# Patient Record
Sex: FEMALE
[De-identification: names, ages, dates, MRNs, and addresses within clinical notes are randomized per-mention and may not be internally consistent; named-entity substitution may affect disease eponyms.]

---

## 2017-03-02 RX ORDER — CHLORHEXIDINE GLUCONATE 213 G/1000ML
1 SOLUTION TOPICAL ONCE
Qty: 0 | Refills: 0 | Status: DISCONTINUED | OUTPATIENT
Start: 2017-03-06 | End: 2017-03-21

## 2017-03-03 ENCOUNTER — APPOINTMENT (OUTPATIENT)
Dept: HEART AND VASCULAR | Facility: CLINIC | Age: 32
End: 2017-03-03

## 2017-03-03 VITALS — SYSTOLIC BLOOD PRESSURE: 141 MMHG | OXYGEN SATURATION: 99 % | DIASTOLIC BLOOD PRESSURE: 90 MMHG | HEART RATE: 93 BPM

## 2017-03-06 ENCOUNTER — OUTPATIENT (OUTPATIENT)
Dept: OUTPATIENT SERVICES | Facility: HOSPITAL | Age: 32
LOS: 1 days | End: 2017-03-06
Payer: MEDICAID

## 2017-03-06 DIAGNOSIS — T82.897A OTHER SPECIFIED COMPLICATION OF CARDIAC PROSTHETIC DEVICES, IMPLANTS AND GRAFTS, INITIAL ENCOUNTER: Chronic | ICD-10-CM

## 2017-03-06 LAB — HCG SERPL-ACNC: <1 MIU/ML — SIGNIFICANT CHANGE UP

## 2017-03-06 PROCEDURE — C1769: CPT

## 2017-03-06 PROCEDURE — 36224 PLACE CATH CAROTD ART: CPT | Mod: 50

## 2017-03-06 PROCEDURE — 36223 PLACE CATH CAROTID/INOM ART: CPT | Mod: 59,LT

## 2017-03-06 PROCEDURE — C1894: CPT

## 2017-03-06 PROCEDURE — C1887: CPT

## 2017-03-06 PROCEDURE — 36226 PLACE CATH VERTEBRAL ART: CPT | Mod: LT

## 2017-03-06 PROCEDURE — 36225 PLACE CATH SUBCLAVIAN ART: CPT

## 2017-03-06 PROCEDURE — 84702 CHORIONIC GONADOTROPIN TEST: CPT

## 2017-03-06 PROCEDURE — C1760: CPT

## 2017-03-06 PROCEDURE — 36228 PLACE CATH INTRACRANIAL ART: CPT

## 2017-03-06 PROCEDURE — 36227 PLACE CATH XTRNL CAROTID: CPT | Mod: RT

## 2017-03-06 PROCEDURE — 36224 PLACE CATH CAROTD ART: CPT | Mod: RT

## 2017-03-06 RX ORDER — ACETAMINOPHEN 500 MG
650 TABLET ORAL ONCE
Qty: 0 | Refills: 0 | Status: DISCONTINUED | OUTPATIENT
Start: 2017-03-06 | End: 2017-03-21

## 2017-03-06 RX ORDER — SODIUM CHLORIDE 9 MG/ML
1000 INJECTION INTRAMUSCULAR; INTRAVENOUS; SUBCUTANEOUS
Qty: 0 | Refills: 0 | Status: DISCONTINUED | OUTPATIENT
Start: 2017-03-06 | End: 2017-03-21

## 2017-03-06 NOTE — BRIEF OPERATIVE NOTE - OPERATION/FINDINGS
Cerebral angiogram was performed under MAC sedation, using a 5 fr sheath right CFA.  Findings c/w Moyamoya disease. Collateral flow from B/L PCA to MCA and DAVID . Left ICA occluded at origin with some distal reconstitution via anterior ethmoidal, artery of foramen rotundum and MMA.  Dural to Pial collateral via left occipital (trans- bony) and left MMA.  Severe stenosis of Right ICA at supraclinoid level enlarged artery of [place] supplying DAVID and PCA territory.  Full report to gayathri  Patient tolerated procedure well, no new neurological deficit, VSS  Right groin/vasc access site: Perclose.  Hemostasis obtained, no hematoma.  Patient was transferred to recovery room and will go home later today.  Above d/w Dr. Morris

## 2017-03-08 ENCOUNTER — OUTPATIENT (OUTPATIENT)
Dept: OUTPATIENT SERVICES | Facility: HOSPITAL | Age: 32
LOS: 1 days | End: 2017-03-08
Payer: MEDICAID

## 2017-03-08 DIAGNOSIS — T82.897A OTHER SPECIFIED COMPLICATION OF CARDIAC PROSTHETIC DEVICES, IMPLANTS AND GRAFTS, INITIAL ENCOUNTER: Chronic | ICD-10-CM

## 2017-03-08 PROCEDURE — 78607: CPT | Mod: 26

## 2017-03-08 RX ORDER — ACETAZOLAMIDE 250 MG/1
1000 TABLET ORAL ONCE
Qty: 0 | Refills: 0 | Status: DISCONTINUED | OUTPATIENT
Start: 2017-03-08 | End: 2017-03-23

## 2017-03-09 PROCEDURE — 78803 RP LOCLZJ TUM SPECT 1 AREA: CPT

## 2017-03-09 PROCEDURE — A9557: CPT

## 2017-03-09 PROCEDURE — 78607: CPT | Mod: 26

## 2017-03-13 DIAGNOSIS — I67.5 MOYAMOYA DISEASE: ICD-10-CM

## 2017-03-15 ENCOUNTER — CHART COPY (OUTPATIENT)
Age: 32
End: 2017-03-15

## 2017-03-22 ENCOUNTER — OUTPATIENT (OUTPATIENT)
Dept: OUTPATIENT SERVICES | Facility: HOSPITAL | Age: 32
LOS: 1 days | End: 2017-03-22
Payer: MEDICAID

## 2017-03-22 DIAGNOSIS — T82.897A OTHER SPECIFIED COMPLICATION OF CARDIAC PROSTHETIC DEVICES, IMPLANTS AND GRAFTS, INITIAL ENCOUNTER: Chronic | ICD-10-CM

## 2017-03-22 DIAGNOSIS — I67.5 MOYAMOYA DISEASE: ICD-10-CM

## 2017-03-22 PROCEDURE — A9557: CPT

## 2017-03-22 PROCEDURE — 70544 MR ANGIOGRAPHY HEAD W/O DYE: CPT | Mod: 26

## 2017-03-22 PROCEDURE — 70544 MR ANGIOGRAPHY HEAD W/O DYE: CPT

## 2017-03-23 ENCOUNTER — APPOINTMENT (OUTPATIENT)
Dept: HEMATOLOGY ONCOLOGY | Facility: CLINIC | Age: 32
End: 2017-03-23

## 2017-03-23 VITALS
TEMPERATURE: 98.3 F | WEIGHT: 149 LBS | HEART RATE: 91 BPM | BODY MASS INDEX: 22.58 KG/M2 | OXYGEN SATURATION: 98 % | HEIGHT: 68 IN | RESPIRATION RATE: 14 BRPM | DIASTOLIC BLOOD PRESSURE: 92 MMHG | SYSTOLIC BLOOD PRESSURE: 130 MMHG

## 2017-03-24 ENCOUNTER — RX RENEWAL (OUTPATIENT)
Age: 32
End: 2017-03-24

## 2017-03-24 LAB
25(OH)D3 SERPL-MCNC: 27.4 NG/ML
AT III PPP CHRO-ACNC: 118 %
BASOPHILS # BLD AUTO: 0.03 K/UL
BASOPHILS NFR BLD AUTO: 0.6 %
EOSINOPHIL # BLD AUTO: 0.01 K/UL
EOSINOPHIL NFR BLD AUTO: 0.2 %
ERYTHROCYTE [SEDIMENTATION RATE] IN BLOOD BY WESTERGREN METHOD: 12 MM/HR
FACT VIII ACT/NOR PPP: 121 %
FERRITIN SERPL-MCNC: 13.1 NG/ML
HAPTOGLOB SERPL-MCNC: 158 MG/DL
HCT VFR BLD CALC: 34.2 %
HCYS SERPL-MCNC: 6 UMOL/L
HGB BLD-MCNC: 10.7 G/DL
IMM GRANULOCYTES NFR BLD AUTO: 0.2 %
IRON SATN MFR SERPL: 7 %
IRON SERPL-MCNC: 26 UG/DL
LDH SERPL-CCNC: 215 U/L
LYMPHOCYTES # BLD AUTO: 1.31 K/UL
LYMPHOCYTES NFR BLD AUTO: 28.3 %
MAN DIFF?: NORMAL
MCHC RBC-ENTMCNC: 25 PG
MCHC RBC-ENTMCNC: 31.3 GM/DL
MCV RBC AUTO: 79.9 FL
MONOCYTES # BLD AUTO: 0.38 K/UL
MONOCYTES NFR BLD AUTO: 8.2 %
NEUTROPHILS # BLD AUTO: 2.89 K/UL
NEUTROPHILS NFR BLD AUTO: 62.5 %
PLATELET # BLD AUTO: 304 K/UL
RBC # BLD: 4.28 M/UL
RBC # FLD: 16.7 %
TIBC SERPL-MCNC: 365 UG/DL
UIBC SERPL-MCNC: 339 UG/DL
VIT B12 SERPL-MCNC: 447 PG/ML
WBC # FLD AUTO: 4.63 K/UL

## 2017-03-27 LAB
ANA SER IF-ACNC: NEGATIVE
DNA PLOIDY SPEC FC-IMP: NORMAL

## 2017-03-30 LAB — PTR INTERP: NORMAL

## 2017-05-11 ENCOUNTER — APPOINTMENT (OUTPATIENT)
Dept: NEUROSURGERY | Facility: CLINIC | Age: 32
End: 2017-05-11

## 2017-05-11 VITALS
HEART RATE: 89 BPM | WEIGHT: 149 LBS | DIASTOLIC BLOOD PRESSURE: 95 MMHG | OXYGEN SATURATION: 98 % | BODY MASS INDEX: 22.58 KG/M2 | HEIGHT: 68 IN | SYSTOLIC BLOOD PRESSURE: 144 MMHG

## 2017-05-18 ENCOUNTER — FORM ENCOUNTER (OUTPATIENT)
Age: 32
End: 2017-05-18

## 2017-05-19 ENCOUNTER — OUTPATIENT (OUTPATIENT)
Dept: OUTPATIENT SERVICES | Facility: HOSPITAL | Age: 32
LOS: 1 days | End: 2017-05-19
Payer: MEDICAID

## 2017-05-19 ENCOUNTER — APPOINTMENT (OUTPATIENT)
Dept: HEART AND VASCULAR | Facility: CLINIC | Age: 32
End: 2017-05-19

## 2017-05-19 VITALS — OXYGEN SATURATION: 97 % | HEART RATE: 90 BPM | DIASTOLIC BLOOD PRESSURE: 107 MMHG | SYSTOLIC BLOOD PRESSURE: 141 MMHG

## 2017-05-19 DIAGNOSIS — T82.897A OTHER SPECIFIED COMPLICATION OF CARDIAC PROSTHETIC DEVICES, IMPLANTS AND GRAFTS, INITIAL ENCOUNTER: Chronic | ICD-10-CM

## 2017-05-19 DIAGNOSIS — I25.10 ATHEROSCLEROTIC HEART DISEASE OF NATIVE CORONARY ARTERY WITHOUT ANGINA PECTORIS: ICD-10-CM

## 2017-05-19 PROCEDURE — 93306 TTE W/DOPPLER COMPLETE: CPT

## 2017-05-19 PROCEDURE — 93306 TTE W/DOPPLER COMPLETE: CPT | Mod: 26

## 2017-05-30 VITALS
DIASTOLIC BLOOD PRESSURE: 72 MMHG | TEMPERATURE: 99 F | WEIGHT: 149.91 LBS | RESPIRATION RATE: 16 BRPM | HEART RATE: 82 BPM | SYSTOLIC BLOOD PRESSURE: 145 MMHG | HEIGHT: 68 IN | OXYGEN SATURATION: 100 %

## 2017-05-30 NOTE — PATIENT PROFILE ADULT. - PMH
CAD (coronary artery disease)  Hx Cardiac stent which was re occluded then open  Epilepsy  Epilepsy  Essential hypertension  Hypertension CAD (coronary artery disease)  Hx Cardiac stent which was re occluded then open  Dyslipidemia    Epilepsy  Epilepsy  Essential hypertension  Hypertension  Moyamoya disease CAD (coronary artery disease)  Hx Cardiac stent which was re occluded then open  Dyslipidemia    Epilepsy  Epilepsy  Essential hypertension  Hypertension  Moyamoya disease    TIA (transient ischemic attack)  x 4

## 2017-05-31 ENCOUNTER — INPATIENT (INPATIENT)
Facility: HOSPITAL | Age: 32
LOS: 7 days | Discharge: HOME | DRG: 25 | End: 2017-06-08
Attending: NEUROLOGICAL SURGERY | Admitting: NEUROLOGICAL SURGERY
Payer: MEDICAID

## 2017-05-31 DIAGNOSIS — T82.897A OTHER SPECIFIED COMPLICATION OF CARDIAC PROSTHETIC DEVICES, IMPLANTS AND GRAFTS, INITIAL ENCOUNTER: Chronic | ICD-10-CM

## 2017-05-31 LAB
ALBUMIN SERPL ELPH-MCNC: 3.7 G/DL — SIGNIFICANT CHANGE UP (ref 3.3–5)
ALP SERPL-CCNC: 56 U/L — SIGNIFICANT CHANGE UP (ref 40–120)
ALT FLD-CCNC: 11 U/L — SIGNIFICANT CHANGE UP (ref 10–45)
ANION GAP SERPL CALC-SCNC: 10 MMOL/L — SIGNIFICANT CHANGE UP (ref 5–17)
APTT BLD: 53.6 SEC — HIGH (ref 27.5–37.4)
APTT BLD: >200 SEC — CRITICAL HIGH (ref 27.5–37.4)
APTT BLD: >200 SEC — CRITICAL HIGH (ref 27.5–37.4)
AST SERPL-CCNC: 20 U/L — SIGNIFICANT CHANGE UP (ref 10–40)
BILIRUB SERPL-MCNC: <0.2 MG/DL — SIGNIFICANT CHANGE UP (ref 0.2–1.2)
BUN SERPL-MCNC: 7 MG/DL — SIGNIFICANT CHANGE UP (ref 7–23)
CALCIUM SERPL-MCNC: 8.5 MG/DL — SIGNIFICANT CHANGE UP (ref 8.4–10.5)
CHLORIDE SERPL-SCNC: 108 MMOL/L — SIGNIFICANT CHANGE UP (ref 96–108)
CO2 SERPL-SCNC: 23 MMOL/L — SIGNIFICANT CHANGE UP (ref 22–31)
CREAT SERPL-MCNC: 0.8 MG/DL — SIGNIFICANT CHANGE UP (ref 0.5–1.3)
GLUCOSE SERPL-MCNC: 143 MG/DL — HIGH (ref 70–99)
HCT VFR BLD CALC: 34.1 % — LOW (ref 34.5–45)
HGB BLD-MCNC: 11.1 G/DL — LOW (ref 11.5–15.5)
INR BLD: 1.12 — SIGNIFICANT CHANGE UP (ref 0.88–1.16)
INR BLD: 1.13 — SIGNIFICANT CHANGE UP (ref 0.88–1.16)
MAGNESIUM SERPL-MCNC: 1.8 MG/DL — SIGNIFICANT CHANGE UP (ref 1.6–2.6)
MCHC RBC-ENTMCNC: 27.6 PG — SIGNIFICANT CHANGE UP (ref 27–34)
MCHC RBC-ENTMCNC: 32.6 G/DL — SIGNIFICANT CHANGE UP (ref 32–36)
MCV RBC AUTO: 84.8 FL — SIGNIFICANT CHANGE UP (ref 80–100)
PHOSPHATE SERPL-MCNC: 2.6 MG/DL — SIGNIFICANT CHANGE UP (ref 2.5–4.5)
PLATELET # BLD AUTO: 185 K/UL — SIGNIFICANT CHANGE UP (ref 150–400)
PLATELET RESPONSE ASPIRIN RESULT: 590 ARU — SIGNIFICANT CHANGE UP (ref 350–700)
POTASSIUM SERPL-MCNC: 3.6 MMOL/L — SIGNIFICANT CHANGE UP (ref 3.5–5.3)
POTASSIUM SERPL-SCNC: 3.6 MMOL/L — SIGNIFICANT CHANGE UP (ref 3.5–5.3)
PROT SERPL-MCNC: 6.5 G/DL — SIGNIFICANT CHANGE UP (ref 6–8.3)
PROTHROM AB SERPL-ACNC: 12.5 SEC — SIGNIFICANT CHANGE UP (ref 9.8–12.7)
PROTHROM AB SERPL-ACNC: 12.6 SEC — SIGNIFICANT CHANGE UP (ref 9.8–12.7)
RBC # BLD: 4.02 M/UL — SIGNIFICANT CHANGE UP (ref 3.8–5.2)
RBC # FLD: 16.5 % — SIGNIFICANT CHANGE UP (ref 10.3–16.9)
SODIUM SERPL-SCNC: 141 MMOL/L — SIGNIFICANT CHANGE UP (ref 135–145)
WBC # BLD: 8.4 K/UL — SIGNIFICANT CHANGE UP (ref 3.8–10.5)
WBC # FLD AUTO: 8.4 K/UL — SIGNIFICANT CHANGE UP (ref 3.8–10.5)

## 2017-05-31 PROCEDURE — 71010: CPT | Mod: 26

## 2017-05-31 PROCEDURE — 70450 CT HEAD/BRAIN W/O DYE: CPT | Mod: 26

## 2017-05-31 PROCEDURE — 69990 MICROSURGERY ADD-ON: CPT

## 2017-05-31 PROCEDURE — 64999 UNLISTED PX NERVOUS SYSTEM: CPT

## 2017-05-31 PROCEDURE — 93010 ELECTROCARDIOGRAM REPORT: CPT

## 2017-05-31 PROCEDURE — 92240 ICG ANGIOGRAPHY I&R UNI/BI: CPT | Mod: 26

## 2017-05-31 PROCEDURE — 61711 FUSION OF SKULL ARTERIES: CPT

## 2017-05-31 PROCEDURE — 99291 CRITICAL CARE FIRST HOUR: CPT | Mod: 24

## 2017-05-31 PROCEDURE — 93888 INTRACRANIAL LIMITED STUDY: CPT | Mod: 26

## 2017-05-31 RX ORDER — HEPARIN SODIUM 5000 [USP'U]/ML
600 INJECTION INTRAVENOUS; SUBCUTANEOUS
Qty: 25000 | Refills: 0 | Status: DISCONTINUED | OUTPATIENT
Start: 2017-05-31 | End: 2017-06-01

## 2017-05-31 RX ORDER — ATORVASTATIN CALCIUM 80 MG/1
80 TABLET, FILM COATED ORAL AT BEDTIME
Qty: 0 | Refills: 0 | Status: DISCONTINUED | OUTPATIENT
Start: 2017-05-31 | End: 2017-06-08

## 2017-05-31 RX ORDER — SODIUM CHLORIDE 9 MG/ML
500 INJECTION INTRAMUSCULAR; INTRAVENOUS; SUBCUTANEOUS ONCE
Qty: 0 | Refills: 0 | Status: COMPLETED | OUTPATIENT
Start: 2017-05-31 | End: 2017-06-01

## 2017-05-31 RX ORDER — RANOLAZINE 500 MG/1
1000 TABLET, FILM COATED, EXTENDED RELEASE ORAL
Qty: 0 | Refills: 0 | Status: DISCONTINUED | OUTPATIENT
Start: 2017-05-31 | End: 2017-06-08

## 2017-05-31 RX ORDER — POTASSIUM PHOSPHATE, MONOBASIC POTASSIUM PHOSPHATE, DIBASIC 236; 224 MG/ML; MG/ML
15 INJECTION, SOLUTION INTRAVENOUS ONCE
Qty: 0 | Refills: 0 | Status: COMPLETED | OUTPATIENT
Start: 2017-05-31 | End: 2017-05-31

## 2017-05-31 RX ORDER — MINOCYCLINE HYDROCHLORIDE 45 MG/1
100 TABLET, EXTENDED RELEASE ORAL
Qty: 0 | Refills: 0 | Status: DISCONTINUED | OUTPATIENT
Start: 2017-05-31 | End: 2017-06-05

## 2017-05-31 RX ORDER — RANOLAZINE 500 MG/1
1 TABLET, FILM COATED, EXTENDED RELEASE ORAL
Qty: 0 | Refills: 0 | COMMUNITY

## 2017-05-31 RX ORDER — ACETAMINOPHEN 500 MG
650 TABLET ORAL EVERY 6 HOURS
Qty: 0 | Refills: 0 | Status: DISCONTINUED | OUTPATIENT
Start: 2017-05-31 | End: 2017-06-08

## 2017-05-31 RX ORDER — ASPIRIN/CALCIUM CARB/MAGNESIUM 324 MG
325 TABLET ORAL DAILY
Qty: 0 | Refills: 0 | Status: DISCONTINUED | OUTPATIENT
Start: 2017-05-31 | End: 2017-06-01

## 2017-05-31 RX ORDER — SODIUM CHLORIDE 9 MG/ML
1000 INJECTION INTRAMUSCULAR; INTRAVENOUS; SUBCUTANEOUS
Qty: 0 | Refills: 0 | Status: DISCONTINUED | OUTPATIENT
Start: 2017-05-31 | End: 2017-06-02

## 2017-05-31 RX ORDER — HEPARIN SODIUM 5000 [USP'U]/ML
INJECTION INTRAVENOUS; SUBCUTANEOUS
Qty: 25000 | Refills: 0 | Status: DISCONTINUED | OUTPATIENT
Start: 2017-05-31 | End: 2017-05-31

## 2017-05-31 RX ORDER — SENNA PLUS 8.6 MG/1
2 TABLET ORAL AT BEDTIME
Qty: 0 | Refills: 0 | Status: DISCONTINUED | OUTPATIENT
Start: 2017-05-31 | End: 2017-06-08

## 2017-05-31 RX ORDER — HEPARIN SODIUM 5000 [USP'U]/ML
600 INJECTION INTRAVENOUS; SUBCUTANEOUS
Qty: 25000 | Refills: 0 | Status: DISCONTINUED | OUTPATIENT
Start: 2017-05-31 | End: 2017-05-31

## 2017-05-31 RX ORDER — ACETAMINOPHEN 500 MG
325 TABLET ORAL EVERY 4 HOURS
Qty: 0 | Refills: 0 | Status: DISCONTINUED | OUTPATIENT
Start: 2017-05-31 | End: 2017-06-08

## 2017-05-31 RX ORDER — ONDANSETRON 8 MG/1
4 TABLET, FILM COATED ORAL EVERY 6 HOURS
Qty: 0 | Refills: 0 | Status: DISCONTINUED | OUTPATIENT
Start: 2017-05-31 | End: 2017-06-08

## 2017-05-31 RX ORDER — OXCARBAZEPINE 300 MG/1
600 TABLET, FILM COATED ORAL
Qty: 0 | Refills: 0 | Status: DISCONTINUED | OUTPATIENT
Start: 2017-05-31 | End: 2017-06-08

## 2017-05-31 RX ORDER — INSULIN LISPRO 100/ML
VIAL (ML) SUBCUTANEOUS
Qty: 0 | Refills: 0 | Status: DISCONTINUED | OUTPATIENT
Start: 2017-05-31 | End: 2017-06-08

## 2017-05-31 RX ORDER — DOCUSATE SODIUM 100 MG
100 CAPSULE ORAL THREE TIMES A DAY
Qty: 0 | Refills: 0 | Status: DISCONTINUED | OUTPATIENT
Start: 2017-05-31 | End: 2017-06-08

## 2017-05-31 RX ORDER — MAGNESIUM SULFATE 500 MG/ML
1 VIAL (ML) INJECTION ONCE
Qty: 0 | Refills: 0 | Status: COMPLETED | OUTPATIENT
Start: 2017-05-31 | End: 2017-05-31

## 2017-05-31 RX ORDER — CEFAZOLIN SODIUM 1 G
1000 VIAL (EA) INJECTION EVERY 8 HOURS
Qty: 0 | Refills: 0 | Status: COMPLETED | OUTPATIENT
Start: 2017-05-31 | End: 2017-06-01

## 2017-05-31 RX ORDER — LEVETIRACETAM 250 MG/1
500 TABLET, FILM COATED ORAL EVERY 12 HOURS
Qty: 0 | Refills: 0 | Status: DISCONTINUED | OUTPATIENT
Start: 2017-05-31 | End: 2017-05-31

## 2017-05-31 RX ORDER — ASPIRIN/CALCIUM CARB/MAGNESIUM 324 MG
300 TABLET ORAL ONCE
Qty: 0 | Refills: 0 | Status: COMPLETED | OUTPATIENT
Start: 2017-05-31 | End: 2017-05-31

## 2017-05-31 RX ADMIN — OXCARBAZEPINE 600 MILLIGRAM(S): 300 TABLET, FILM COATED ORAL at 18:46

## 2017-05-31 RX ADMIN — Medication 650 MILLIGRAM(S): at 21:37

## 2017-05-31 RX ADMIN — Medication 100 MILLIGRAM(S): at 22:08

## 2017-05-31 RX ADMIN — MINOCYCLINE HYDROCHLORIDE 100 MILLIGRAM(S): 45 TABLET, EXTENDED RELEASE ORAL at 18:46

## 2017-05-31 RX ADMIN — POTASSIUM PHOSPHATE, MONOBASIC POTASSIUM PHOSPHATE, DIBASIC 62.5 MILLIMOLE(S): 236; 224 INJECTION, SOLUTION INTRAVENOUS at 22:19

## 2017-05-31 RX ADMIN — Medication 100 GRAM(S): at 18:45

## 2017-05-31 RX ADMIN — Medication 100 MILLIGRAM(S): at 22:07

## 2017-05-31 RX ADMIN — Medication 300 MILLIGRAM(S): at 16:22

## 2017-05-31 RX ADMIN — ATORVASTATIN CALCIUM 80 MILLIGRAM(S): 80 TABLET, FILM COATED ORAL at 22:08

## 2017-05-31 RX ADMIN — Medication 650 MILLIGRAM(S): at 22:37

## 2017-05-31 RX ADMIN — RANOLAZINE 1000 MILLIGRAM(S): 500 TABLET, FILM COATED, EXTENDED RELEASE ORAL at 18:45

## 2017-05-31 NOTE — PROGRESS NOTE ADULT - SUBJECTIVE AND OBJECTIVE BOX
=================================  NEUROCRITICAL CARE ATTENDING NOTE  =================================    STEPHANE NICOLE   MRN-[id number]  Summary:  31F with Hypertension, dyslipidemia, CAD, s/p stents x3, presented with intermittent speech difficulties, HA, seizures, diagnosed with TIAs.  Imaging revealed bilatearl ICA occlusion, Moyamoya pattern.  SPECT / NOVA showed asymmetric flow favoring R, with worsening perfusion on acetazolamide.  Admitted  for elective L direct and indirect bypass; bypass clotted in the OR - given extra doses of ASA, started on heparin drip  Overnight Events: admitted to NSICU this afternoon    PAST MEDICAL & SURGICAL HISTORY: TIA (transient ischemic attack): x 4 Moyamoya disease Dyslipidemia CAD (coronary artery disease): Hx Cardiac stent which was re occluded then open Essential hypertension: Hypertension Epilepsy: Epilepsy Coronary stent occlusion Other postprocedural status: S/P   NKDA  Home meds: NTG PRN, ISMN 30mg daily ticagrelor 90mg BID atorvastatin 80mg HS  daily ranexa 1g ER BID cardizem CD daily oxcarbazepine 600mg BID    PHYSICAL EXAMINATION  VS reviewed  NEUROLOGIC EXAMINATION:  Patient is lethargic but easily rousable, fully oriented, follows commands, pupils 3mm equal and briskly reactive to light, EOMs intact, moves all 4s  GENERAL:  not intubated, not in cardiorespiratory distress  EENT: anicteric  CARDIOVASC:  (+) S1 S2, normal rate and regular rhythm  PULMONARY:  clear to auscultation bilaterally  ABDOMEN:  soft, nontender, with normoactive bowel sounds  EXTREMITIES:  no edema  SKIN:  no rash    LABS:             11.1   8.4   )-----------( 185      ( 31 May 2017 16:18 )             34.1     Bacteriology:  CSF studies:  EEG:  Neuroimaging: no recent imaging  Other imaging:    MEDICATIONS: asa 325 daily ancef q8h docusate mod ISS minocycline 100 BID atorvastatin 80mg HS oxcarbazepine 600mg BID ranolazine 1g BID bisacodyl senna    IV FLUIDS: NS  DRIPS: heprarin gtt  DIET: NPO  Lines / Drains: Lexie Rosa SG JP    CODE STATUS:  full code                       GOALS OF CARE:  aggressive                      DISPOSITION:  ICU

## 2017-05-31 NOTE — H&P PST ADULT - HISTORY OF PRESENT ILLNESS
32 yo woman with extensive medical history including HTN, dyslipidemia, CAD vs coronary vasospasm S/P PTCA x 3, who recently presented with L hemispheric TIAs (intermittent speech difficulties) as well as H/A and seizures. Vascular imaging (MRA, DSA) revealed bilateral ICA occlusion with multiple sources of collateral flow to bilateral hemispheres (mostly PCA on R side, PCA and transdural MMA collaterals on L side), consistent with severe bilateral moyamoya. Perfusion imaging (SPECT, NOVA) showed asymmetric flow favoring R side, which is worse after acetazolamide challenge. Bilateral cerebral revascularization was thus indicated, starting with the more symptomatic L side. Patient is being admitted electively for L direct-indirect EC-IC bypass.

## 2017-05-31 NOTE — PROGRESS NOTE ADULT - ATTENDING COMMENTS
PLAN:   NEURO: neurochecks q1h, PRN pain meds with Tylenol, no opiates / benzos while lethargic  s/p bypass:  continue ASA daily, heparin gtt; CT this evening; minocycline x at least 4 days  epilepsy: continue oxcarbazepine 600mg BID, d/c levetiracetam   SG TASHI - keep for now, monitor output  REHAB:  no physical therapy for now    EARLY MOB:  HOB 20 degrees; bedrest for now until cleared by neurosurgery    PULM:  Room air, incentive spirometry  CARDIO:  SBP goal 140-180 mm Hg; continue ranexa, hold ISMN and cardizem for now; may need BP augmentation if SBP drops below 140; continue high dose statin; ticagrelor on hold for now; consult cardio  ENDO:  Blood sugar goals 140-180 mg/dL, continue insulin sliding scale  GI:  docusate senna  DIET: bedside swallow once more awake  RENAL:  NS@100cc/hr  HEM/ONC: Hb stable; PTT goal 40-53 - heparin gtt per neuro protocol  VTE Prophylaxis: SCDs, on heparin drip  ID: afebrile, no leukocytosis  Social: family at bedside, updated    Patient at high risk for neurological deterioration or death due to:  stroke, intracranial bleed, delirium, hyperperfusion injury, seizures.  Critical care time, excluding procedures: 60 minutes.

## 2017-05-31 NOTE — H&P PST ADULT - ASSESSMENT
32 yo woman with severe symptomatic bilateral moyamoya, worse on L. Plan for combined direct-indirect EC-IC bypass today in OR.

## 2017-05-31 NOTE — PROGRESS NOTE ADULT - ASSESSMENT
31F with Sandra sandra disease s/p combined direct /indirect L EC-IC bypass (05/31/17, Dr. Long); dyslipidemia, CAD, Hypertension, h/o epilepsy, s/p coronary stents

## 2017-05-31 NOTE — PROGRESS NOTE ADULT - SUBJECTIVE AND OBJECTIVE BOX
POC    Pt seen at bedside. Denies HA.     Vital Signs Last 24 Hrs  T(C): --  T(F): --  HR: --  BP: --  BP(mean): --  RR: --  SpO2: --    I&O's Detail    I&O's Summary      PHYSICAL EXAM:  Neurological:  Lethargic but arousable  AxOx3 in [place]  PERRL  speech coherent  FC  No pronator drift  PRATER w/o focal deficit    Incision/Wound:C/D/I    TUBES/LINES:  [] CVC  [x] A-line  [] Lumbar Drain  [] Ventriculostomy  [x] Other-TASHI subgaleal    DIET:  [x] NPO  [] Mechanical  [] Tube feeds    LABS:                        11.1   8.4   )-----------( 185      ( 31 May 2017 16:18 )             34.1           PT/INR - ( 31 May 2017 15:12 )   PT: 12.6 sec;   INR: 1.13          PTT - ( 31 May 2017 15:12 )  PTT:>200.0 sec        CAPILLARY BLOOD GLUCOSE      Drug Levels: [] N/A    CSF Analysis: [] N/A      Allergies    No Known Allergies    Intolerances      MEDICATIONS:  Antibiotics:  ceFAZolin   IVPB 1000milliGRAM(s) IV Intermittent every 8 hours  minocycline 100milliGRAM(s) Oral two times a day    Neuro:  aspirin 325milliGRAM(s) Oral daily  acetaminophen   Tablet. 325milliGRAM(s) Oral every 4 hours PRN  acetaminophen   Tablet. 650milliGRAM(s) Oral every 6 hours PRN  ondansetron Injectable 4milliGRAM(s) IV Push every 6 hours PRN  OXcarbazepine 600milliGRAM(s) Oral two times a day    Anticoagulation:  heparin  Infusion. Unit(s)/Hr IV Continuous <Continuous>    OTHER:  docusate sodium 100milliGRAM(s) Oral three times a day  bisacodyl 5milliGRAM(s) Oral daily PRN  senna 2Tablet(s) Oral at bedtime PRN  insulin lispro (HumaLOG) corrective regimen sliding scale  SubCutaneous Before meals and at bedtime  atorvastatin 80milliGRAM(s) Oral at bedtime  ranolazine 1000milliGRAM(s) Oral two times a day    IVF:  sodium chloride 0.9%. 1000milliLiter(s) IV Continuous <Continuous>    CULTURES:    RADIOLOGY & ADDITIONAL TESTS:      ASSESSMENT:  31y Female s/p L STA-MCA direct and indirect bypass. POD#0    PLAN:  -bedrest x 24hr  -heparin drip and aspirin  -CTH tonight  -Strict BP parameters 140-180  -minocycline  -f/u TASHI output  -neurochecks q1hr  -D/W     DVT PROPHYLAXIS:  [] Venodynes                                [] Heparin/Lovenox    FALL RISK:  [] Low Risk                                    [] Impulsive

## 2017-05-31 NOTE — H&P PST ADULT - PMH
CAD (coronary artery disease)  Hx Cardiac stent which was re occluded then open  Dyslipidemia    Epilepsy  Epilepsy  Essential hypertension  Hypertension  Moyamoya disease    TIA (transient ischemic attack)  x 4

## 2017-06-01 LAB
ANION GAP SERPL CALC-SCNC: 10 MMOL/L — SIGNIFICANT CHANGE UP (ref 5–17)
APTT BLD: 35.2 SEC — SIGNIFICANT CHANGE UP (ref 27.5–37.4)
APTT BLD: 40.1 SEC — HIGH (ref 27.5–37.4)
APTT BLD: 42.2 SEC — HIGH (ref 27.5–37.4)
BUN SERPL-MCNC: 8 MG/DL — SIGNIFICANT CHANGE UP (ref 7–23)
CALCIUM SERPL-MCNC: 7.9 MG/DL — LOW (ref 8.4–10.5)
CHLORIDE SERPL-SCNC: 109 MMOL/L — HIGH (ref 96–108)
CK MB CFR SERPL CALC: 13.4 NG/ML — HIGH (ref 0–6.7)
CK MB CFR SERPL CALC: 16.5 NG/ML — HIGH (ref 0–6.7)
CK SERPL-CCNC: 1160 U/L — HIGH (ref 25–170)
CK SERPL-CCNC: 1385 U/L — HIGH (ref 25–170)
CO2 SERPL-SCNC: 20 MMOL/L — LOW (ref 22–31)
CREAT SERPL-MCNC: 0.6 MG/DL — SIGNIFICANT CHANGE UP (ref 0.5–1.3)
GLUCOSE SERPL-MCNC: 145 MG/DL — HIGH (ref 70–99)
HBA1C BLD-MCNC: 4.9 % — SIGNIFICANT CHANGE UP (ref 4–5.6)
HCT VFR BLD CALC: 31.7 % — LOW (ref 34.5–45)
HGB BLD-MCNC: 10.3 G/DL — LOW (ref 11.5–15.5)
MAGNESIUM SERPL-MCNC: 2.1 MG/DL — SIGNIFICANT CHANGE UP (ref 1.6–2.6)
MCHC RBC-ENTMCNC: 27.5 PG — SIGNIFICANT CHANGE UP (ref 27–34)
MCHC RBC-ENTMCNC: 32.5 G/DL — SIGNIFICANT CHANGE UP (ref 32–36)
MCV RBC AUTO: 84.8 FL — SIGNIFICANT CHANGE UP (ref 80–100)
PA ADP PRP-ACNC: 342 PRU — SIGNIFICANT CHANGE UP (ref 194–418)
PHOSPHATE SERPL-MCNC: 2.4 MG/DL — LOW (ref 2.5–4.5)
PLATELET # BLD AUTO: 168 K/UL — SIGNIFICANT CHANGE UP (ref 150–400)
PLATELET RESPONSE ASPIRIN RESULT: 587 ARU — SIGNIFICANT CHANGE UP (ref 350–700)
POTASSIUM SERPL-MCNC: 4.2 MMOL/L — SIGNIFICANT CHANGE UP (ref 3.5–5.3)
POTASSIUM SERPL-SCNC: 4.2 MMOL/L — SIGNIFICANT CHANGE UP (ref 3.5–5.3)
RBC # BLD: 3.74 M/UL — LOW (ref 3.8–5.2)
RBC # FLD: 17.1 % — HIGH (ref 10.3–16.9)
SODIUM SERPL-SCNC: 139 MMOL/L — SIGNIFICANT CHANGE UP (ref 135–145)
TROPONIN T SERPL-MCNC: <0.01 NG/ML — SIGNIFICANT CHANGE UP (ref 0–0.01)
TROPONIN T SERPL-MCNC: <0.01 NG/ML — SIGNIFICANT CHANGE UP (ref 0–0.01)
WBC # BLD: 11.1 K/UL — HIGH (ref 3.8–10.5)
WBC # FLD AUTO: 11.1 K/UL — HIGH (ref 3.8–10.5)

## 2017-06-01 PROCEDURE — 36226 PLACE CATH VERTEBRAL ART: CPT | Mod: RT

## 2017-06-01 PROCEDURE — 36224 PLACE CATH CAROTD ART: CPT | Mod: 50

## 2017-06-01 PROCEDURE — 99291 CRITICAL CARE FIRST HOUR: CPT | Mod: 24

## 2017-06-01 PROCEDURE — 99233 SBSQ HOSP IP/OBS HIGH 50: CPT

## 2017-06-01 PROCEDURE — 36227 PLACE CATH XTRNL CAROTID: CPT | Mod: 50

## 2017-06-01 PROCEDURE — 71010: CPT | Mod: 26

## 2017-06-01 RX ORDER — NITROGLYCERIN 6.5 MG
0.4 CAPSULE, EXTENDED RELEASE ORAL ONCE
Qty: 0 | Refills: 0 | Status: DISCONTINUED | OUTPATIENT
Start: 2017-06-01 | End: 2017-06-01

## 2017-06-01 RX ORDER — ASPIRIN/CALCIUM CARB/MAGNESIUM 324 MG
81 TABLET ORAL DAILY
Qty: 0 | Refills: 0 | Status: DISCONTINUED | OUTPATIENT
Start: 2017-06-01 | End: 2017-06-01

## 2017-06-01 RX ORDER — NITROGLYCERIN 6.5 MG
0.4 CAPSULE, EXTENDED RELEASE ORAL ONCE
Qty: 0 | Refills: 0 | Status: COMPLETED | OUTPATIENT
Start: 2017-06-01 | End: 2017-06-01

## 2017-06-01 RX ORDER — SODIUM CHLORIDE 9 MG/ML
500 INJECTION INTRAMUSCULAR; INTRAVENOUS; SUBCUTANEOUS ONCE
Qty: 0 | Refills: 0 | Status: DISCONTINUED | OUTPATIENT
Start: 2017-06-01 | End: 2017-06-02

## 2017-06-01 RX ORDER — TICAGRELOR 90 MG/1
60 TABLET ORAL
Qty: 0 | Refills: 0 | Status: DISCONTINUED | OUTPATIENT
Start: 2017-06-01 | End: 2017-06-08

## 2017-06-01 RX ORDER — HEPARIN SODIUM 5000 [USP'U]/ML
700 INJECTION INTRAVENOUS; SUBCUTANEOUS
Qty: 25000 | Refills: 0 | Status: DISCONTINUED | OUTPATIENT
Start: 2017-06-01 | End: 2017-06-02

## 2017-06-01 RX ADMIN — Medication 100 MILLIGRAM(S): at 22:00

## 2017-06-01 RX ADMIN — Medication 100 MILLIGRAM(S): at 06:06

## 2017-06-01 RX ADMIN — Medication 0.4 MILLIGRAM(S): at 10:45

## 2017-06-01 RX ADMIN — SODIUM CHLORIDE 1000 MILLILITER(S): 9 INJECTION INTRAMUSCULAR; INTRAVENOUS; SUBCUTANEOUS at 02:35

## 2017-06-01 RX ADMIN — ONDANSETRON 4 MILLIGRAM(S): 8 TABLET, FILM COATED ORAL at 18:51

## 2017-06-01 RX ADMIN — SODIUM CHLORIDE 100 MILLILITER(S): 9 INJECTION INTRAMUSCULAR; INTRAVENOUS; SUBCUTANEOUS at 18:21

## 2017-06-01 RX ADMIN — Medication 0.4 MILLIGRAM(S): at 02:30

## 2017-06-01 RX ADMIN — RANOLAZINE 1000 MILLIGRAM(S): 500 TABLET, FILM COATED, EXTENDED RELEASE ORAL at 06:07

## 2017-06-01 RX ADMIN — HEPARIN SODIUM 7 UNIT(S)/HR: 5000 INJECTION INTRAVENOUS; SUBCUTANEOUS at 20:00

## 2017-06-01 RX ADMIN — MINOCYCLINE HYDROCHLORIDE 100 MILLIGRAM(S): 45 TABLET, EXTENDED RELEASE ORAL at 06:07

## 2017-06-01 RX ADMIN — ATORVASTATIN CALCIUM 80 MILLIGRAM(S): 80 TABLET, FILM COATED ORAL at 22:00

## 2017-06-01 RX ADMIN — OXCARBAZEPINE 600 MILLIGRAM(S): 300 TABLET, FILM COATED ORAL at 06:07

## 2017-06-01 RX ADMIN — TICAGRELOR 60 MILLIGRAM(S): 90 TABLET ORAL at 18:00

## 2017-06-01 RX ADMIN — OXCARBAZEPINE 600 MILLIGRAM(S): 300 TABLET, FILM COATED ORAL at 18:00

## 2017-06-01 RX ADMIN — RANOLAZINE 1000 MILLIGRAM(S): 500 TABLET, FILM COATED, EXTENDED RELEASE ORAL at 18:00

## 2017-06-01 RX ADMIN — MINOCYCLINE HYDROCHLORIDE 100 MILLIGRAM(S): 45 TABLET, EXTENDED RELEASE ORAL at 18:00

## 2017-06-01 NOTE — PROGRESS NOTE ADULT - SUBJECTIVE AND OBJECTIVE BOX
Pt well known to me. 30 yo F with Ana Perez dz, seizure d/o, CAD s/p PCI x 3 to pLAD (last one in 3/2016), normal LVEF, chronic angina 2/2 coronary spasms and jailed small LCx by LAD stent going into distal left main, s/p combined direct /indirect L EC-IC bypasss yesterday. Pt is hemodynamically stable with stable chronic angina/MSK pain. No palpitatuions. No SOB  	  MEDICATIONS:  ranolazine 1000milliGRAM(s) Oral two times a day  minocycline 100milliGRAM(s) Oral two times a day  aspirin 325milliGRAM(s) Oral daily  acetaminophen   Tablet. 325milliGRAM(s) Oral every 4 hours PRN  acetaminophen   Tablet. 650milliGRAM(s) Oral every 6 hours PRN  ondansetron Injectable 4milliGRAM(s) IV Push every 6 hours PRN  OXcarbazepine 600milliGRAM(s) Oral two times a day  docusate sodium 100milliGRAM(s) Oral three times a day  bisacodyl 5milliGRAM(s) Oral daily PRN  senna 2Tablet(s) Oral at bedtime PRN  insulin lispro (HumaLOG) corrective regimen sliding scale  SubCutaneous Before meals and at bedtime  atorvastatin 80milliGRAM(s) Oral at bedtime  sodium chloride 0.9%. 1000milliLiter(s) IV Continuous <Continuous>  heparin  Infusion 600Unit(s)/Hr IV Continuous <Continuous>  sodium chloride 0.9% Bolus 500milliLiter(s) IV Bolus once      PHYSICAL EXAM:  T(C): 37, Max: 37.4 (06-01 @ 02:30)  HR: 86 (70 - 98)  BP: 143/72 (120/72 - 143/72)  RR: 20 (10 - 24)  SpO2: 99% (94% - 100%)  Wt(kg): --  I&O's Summary  I & Os for 24h ending 01 Jun 2017 07:00  =============================================  IN: 2199 ml / OUT: 2410 ml / NET: -211 ml    I & Os for current day (as of 01 Jun 2017 11:43)  =============================================  IN: 418 ml / OUT: 565 ml / NET: -147 ml      Weight (kg): 72 (05-31 @ 16:00)    Appearance: Normal	  HEENT:   Normal oral mucosa, PERRL, EOMI	  Lymphatic: No lymphadenopathy  Cardiovascular: Normal S1 S2, No JVD, No murmurs   Respiratory: Lungs clear to auscultation	  Psychiatry: A & O x 3  Gastrointestinal:  Soft, Non-tender, + BS	  Skin: No rashes, No ecchymoses, No cyanosis  Neurologic: Non-focal  Extremities: No clubbing, cyanosis or edema                        10.3   11.1  )-----------( 168      ( 01 Jun 2017 06:31 )             31.7     06-01    139  |  109<H>  |  8   ----------------------------<  145<H>  4.2   |  20<L>  |  0.60    Ca    7.9<L>      01 Jun 2017 06:06  Phos  2.4     06-01  Mg     2.1     06-01    TPro  6.5  /  Alb  3.7  /  TBili  <0.2  /  DBili  x   /  AST  20  /  ALT  11  /  AlkPhos  56  05-31    ASSESSMENT/PLAN: 	  1. Restart Cardizem and Imdur ER when BP parameters allow. She can be started on a lower dose of Imdur ER 15 g qd and then titrated up to her regular dose.   2. Restart Brillinta at 60 mg q12h when deemed safe by neurosurgery (does not need to be on 90 q12h) since it has been more than 1 yr since her last PCI.   3. Care per primary team

## 2017-06-01 NOTE — PROGRESS NOTE ADULT - SUBJECTIVE AND OBJECTIVE BOX
HPI:  32 yo woman with extensive medical history including HTN, dyslipidemia, CAD vs coronary vasospasm S/P PTCA x 3, who recently presented with L hemispheric TIAs (intermittent speech difficulties) as well as H/A and seizures. Vascular imaging (MRA, DSA) revealed bilateral ICA occlusion with multiple sources of collateral flow to bilateral hemispheres (mostly PCA on R side, PCA and transdural MMA collaterals on L side), consistent with severe bilateral moyamoya. Perfusion imaging (SPECT, NOVA) showed asymmetric flow favoring R side, which is worse after acetazolamide challenge. Bilateral cerebral revascularization was thus indicated, starting with the more symptomatic L side. Patient is being admitted electively for L direct-indirect EC-IC bypass. (31 May 2017 15:12)    POD 1 s/p left STA-MCA bypass  CTH overnight obtained; stable  c/o CP, relieved with sublingual nitro  reporting left arm/hand numbness  some bleeding at pin site relieved with closure/dressing   pre op for angio today  hep drip at goal  hypotensive to 120's overnight improved without intervention    OVERNIGHT EVENTS:  Vital Signs Last 24 Hrs  T(C): 36.2, Max: 37.4 (06-01 @ 02:30)  T(F): 97.1, Max: 99.4 (06-01 @ 02:30)  HR: 82 (70 - 98)  BP: 143/72 (120/72 - 143/72)  BP(mean): 102 (90 - 102)  RR: 24 (10 - 24)  SpO2: 99% (94% - 100%)    I&O's Detail    I & Os for current day (as of 01 Jun 2017 07:02)  =============================================  IN:    sodium chloride 0.9%.: 1600 ml    Solution: 315 ml    IV PiggyBack: 200 ml    heparin Infusion: 66 ml    heparin  Infusion.: 18 ml    Total IN: 2199 ml  ---------------------------------------------  OUT:    Indwelling Catheter - Urethral: 1925 ml    Voided: 350 ml    Bulb: 135 ml    Total OUT: 2410 ml  ---------------------------------------------  Total NET: -211 ml    I&O's Summary    I & Os for current day (as of 01 Jun 2017 07:02)  =============================================  IN: 2199 ml / OUT: 2410 ml / NET: -211 ml      PHYSICAL EXAM:  Neurological:  A&O x 3, appears comfortable  EOMI, PERRL  PRATER X 4, no focal deficits   sensation intact b/l  incision site c/d/i  subgaleal TASHI in place    TUBES/LINES:  [] CVC  [x] A-line  [] Lumbar Drain  [] Ventriculostomy  [x] Other: wound drain    DIET:  [x] NPO  [] Mechanical  [] Tube feeds    LABS:                        10.3   11.1  )-----------( 168      ( 01 Jun 2017 06:31 )             31.7     06-01    139  |  109<H>  |  8   ----------------------------<  145<H>  4.2   |  20<L>  |  0.60    Ca    7.9<L>      01 Jun 2017 06:06  Phos  2.4     06-01  Mg     2.1     06-01    TPro  6.5  /  Alb  3.7  /  TBili  <0.2  /  DBili  x   /  AST  20  /  ALT  11  /  AlkPhos  56  05-31    PT/INR - ( 31 May 2017 16:18 )   PT: 12.5 sec;   INR: 1.12          PTT - ( 01 Jun 2017 06:06 )  PTT:40.1 sec    CARDIAC MARKERS ( 01 Jun 2017 06:06 )  x     / <0.01 ng/mL / 1160 U/L / x     / 13.4 ng/mL  CARDIAC MARKERS ( 01 Jun 2017 02:09 )  x     / <0.01 ng/mL / 1385 U/L / x     / 16.5 ng/mL      CAPILLARY BLOOD GLUCOSE  119 (01 Jun 2017 06:20)  127 (31 May 2017 23:00)  117 (31 May 2017 17:12)      Drug Levels: [] N/A    CSF Analysis: [] N/A      Allergies    No Known Allergies    Intolerances      MEDICATIONS:  Antibiotics:  minocycline 100milliGRAM(s) Oral two times a day    Neuro:  aspirin 325milliGRAM(s) Oral daily  acetaminophen   Tablet. 325milliGRAM(s) Oral every 4 hours PRN  acetaminophen   Tablet. 650milliGRAM(s) Oral every 6 hours PRN  ondansetron Injectable 4milliGRAM(s) IV Push every 6 hours PRN  OXcarbazepine 600milliGRAM(s) Oral two times a day    Anticoagulation:  heparin  Infusion 600Unit(s)/Hr IV Continuous <Continuous>    OTHER:  docusate sodium 100milliGRAM(s) Oral three times a day  bisacodyl 5milliGRAM(s) Oral daily PRN  senna 2Tablet(s) Oral at bedtime PRN  insulin lispro (HumaLOG) corrective regimen sliding scale  SubCutaneous Before meals and at bedtime  atorvastatin 80milliGRAM(s) Oral at bedtime  ranolazine 1000milliGRAM(s) Oral two times a day    IVF:  sodium chloride 0.9%. 1000milliLiter(s) IV Continuous <Continuous>  sodium chloride 0.9% Bolus 500milliLiter(s) IV Bolus once    CULTURES:    RADIOLOGY & ADDITIONAL TESTS:  Findings suggestive of left cerebral hemiatrophy. These   findings may be further evaluated with a MRI.    ASSESSMENT:  31y Female s/p left STA-MCA bypass POD 1 neuro stable    PLAN:  NEURO:  q1h neuro checks  pain control  angio today  trend HMV output    CARDIOVASCULAR:  SBP goal 120-140  a line for HD monitoring    PULMONARY:  comfortable RA    RENAL:  NS hydration while NPO    GI:  NPO for procedure    HEME:  h/h stable    ID:  afeb    ENDO:  ISS    DVT PROPHYLAXIS:  [x] Venodynes                                [] Heparin/Lovenox  -hep drip    DISPOSITION:   ICU status  full code   pt/ot pending after angio  d/w Dr. Long and Dr. Rubio HPI:  32 yo woman with extensive medical history including HTN, dyslipidemia, CAD vs coronary vasospasm S/P PTCA x 3, who recently presented with L hemispheric TIAs (intermittent speech difficulties) as well as H/A and seizures. Vascular imaging (MRA, DSA) revealed bilateral ICA occlusion with multiple sources of collateral flow to bilateral hemispheres (mostly PCA on R side, PCA and transdural MMA collaterals on L side), consistent with severe bilateral moyamoya. Perfusion imaging (SPECT, NOVA) showed asymmetric flow favoring R side, which is worse after acetazolamide challenge. Bilateral cerebral revascularization was thus indicated, starting with the more symptomatic L side. Patient is being admitted electively for L direct-indirect EC-IC bypass. (31 May 2017 15:12)    POD 1 s/p left STA-MCA bypass  CTH overnight obtained; stable  c/o CP, relieved with sublingual nitro  reporting left arm/hand numbness  some bleeding at pin site relieved with closure/dressing   pre op for angio today  hep drip at goal  hypotensive to 120's overnight improved without intervention    OVERNIGHT EVENTS:  Vital Signs Last 24 Hrs  T(C): 36.2, Max: 37.4 (06-01 @ 02:30)  T(F): 97.1, Max: 99.4 (06-01 @ 02:30)  HR: 82 (70 - 98)  BP: 143/72 (120/72 - 143/72)  BP(mean): 102 (90 - 102)  RR: 24 (10 - 24)  SpO2: 99% (94% - 100%)    I&O's Detail    I & Os for current day (as of 01 Jun 2017 07:02)  =============================================  IN:    sodium chloride 0.9%.: 1600 ml    Solution: 315 ml    IV PiggyBack: 200 ml    heparin Infusion: 66 ml    heparin  Infusion.: 18 ml    Total IN: 2199 ml  ---------------------------------------------  OUT:    Indwelling Catheter - Urethral: 1925 ml    Voided: 350 ml    Bulb: 135 ml    Total OUT: 2410 ml  ---------------------------------------------  Total NET: -211 ml    I&O's Summary    I & Os for current day (as of 01 Jun 2017 07:02)  =============================================  IN: 2199 ml / OUT: 2410 ml / NET: -211 ml      PHYSICAL EXAM:  Neurological:  A&O x 3, appears comfortable  EOMI, PERRL  PRATER X 4, no focal deficits   sensation intact b/l  incision site c/d/i  subgaleal TASHI in place    TUBES/LINES:  [] CVC  [x] A-line  [] Lumbar Drain  [] Ventriculostomy  [x] Other: wound drain    DIET:  [x] NPO  [] Mechanical  [] Tube feeds    LABS:                        10.3   11.1  )-----------( 168      ( 01 Jun 2017 06:31 )             31.7     06-01    139  |  109<H>  |  8   ----------------------------<  145<H>  4.2   |  20<L>  |  0.60    Ca    7.9<L>      01 Jun 2017 06:06  Phos  2.4     06-01  Mg     2.1     06-01    TPro  6.5  /  Alb  3.7  /  TBili  <0.2  /  DBili  x   /  AST  20  /  ALT  11  /  AlkPhos  56  05-31    PT/INR - ( 31 May 2017 16:18 )   PT: 12.5 sec;   INR: 1.12          PTT - ( 01 Jun 2017 06:06 )  PTT:40.1 sec    CARDIAC MARKERS ( 01 Jun 2017 06:06 )  x     / <0.01 ng/mL / 1160 U/L / x     / 13.4 ng/mL  CARDIAC MARKERS ( 01 Jun 2017 02:09 )  x     / <0.01 ng/mL / 1385 U/L / x     / 16.5 ng/mL      CAPILLARY BLOOD GLUCOSE  119 (01 Jun 2017 06:20)  127 (31 May 2017 23:00)  117 (31 May 2017 17:12)      Drug Levels: [] N/A    CSF Analysis: [] N/A      Allergies    No Known Allergies    Intolerances      MEDICATIONS:  Antibiotics:  minocycline 100milliGRAM(s) Oral two times a day    Neuro:  aspirin 325milliGRAM(s) Oral daily  acetaminophen   Tablet. 325milliGRAM(s) Oral every 4 hours PRN  acetaminophen   Tablet. 650milliGRAM(s) Oral every 6 hours PRN  ondansetron Injectable 4milliGRAM(s) IV Push every 6 hours PRN  OXcarbazepine 600milliGRAM(s) Oral two times a day    Anticoagulation:  heparin  Infusion 600Unit(s)/Hr IV Continuous <Continuous>    OTHER:  docusate sodium 100milliGRAM(s) Oral three times a day  bisacodyl 5milliGRAM(s) Oral daily PRN  senna 2Tablet(s) Oral at bedtime PRN  insulin lispro (HumaLOG) corrective regimen sliding scale  SubCutaneous Before meals and at bedtime  atorvastatin 80milliGRAM(s) Oral at bedtime  ranolazine 1000milliGRAM(s) Oral two times a day    IVF:  sodium chloride 0.9%. 1000milliLiter(s) IV Continuous <Continuous>  sodium chloride 0.9% Bolus 500milliLiter(s) IV Bolus once    CULTURES:    RADIOLOGY & ADDITIONAL TESTS:  Findings suggestive of left cerebral hemiatrophy. These   findings may be further evaluated with a MRI.    ASSESSMENT:  31y Female s/p left STA-MCA bypass POD 1 neuro stable    PLAN:  NEURO:  q1h neuro checks  pain control  angio today  trend HMV output    CARDIOVASCULAR:  SBP goal 120-180  a line for HD monitoring  nitro prn cp  will d/w primary cardiologist today    PULMONARY:  comfortable RA    RENAL:  NS hydration while NPO    GI:  NPO for procedure    HEME:  h/h stable    ID:  afeb    ENDO:  ISS    DVT PROPHYLAXIS:  [x] Venodynes                                [] Heparin/Lovenox  -hep drip    DISPOSITION:   ICU status  full code   pt/ot pending after angio  d/w Dr. Long and Dr. Rubio HPI:  30 yo woman with extensive medical history including HTN, dyslipidemia, CAD vs coronary vasospasm S/P PTCA x 3, who recently presented with L hemispheric TIAs (intermittent speech difficulties) as well as H/A and seizures. Vascular imaging (MRA, DSA) revealed bilateral ICA occlusion with multiple sources of collateral flow to bilateral hemispheres (mostly PCA on R side, PCA and transdural MMA collaterals on L side), consistent with severe bilateral moyamoya. Perfusion imaging (SPECT, NOVA) showed asymmetric flow favoring R side, which is worse after acetazolamide challenge. Bilateral cerebral revascularization was thus indicated, starting with the more symptomatic L side. Patient is being admitted electively for L direct-indirect EC-IC bypass. (31 May 2017 15:12)    POD 1 s/p left STA-MCA bypass  CTH overnight obtained; stable  c/o CP, relieved with sublingual nitro  reporting left arm/hand numbness  some bleeding at pin site relieved with closure/dressing   pre op for angio today  hep drip at goal  hypotensive to 120's overnight improved without intervention    OVERNIGHT EVENTS:  Vital Signs Last 24 Hrs  T(C): 36.2, Max: 37.4 (06-01 @ 02:30)  T(F): 97.1, Max: 99.4 (06-01 @ 02:30)  HR: 82 (70 - 98)  BP: 143/72 (120/72 - 143/72)  BP(mean): 102 (90 - 102)  RR: 24 (10 - 24)  SpO2: 99% (94% - 100%)    I&O's Detail    I & Os for current day (as of 01 Jun 2017 07:02)  =============================================  IN:    sodium chloride 0.9%.: 1600 ml    Solution: 315 ml    IV PiggyBack: 200 ml    heparin Infusion: 66 ml    heparin  Infusion.: 18 ml    Total IN: 2199 ml  ---------------------------------------------  OUT:    Indwelling Catheter - Urethral: 1925 ml    Voided: 350 ml    Bulb: 135 ml    Total OUT: 2410 ml  ---------------------------------------------  Total NET: -211 ml    I&O's Summary    I & Os for current day (as of 01 Jun 2017 07:02)  =============================================  IN: 2199 ml / OUT: 2410 ml / NET: -211 ml      PHYSICAL EXAM:  Neurological:  A&O x 3, appears comfortable  EOMI, PERRL  PRATER X 4, no focal deficits   sensation intact b/l  incision site c/d/i  subgaleal TASHI in place    TUBES/LINES:  [] CVC  [x] A-line  [] Lumbar Drain  [] Ventriculostomy  [x] Other: wound drain    DIET:  [x] NPO  [] Mechanical  [] Tube feeds    LABS:                        10.3   11.1  )-----------( 168      ( 01 Jun 2017 06:31 )             31.7     06-01    139  |  109<H>  |  8   ----------------------------<  145<H>  4.2   |  20<L>  |  0.60    Ca    7.9<L>      01 Jun 2017 06:06  Phos  2.4     06-01  Mg     2.1     06-01    TPro  6.5  /  Alb  3.7  /  TBili  <0.2  /  DBili  x   /  AST  20  /  ALT  11  /  AlkPhos  56  05-31    PT/INR - ( 31 May 2017 16:18 )   PT: 12.5 sec;   INR: 1.12          PTT - ( 01 Jun 2017 06:06 )  PTT:40.1 sec    CARDIAC MARKERS ( 01 Jun 2017 06:06 )  x     / <0.01 ng/mL / 1160 U/L / x     / 13.4 ng/mL  CARDIAC MARKERS ( 01 Jun 2017 02:09 )  x     / <0.01 ng/mL / 1385 U/L / x     / 16.5 ng/mL      CAPILLARY BLOOD GLUCOSE  119 (01 Jun 2017 06:20)  127 (31 May 2017 23:00)  117 (31 May 2017 17:12)      Drug Levels: [] N/A    CSF Analysis: [] N/A      Allergies    No Known Allergies    Intolerances      MEDICATIONS:  Antibiotics:  minocycline 100milliGRAM(s) Oral two times a day    Neuro:  aspirin 325milliGRAM(s) Oral daily  acetaminophen   Tablet. 325milliGRAM(s) Oral every 4 hours PRN  acetaminophen   Tablet. 650milliGRAM(s) Oral every 6 hours PRN  ondansetron Injectable 4milliGRAM(s) IV Push every 6 hours PRN  OXcarbazepine 600milliGRAM(s) Oral two times a day    Anticoagulation:  heparin  Infusion 600Unit(s)/Hr IV Continuous <Continuous>    OTHER:  docusate sodium 100milliGRAM(s) Oral three times a day  bisacodyl 5milliGRAM(s) Oral daily PRN  senna 2Tablet(s) Oral at bedtime PRN  insulin lispro (HumaLOG) corrective regimen sliding scale  SubCutaneous Before meals and at bedtime  atorvastatin 80milliGRAM(s) Oral at bedtime  ranolazine 1000milliGRAM(s) Oral two times a day    IVF:  sodium chloride 0.9%. 1000milliLiter(s) IV Continuous <Continuous>  sodium chloride 0.9% Bolus 500milliLiter(s) IV Bolus once    CULTURES:    RADIOLOGY & ADDITIONAL TESTS:  post op changes    ASSESSMENT:  31y Female s/p left STA-MCA bypass POD 1 neuro stable    PLAN:  NEURO:  q1h neuro checks  pain control tylenol  angio today  trend HMV output  cont asa  fu asa assay: consider switching back to brillanta    CARDIOVASCULAR:  SBP goal 140-160  a line for HD monitoring  nitro prn cp  will d/w primary cardiologist today  holding home isosorbide    PULMONARY:  comfortable RA    RENAL:  NS hydration while NPO  ferguson to gravity  dc after angio    GI:  NPO for procedure    HEME:  h/h stable    ID:  afeb    ENDO:  ISS    DVT PROPHYLAXIS:  [x] Venodynes                                [] Heparin/Lovenox  -hep drip    DISPOSITION:   ICU status  full code   pt/ot pending after angio  d/w Dr. Long and Dr. Rubio

## 2017-06-01 NOTE — PROVIDER CONTACT NOTE (OTHER) - ACTION/TREATMENT ORDERED:
12 lead EKG performed. Troponins sent to lab. 0.4mg nitro given. FAIZAN Pulido at bedside assessing pt. Will continue to monitor.

## 2017-06-01 NOTE — PROGRESS NOTE ADULT - SUBJECTIVE AND OBJECTIVE BOX
NEUROSURGERY POST OP CHECK:    Pt seen and examined at bedside, complaining of mild incisional pain    HPI:  30 yo woman with extensive medical history including HTN, dyslipidemia, CAD vs coronary vasospasm S/P PTCA x 3, who recently presented with L hemispheric TIAs (intermittent speech difficulties) as well as H/A and seizures. Vascular imaging (MRA, DSA) revealed bilateral ICA occlusion with multiple sources of collateral flow to bilateral hemispheres (mostly PCA on R side, PCA and transdural MMA collaterals on L side), consistent with severe bilateral moyamoya. Perfusion imaging (SPECT, NOVA) showed asymmetric flow favoring R side, which is worse after acetazolamide challenge. Bilateral cerebral revascularization was thus indicated, starting with the more symptomatic L side. Patient is being admitted electively for L direct-indirect EC-IC bypass. (31 May 2017 15:12)    OVERNIGHT EVENTS:  Vital Signs Last 24 Hrs  T(C): 37.3, Max: 37.4 (06-01 @ 02:30)  T(F): 99.1, Max: 99.4 (06-01 @ 02:30)  HR: 74 (70 - 98)  BP: 145/82 (120/72 - 145/82)  BP(mean): 108 (90 - 108)  RR: 15 (10 - 24)  SpO2: 100% (94% - 100%)    I&O's Detail  I & Os for 24h ending 01 Jun 2017 07:00  =============================================  IN:    sodium chloride 0.9%.: 1600 ml    Solution: 315 ml    IV PiggyBack: 200 ml    heparin Infusion: 66 ml    heparin  Infusion.: 18 ml    Total IN: 2199 ml  ---------------------------------------------  OUT:    Indwelling Catheter - Urethral: 1925 ml    Voided: 350 ml    Bulb: 135 ml    Total OUT: 2410 ml  ---------------------------------------------  Total NET: -211 ml    I & Os for current day (as of 01 Jun 2017 15:17)  =============================================  IN:    sodium chloride 0.9%.: 700 ml    heparin Infusion: 36 ml    Total IN: 736 ml  ---------------------------------------------  OUT:    Indwelling Catheter - Urethral: 1125 ml    Bulb: 60 ml    Total OUT: 1185 ml  ---------------------------------------------  Total NET: -449 ml    I&O's Summary  I & Os for 24h ending 01 Jun 2017 07:00  =============================================  IN: 2199 ml / OUT: 2410 ml / NET: -211 ml    I & Os for current day (as of 01 Jun 2017 15:17)  =============================================  IN: 736 ml / OUT: 1185 ml / NET: -449 ml      PHYSICAL EXAM:  Neurological:  AAOx3, NAD resting comfortably, FC  CNII-XII grossly intact, PERRL, EOMI, face symmetric, tongue midline     MAEx4, strength 5/5 UE and LE b/l, no drift  SILT throughout b/l  Incision/wound: groin incision clean, dry and intact; dressing in place    Vascular: distal pulses 2+ b/l    TUBES/LINES:  [] CVC  [] A-line  [] Lumbar Drain  [] Ventriculostomy  [x] Other  - TASHI drain (subgaleal)    DIET:  [x] NPO  [] Mechanical  [] Tube feeds    LABS:                        10.3   11.1  )-----------( 168      ( 01 Jun 2017 06:31 )             31.7     06-01    139  |  109<H>  |  8   ----------------------------<  145<H>  4.2   |  20<L>  |  0.60    Ca    7.9<L>      01 Jun 2017 06:06  Phos  2.4     06-01  Mg     2.1     06-01    TPro  6.5  /  Alb  3.7  /  TBili  <0.2  /  DBili  x   /  AST  20  /  ALT  11  /  AlkPhos  56  05-31    PT/INR - ( 31 May 2017 16:18 )   PT: 12.5 sec;   INR: 1.12          PTT - ( 01 Jun 2017 06:06 )  PTT:40.1 sec    CARDIAC MARKERS ( 01 Jun 2017 06:06 )  x     / <0.01 ng/mL / 1160 U/L / x     / 13.4 ng/mL  CARDIAC MARKERS ( 01 Jun 2017 02:09 )  x     / <0.01 ng/mL / 1385 U/L / x     / 16.5 ng/mL      CAPILLARY BLOOD GLUCOSE  115 (01 Jun 2017 10:00)  119 (01 Jun 2017 06:20)  127 (31 May 2017 23:00)  117 (31 May 2017 17:12)      Drug Levels: [] N/A    CSF Analysis: [] N/A      Allergies    No Known Allergies    Intolerances      MEDICATIONS:  Antibiotics:  minocycline 100milliGRAM(s) Oral two times a day    Neuro:  acetaminophen   Tablet. 325milliGRAM(s) Oral every 4 hours PRN  acetaminophen   Tablet. 650milliGRAM(s) Oral every 6 hours PRN  ondansetron Injectable 4milliGRAM(s) IV Push every 6 hours PRN  OXcarbazepine 600milliGRAM(s) Oral two times a day    Anticoagulation:  heparin  Infusion 600Unit(s)/Hr IV Continuous <Continuous>  ticagrelor 60milliGRAM(s) Oral two times a day  aspirin enteric coated 81milliGRAM(s) Oral daily    OTHER:  docusate sodium 100milliGRAM(s) Oral three times a day  bisacodyl 5milliGRAM(s) Oral daily PRN  senna 2Tablet(s) Oral at bedtime PRN  insulin lispro (HumaLOG) corrective regimen sliding scale  SubCutaneous Before meals and at bedtime  atorvastatin 80milliGRAM(s) Oral at bedtime  ranolazine 1000milliGRAM(s) Oral two times a day    IVF:  sodium chloride 0.9%. 1000milliLiter(s) IV Continuous <Continuous>  sodium chloride 0.9% Bolus 500milliLiter(s) IV Bolus once    CULTURES:    RADIOLOGY & ADDITIONAL TESTS:      ASSESSMENT:  31y Female s/p cerebral angiogram, POD1 from left STA-MCA bypass     PLAN:  NEURO:  -neuro checks q1hr  -HOB flat x 4 hours  -ASA 81mg daily  -Brilanta 60mg bid  -continue heparin drip for 32hrs after start of Brilanta (d/c heparin drip sat am)  -PTT 40.1 (goal 40-53)  -f/u regarding ASA assay level (587)  -continue oxcarbazapine  -continue minocycline   -pain control  -groin checks, pulse checks   -remove groin dressing in am    CARDIOVASCULAR:  --160  -continue nitro prn for CP    PULMONARY:  -room air    RENAL:  -IVF while NPO  -ferguson in place    GI:  -NPO until flatus   -GI ppx  -senna/colace    HEME:  -stable    ID:  -afebrile    ENDO:  -ISS    DVT PROPHYLAXIS:  [x] Venodynes                                [] Heparin/Lovenox    -d/w Dr. Long and Dr. Rubio NEUROSURGERY POST OP CHECK:    Pt seen and examined at bedside, complaining of mild incisional pain    HPI:  32 yo woman with extensive medical history including HTN, dyslipidemia, CAD vs coronary vasospasm S/P PTCA x 3, who recently presented with L hemispheric TIAs (intermittent speech difficulties) as well as H/A and seizures. Vascular imaging (MRA, DSA) revealed bilateral ICA occlusion with multiple sources of collateral flow to bilateral hemispheres (mostly PCA on R side, PCA and transdural MMA collaterals on L side), consistent with severe bilateral moyamoya. Perfusion imaging (SPECT, NOVA) showed asymmetric flow favoring R side, which is worse after acetazolamide challenge. Bilateral cerebral revascularization was thus indicated, starting with the more symptomatic L side. Patient is being admitted electively for L direct-indirect EC-IC bypass. (31 May 2017 15:12)    OVERNIGHT EVENTS:  Vital Signs Last 24 Hrs  T(C): 37.3, Max: 37.4 (06-01 @ 02:30)  T(F): 99.1, Max: 99.4 (06-01 @ 02:30)  HR: 74 (70 - 98)  BP: 145/82 (120/72 - 145/82)  BP(mean): 108 (90 - 108)  RR: 15 (10 - 24)  SpO2: 100% (94% - 100%)    I&O's Detail  I & Os for 24h ending 01 Jun 2017 07:00  =============================================  IN:    sodium chloride 0.9%.: 1600 ml    Solution: 315 ml    IV PiggyBack: 200 ml    heparin Infusion: 66 ml    heparin  Infusion.: 18 ml    Total IN: 2199 ml  ---------------------------------------------  OUT:    Indwelling Catheter - Urethral: 1925 ml    Voided: 350 ml    Bulb: 135 ml    Total OUT: 2410 ml  ---------------------------------------------  Total NET: -211 ml    I & Os for current day (as of 01 Jun 2017 15:17)  =============================================  IN:    sodium chloride 0.9%.: 700 ml    heparin Infusion: 36 ml    Total IN: 736 ml  ---------------------------------------------  OUT:    Indwelling Catheter - Urethral: 1125 ml    Bulb: 60 ml    Total OUT: 1185 ml  ---------------------------------------------  Total NET: -449 ml    I&O's Summary  I & Os for 24h ending 01 Jun 2017 07:00  =============================================  IN: 2199 ml / OUT: 2410 ml / NET: -211 ml    I & Os for current day (as of 01 Jun 2017 15:17)  =============================================  IN: 736 ml / OUT: 1185 ml / NET: -449 ml      PHYSICAL EXAM:  Neurological:  AAOx3, NAD resting comfortably, FC  CNII-XII grossly intact, PERRL, EOMI, face symmetric, tongue midline     MAEx4, strength 5/5 UE and LE b/l, no drift  SILT throughout b/l  Incision/wound: groin incision clean, dry and intact; dressing in place    Vascular: distal pulses 2+ b/l    TUBES/LINES:  [] CVC  [] A-line  [] Lumbar Drain  [] Ventriculostomy  [x] Other  - TASHI drain (subgaleal)    DIET:  [x] NPO  [] Mechanical  [] Tube feeds    LABS:                        10.3   11.1  )-----------( 168      ( 01 Jun 2017 06:31 )             31.7     06-01    139  |  109<H>  |  8   ----------------------------<  145<H>  4.2   |  20<L>  |  0.60    Ca    7.9<L>      01 Jun 2017 06:06  Phos  2.4     06-01  Mg     2.1     06-01    TPro  6.5  /  Alb  3.7  /  TBili  <0.2  /  DBili  x   /  AST  20  /  ALT  11  /  AlkPhos  56  05-31    PT/INR - ( 31 May 2017 16:18 )   PT: 12.5 sec;   INR: 1.12          PTT - ( 01 Jun 2017 06:06 )  PTT:40.1 sec    CARDIAC MARKERS ( 01 Jun 2017 06:06 )  x     / <0.01 ng/mL / 1160 U/L / x     / 13.4 ng/mL  CARDIAC MARKERS ( 01 Jun 2017 02:09 )  x     / <0.01 ng/mL / 1385 U/L / x     / 16.5 ng/mL      CAPILLARY BLOOD GLUCOSE  115 (01 Jun 2017 10:00)  119 (01 Jun 2017 06:20)  127 (31 May 2017 23:00)  117 (31 May 2017 17:12)      Drug Levels: [] N/A    CSF Analysis: [] N/A      Allergies    No Known Allergies    Intolerances      MEDICATIONS:  Antibiotics:  minocycline 100milliGRAM(s) Oral two times a day    Neuro:  acetaminophen   Tablet. 325milliGRAM(s) Oral every 4 hours PRN  acetaminophen   Tablet. 650milliGRAM(s) Oral every 6 hours PRN  ondansetron Injectable 4milliGRAM(s) IV Push every 6 hours PRN  OXcarbazepine 600milliGRAM(s) Oral two times a day    Anticoagulation:  heparin  Infusion 600Unit(s)/Hr IV Continuous <Continuous>  ticagrelor 60milliGRAM(s) Oral two times a day  aspirin enteric coated 81milliGRAM(s) Oral daily    OTHER:  docusate sodium 100milliGRAM(s) Oral three times a day  bisacodyl 5milliGRAM(s) Oral daily PRN  senna 2Tablet(s) Oral at bedtime PRN  insulin lispro (HumaLOG) corrective regimen sliding scale  SubCutaneous Before meals and at bedtime  atorvastatin 80milliGRAM(s) Oral at bedtime  ranolazine 1000milliGRAM(s) Oral two times a day    IVF:  sodium chloride 0.9%. 1000milliLiter(s) IV Continuous <Continuous>  sodium chloride 0.9% Bolus 500milliLiter(s) IV Bolus once    CULTURES:    RADIOLOGY & ADDITIONAL TESTS:      ASSESSMENT:  31y Female s/p cerebral angiogram, POD1 from left STA-MCA bypass     PLAN:  NEURO:  -neuro checks q1hr  -HOB flat x 4 hours  -ASA 81mg daily  -Brilanta 60mg bid  -continue heparin drip for 32hrs after start of Brilanta (d/c heparin drip sat am)  -PTT 40.1 (goal 40-53)  -f/u regarding ASA assay level (587)  -continue oxcarbazapine  -continue minocycline   -pain control  -groin checks, pulse checks   -remove groin dressing in am    CARDIOVASCULAR:  --160  -continue nitro prn for CP    PULMONARY:  -room air    RENAL:  -IVF while NPO  -ferguson in place    GI:  [id number]   -GI ppx  -senna/colace    HEME:  -stable    ID:  -afebrile    ENDO:  -ISS    DVT PROPHYLAXIS:  [x] Venodynes                                [] Heparin/Lovenox    -d/w Dr. Long and Dr. Rubio

## 2017-06-01 NOTE — PROGRESS NOTE ADULT - ATTENDING COMMENTS
PLAN:   NEURO: neurochecks q1h, PRN pain meds with Tylenol, no opiates / benzos while lethargic  s/p bypass:  continue ASA daily, heparin gtt; CT this evening; minocycline x at least 4 days  epilepsy: continue oxcarbazepine 600mg BID, d/c levetiracetam   SG TASHI - keep for now, monitor output  REHAB:  no physical therapy for now    EARLY MOB:  HOB 20 degrees; bedrest for now until cleared by neurosurgery    PULM:  Room air, incentive spirometry  CARDIO:  SBP goal 140-180 mm Hg; continue ranexa, hold ISMN and cardizem for now; may need BP augmentation if SBP drops below 140; continue high dose statin; ticagrelor on hold for now; consult cardio  ENDO:  Blood sugar goals 140-180 mg/dL, continue insulin sliding scale  GI:  docusate senna  DIET: bedside swallow once more awake  RENAL:  NS@100cc/hr  HEM/ONC: Hb stable; PTT goal 40-53 - heparin gtt per neuro protocol  VTE Prophylaxis: SCDs, on heparin drip  ID: afebrile, no leukocytosis  Social: family at bedside, updated    Patient at high risk for neurological deterioration or death due to:  stroke, intracranial bleed, delirium, hyperperfusion injury, seizures.  Critical care time, excluding procedures: 60 minutes. PLAN:   NEURO: neurochecks q1h, PRN pain meds with Tylenol, no opiates / benzos while lethargic  s/p bypass:  continue ASA daily, f/u aspirin assay, may need swithc to plavix; cont heparin gtt; minocycline x at least 4 days  epilepsy: continue oxcarbazepine 600mg BID  SG TASHI - keep for now, monitor output  REHAB:  no physical therapy for now    EARLY MOB:  HOB 20 degrees; bedrest for now until cleared by neurosurgery    PULM:  Room air, incentive spirometry  CARDIO:  SBP goal 140-160 mm Hg; continue ranexa, off ISMN and cardizem for now - may give nitrates PRN; continue high dose statin; ticagrelor on hold for now; consult cardio  ENDO:  Blood sugar goals 140-180 mg/dL, continue insulin sliding scale  GI:  docusate senna  DIET: NPO  RENAL:  NS@100cc/hr  HEM/ONC: Hb stable; PTT goal 40-53 - heparin gtt per neuro protocol  VTE Prophylaxis: SCDs, on heparin drip  ID: afebrile, no leukocytosis  Social: family at bedside, updated    Patient at high risk for neurological deterioration or death due to:  stroke, intracranial bleed, delirium, hyperperfusion injury, seizures.  Critical care time, excluding procedures: 45 minutes.

## 2017-06-01 NOTE — PROGRESS NOTE ADULT - SUBJECTIVE AND OBJECTIVE BOX
Surgery: Cerebral Angiogram  Consent: Signed by patient  Surgeon: Dr. Morris/Dr. Long    No Known Allergies      T(C): 37, Max: 37 (05-31 @ 21:18)  HR: 76 (74 - 98)  BP: 143/72 (120/72 - 143/72)  RR: 16 (10 - 17)  SpO2: 99% (94% - 100%)  Wt(kg): --    EXAM: NAD, AAOx3. [place] speaking  PERRL. EOMI. Left scalp incision C/D/I w/ dressing, hemovac  CNs II-XII intact. 4/5 in LUE, ,o/w 5/5 in RUE and LEs b/l. Sensation intact. Following commands.      05-31    141  |  108  |  7   ----------------------------<  143<H>  3.6   |  23  |  0.80    Ca    8.5      31 May 2017 16:18  Phos  2.6     05-31  Mg     1.8     05-31    TPro  6.5  /  Alb  3.7  /  TBili  <0.2  /  DBili  x   /  AST  20  /  ALT  11  /  AlkPhos  56  05-31    CBC Full  -  ( 31 May 2017 16:18 )  WBC Count : 8.4 K/uL  Hemoglobin : 11.1 g/dL  Hematocrit : 34.1 %  Platelet Count - Automated : 185 K/uL  Mean Cell Volume : 84.8 fL  Mean Cell Hemoglobin : 27.6 pg  Mean Cell Hemoglobin Concentration : 32.6 g/dL      PT/INR - ( 31 May 2017 16:18 )   PT: 12.5 sec;   INR: 1.12          PTT - ( 31 May 2017 20:02 )  PTT:53.6 sec    Pregnancy test:  Type & Screen (in past 72hrs): 5/31/17      Last dose of antiplatelet/anticoagulation drug: Currently on Heparin gtt, Aspirin         Assessment: 31 y.o. female w/ CAD, HLD, s/p stents x3 now s/p left STA-MCA bypass for cerebral angiogram in AM    Plan: NPO/IVF @ midnight  Consent in chart,  Continue heparin drip, goal 40-53 ptt,  AM labs  D/w Dr. Long

## 2017-06-01 NOTE — CONSULT NOTE ADULT - SUBJECTIVE AND OBJECTIVE BOX
Patient is a 31y old  Female who presents with a chief complaint of Bilateral moyamoya (31 May 2017 15:12)        HPI:  32 yo woman with extensive medical history including HTN, dyslipidemia, CAD vs coronary vasospasm S/P PTCA x 3, who recently presented with L hemispheric TIAs (intermittent speech difficulties) as well as H/A and seizures. Vascular imaging (MRA, DSA) revealed bilateral ICA occlusion with multiple sources of collateral flow to bilateral hemispheres (mostly PCA on R side, PCA and transdural MMA collaterals on L side), consistent with severe bilateral moyamoya. Perfusion imaging (SPECT, NOVA) showed asymmetric flow favoring R side, which is worse after acetazolamide challenge. Bilateral cerebral revascularization was thus indicated, starting with the more symptomatic L side. Patient is being admitted electively for L direct-indirect EC-IC bypass. (31 May 2017 15:12)     No new deficits after surgery- no headaches      Allergies  No Known Allergies      Health Issues  I67.5  No h/o HF  No pertinent family history  Handoff  TIA (transient ischemic attack)  Moyamoya disease  Dyslipidemia  CAD (coronary artery disease)  Essential hypertension  Epilepsy  Moyamoya  Moyamoya  Coronary stent occlusion  Other postprocedural status        FAMILY HISTORY:  No pertinent family history: No significant family history      MEDICATIONS  (STANDING):  aspirin 325milliGRAM(s) Oral daily  sodium chloride 0.9%. 1000milliLiter(s) IV Continuous <Continuous>  docusate sodium 100milliGRAM(s) Oral three times a day  insulin lispro (HumaLOG) corrective regimen sliding scale  SubCutaneous Before meals and at bedtime  minocycline 100milliGRAM(s) Oral two times a day  atorvastatin 80milliGRAM(s) Oral at bedtime  OXcarbazepine 600milliGRAM(s) Oral two times a day  ranolazine 1000milliGRAM(s) Oral two times a day  heparin  Infusion 600Unit(s)/Hr IV Continuous <Continuous>  sodium chloride 0.9% Bolus 500milliLiter(s) IV Bolus once    MEDICATIONS  (PRN):  acetaminophen   Tablet. 325milliGRAM(s) Oral every 4 hours PRN Mild Pain (1 - 3)  acetaminophen   Tablet. 650milliGRAM(s) Oral every 6 hours PRN Moderate Pain (4 - 6)  ondansetron Injectable 4milliGRAM(s) IV Push every 6 hours PRN Nausea and/or Vomiting  bisacodyl 5milliGRAM(s) Oral daily PRN Constipation  senna 2Tablet(s) Oral at bedtime PRN Constipation      PAST MEDICAL & SURGICAL HISTORY:  TIA (transient ischemic attack): x 4  Moyamoya disease  Dyslipidemia  CAD (coronary artery disease): Hx Cardiac stent which was re occluded then open  Essential hypertension: Hypertension  Epilepsy: Epilepsy  Coronary stent occlusion  Other postprocedural status: S/P       Labs                          10.3   11.1  )-----------( 168      ( 2017 06:31 )             31.7     06-01    139  |  109<H>  |  8   ----------------------------<  145<H>  4.2   |  20<L>  |  0.60    Ca    7.9<L>      2017 06:06  Phos  2.4     06-01  Mg     2.1     06-01    TPro  6.5  /  Alb  3.7  /  TBili  <0.2  /  DBili  x   /  AST  20  /  ALT  11  /  AlkPhos  56  -      Radiology:    Physical Exam    MENTAL STATUS  -Level of Consciousness- awake    Orientation- person,  Language- aphasia/ dysarthria  Memory- recent and remote      Cranial Nerve 1- 12  Pupils- equal and reactive  Eye movements- no diplopia  Facial - no asymmetry   Lower CN-nl    Gait and Station-n/a    MOTOR  Upper-nl  Lower-nl    Reflexes-nl    Sensation nl    Cerebellar-nl    vascular -    Assessment- Perez Perez    Plan as per NS- no new deficits post op

## 2017-06-01 NOTE — PROGRESS NOTE ADULT - SUBJECTIVE AND OBJECTIVE BOX
=================================  NEUROCRITICAL CARE ATTENDING NOTE  =================================    STEPHANE NICOLE   [id number]  Summary:  31F with Hypertension, dyslipidemia, CAD, s/p stents x3, presented with intermittent speech difficulties, HA, seizures, diagnosed with TIAs.  Imaging revealed bilatearl ICA occlusion, Moyamoya pattern.  SPECT / NOVA showed asymmetric flow favoring R, with worsening perfusion on acetazolamide.  Admitted  for elective L direct and indirect bypass; bypass clotted in the OR - given extra doses of ASA, started on heparin drip  Overnight Events: chest pain x1 overnight, EKG Trop NEG, given NTG    PAST MEDICAL & SURGICAL HISTORY: TIA (transient ischemic attack): x 4 Moyamoya disease Dyslipidemia CAD (coronary artery disease): Hx Cardiac stent which was re occluded then open Essential hypertension: Hypertension Epilepsy: Epilepsy Coronary stent occlusion Other postprocedural status: S/P   NKDA  Home meds: NTG PRN, ISMN 30mg daily ticagrelor 90mg BID atorvastatin 80mg HS  daily ranexa 1g ER BID cardizem CD daily oxcarbazepine 600mg BID    PHYSICAL EXAMINATION  T(C): , Max: 37.4 (- @ 02:30) HR: 82 (70 - 98) BP: 143/72 (120/72 - 143/72) RR: 24 (10 - 24) SpO2: 99% (94% - 100%)  NEUROLOGIC EXAMINATION:  Patient is lethargic but easily rousable, fully oriented, follows commands, pupils 3mm equal and briskly reactive to light, EOMs intact, moves all 4s  GENERAL:  not intubated, not in cardiorespiratory distress  EENT: anicteric  CARDIOVASC:  (+) S1 S2, normal rate and regular rhythm  PULMONARY:  clear to auscultation bilaterally  ABDOMEN:  soft, nontender, with normoactive bowel sounds  EXTREMITIES:  no edema  SKIN:  no rash    LABS:  CAPILLARY BLOOD GLUCOSE 119 (2017 06:20) 127 (31 May 2017 23:00) 117 (31 May 2017 17:12)                        10.3   11.1  )-----------( 168      ( 2017 06:31 )             31.7     139  |  109<H>  |  8   ----------------------------<  145<H>  4.2   |  20<L>  |  0.60    Ca    7.9<L>      2017 06:06  Phos  2.4     06  Mg     2.1         TPro  6.5  /  Alb  3.7  /  TBili  <0.2  /  DBili  x   /  AST  20  /  ALT  11  /  AlkPhos  56  05-31    I & Os for current day (as of  @ 07:39)  IN: 2199 ml / OUT: 2410 ml / NET: -211 ml    Bacteriology:  CSF studies:  EEG:  Neuroimaging: no recent imaging  Other imaging:    MEDICATIONS: asa 325 daily docusate bisacodyl senna mod ISS minocycline 100mg BID atorvastatin 80mg HS oxcarbazepine 600 mg BID ranolazine 1G BID    IV FLUIDS: NS  DRIPS: heprarin gtt  DIET: NPO  Lines / Drains: Lexie Rosa SG JP    CODE STATUS:  full code                       GOALS OF CARE:  aggressive                      DISPOSITION:  ICU =================================  NEUROCRITICAL CARE ATTENDING NOTE  =================================    STEPHANE NICOLE   [id number]  Summary:  31F with Hypertension, dyslipidemia, CAD, s/p stents x3, presented with intermittent speech difficulties, HA, seizures, diagnosed with TIAs.  Imaging revealed bilatearl ICA occlusion, Moyamoya pattern.  SPECT / NOVA showed asymmetric flow favoring R, with worsening perfusion on acetazolamide.  Admitted  for elective L direct and indirect bypass; bypass clotted in the OR - given extra doses of ASA, started on heparin drip  Overnight Events: chest pain x1 overnight, EKG Trop NEG, given NTG    PAST MEDICAL & SURGICAL HISTORY: TIA (transient ischemic attack): x 4 Moyamoya disease Dyslipidemia CAD (coronary artery disease): Hx Cardiac stent which was re occluded then open Essential hypertension: Hypertension Epilepsy: Epilepsy Coronary stent occlusion Other postprocedural status: S/P   NKDA  Home meds: NTG PRN, ISMN 30mg daily ticagrelor 90mg BID atorvastatin 80mg HS  daily ranexa 1g ER BID cardizem CD daily oxcarbazepine 600mg BID    PHYSICAL EXAMINATION  T(C): , Max: 37.4 (- @ 02:30) HR: 82 (70 - 98) BP: 143/72 (120/72 - 143/72) RR: 24 (10 - 24) SpO2: 99% (94% - 100%)  NEUROLOGIC EXAMINATION:  Patient is awake, alert, fully oriented pupils 3mm equal and briskly reactive to light, EOMs intact, 5/5 all 4s  GENERAL:  not intubated, not in cardiorespiratory distress  EENT: anicteric  CARDIOVASC:  (+) S1 S2, normal rate and regular rhythm  PULMONARY:  clear to auscultation bilaterally  ABDOMEN:  soft, nontender, with normoactive bowel sounds  EXTREMITIES:  no edema  SKIN:  no rash    LABS:  CAPILLARY BLOOD GLUCOSE 119 (2017 06:20) 127 (31 May 2017 23:00) 117 (31 May 2017 17:12)                        10.3   11.1  )-----------( 168      ( 2017 06:31 )             31.7     139  |  109<H>  |  8   ----------------------------<  145<H>  4.2   |  20<L>  |  0.60    Ca    7.9<L>      2017 06:06  Phos  2.4     06-  Mg     2.1     -    TPro  6.5  /  Alb  3.7  /  TBili  <0.2  /  DBili  x   /  AST  20  /  ALT  11  /  AlkPhos  56  05-    PTT 40.1      I & Os for current day (as of  @ 07:39)  IN: 2199 ml / OUT: 2410 ml / NET: -211 ml    Bacteriology:  CSF studies:  EEG:  Neuroimaging: CT  post-op changes  Other imaging:    MEDICATIONS: asa 325 daily docusate bisacodyl senna mod ISS minocycline 100mg BID atorvastatin 80mg HS oxcarbazepine 600 mg BID ranolazine 1G BID    IV FLUIDS: NS@100cc/hr  DRIPS: heprarin gtt  DIET: NPO  Lines / Drains: Lexie Rosa SG TASHI (output:135)    CODE STATUS:  full code                       GOALS OF CARE:  aggressive                      DISPOSITION:  ICU

## 2017-06-02 DIAGNOSIS — I67.5 MOYAMOYA DISEASE: ICD-10-CM

## 2017-06-02 DIAGNOSIS — D50.0 IRON DEFICIENCY ANEMIA SECONDARY TO BLOOD LOSS (CHRONIC): ICD-10-CM

## 2017-06-02 LAB
ANION GAP SERPL CALC-SCNC: 11 MMOL/L — SIGNIFICANT CHANGE UP (ref 5–17)
APTT BLD: 25.8 SEC — LOW (ref 27.5–37.4)
APTT BLD: 40.8 SEC — HIGH (ref 27.5–37.4)
APTT BLD: 51.6 SEC — HIGH (ref 27.5–37.4)
APTT BLD: 57.8 SEC — HIGH (ref 27.5–37.4)
BUN SERPL-MCNC: 9 MG/DL — SIGNIFICANT CHANGE UP (ref 7–23)
CALCIUM SERPL-MCNC: 7.5 MG/DL — LOW (ref 8.4–10.5)
CHLORIDE SERPL-SCNC: 107 MMOL/L — SIGNIFICANT CHANGE UP (ref 96–108)
CO2 SERPL-SCNC: 21 MMOL/L — LOW (ref 22–31)
CREAT SERPL-MCNC: 0.5 MG/DL — SIGNIFICANT CHANGE UP (ref 0.5–1.3)
GLUCOSE SERPL-MCNC: 94 MG/DL — SIGNIFICANT CHANGE UP (ref 70–99)
HCT VFR BLD CALC: 29 % — LOW (ref 34.5–45)
HGB BLD-MCNC: 9.4 G/DL — LOW (ref 11.5–15.5)
INR BLD: 1.09 — SIGNIFICANT CHANGE UP (ref 0.88–1.16)
MAGNESIUM SERPL-MCNC: 1.9 MG/DL — SIGNIFICANT CHANGE UP (ref 1.6–2.6)
MCHC RBC-ENTMCNC: 27.7 PG — SIGNIFICANT CHANGE UP (ref 27–34)
MCHC RBC-ENTMCNC: 32.4 G/DL — SIGNIFICANT CHANGE UP (ref 32–36)
MCV RBC AUTO: 85.5 FL — SIGNIFICANT CHANGE UP (ref 80–100)
PHOSPHATE SERPL-MCNC: 2.6 MG/DL — SIGNIFICANT CHANGE UP (ref 2.5–4.5)
PLATELET # BLD AUTO: 156 K/UL — SIGNIFICANT CHANGE UP (ref 150–400)
POTASSIUM SERPL-MCNC: 4.1 MMOL/L — SIGNIFICANT CHANGE UP (ref 3.5–5.3)
POTASSIUM SERPL-SCNC: 4.1 MMOL/L — SIGNIFICANT CHANGE UP (ref 3.5–5.3)
PROTHROM AB SERPL-ACNC: 12.1 SEC — SIGNIFICANT CHANGE UP (ref 9.8–12.7)
RBC # BLD: 3.39 M/UL — LOW (ref 3.8–5.2)
RBC # FLD: 17.5 % — HIGH (ref 10.3–16.9)
SODIUM SERPL-SCNC: 139 MMOL/L — SIGNIFICANT CHANGE UP (ref 135–145)
WBC # BLD: 8.2 K/UL — SIGNIFICANT CHANGE UP (ref 3.8–10.5)
WBC # FLD AUTO: 8.2 K/UL — SIGNIFICANT CHANGE UP (ref 3.8–10.5)

## 2017-06-02 PROCEDURE — 71010: CPT | Mod: 26

## 2017-06-02 PROCEDURE — 99233 SBSQ HOSP IP/OBS HIGH 50: CPT

## 2017-06-02 PROCEDURE — 70544 MR ANGIOGRAPHY HEAD W/O DYE: CPT | Mod: 26

## 2017-06-02 PROCEDURE — 99222 1ST HOSP IP/OBS MODERATE 55: CPT

## 2017-06-02 PROCEDURE — 99291 CRITICAL CARE FIRST HOUR: CPT | Mod: 24

## 2017-06-02 PROCEDURE — 70450 CT HEAD/BRAIN W/O DYE: CPT | Mod: 26

## 2017-06-02 RX ORDER — SODIUM CHLORIDE 9 MG/ML
1000 INJECTION INTRAMUSCULAR; INTRAVENOUS; SUBCUTANEOUS
Qty: 0 | Refills: 0 | Status: DISCONTINUED | OUTPATIENT
Start: 2017-06-02 | End: 2017-06-03

## 2017-06-02 RX ORDER — ISOSORBIDE MONONITRATE 60 MG/1
30 TABLET, EXTENDED RELEASE ORAL DAILY
Qty: 0 | Refills: 0 | Status: DISCONTINUED | OUTPATIENT
Start: 2017-06-02 | End: 2017-06-04

## 2017-06-02 RX ORDER — HEPARIN SODIUM 5000 [USP'U]/ML
700 INJECTION INTRAVENOUS; SUBCUTANEOUS
Qty: 25000 | Refills: 0 | Status: DISCONTINUED | OUTPATIENT
Start: 2017-06-02 | End: 2017-06-02

## 2017-06-02 RX ORDER — HEPARIN SODIUM 5000 [USP'U]/ML
800 INJECTION INTRAVENOUS; SUBCUTANEOUS
Qty: 25000 | Refills: 0 | Status: DISCONTINUED | OUTPATIENT
Start: 2017-06-02 | End: 2017-06-02

## 2017-06-02 RX ORDER — HEPARIN SODIUM 5000 [USP'U]/ML
850 INJECTION INTRAVENOUS; SUBCUTANEOUS
Qty: 25000 | Refills: 0 | Status: DISCONTINUED | OUTPATIENT
Start: 2017-06-02 | End: 2017-06-03

## 2017-06-02 RX ORDER — HEPARIN SODIUM 5000 [USP'U]/ML
900 INJECTION INTRAVENOUS; SUBCUTANEOUS
Qty: 25000 | Refills: 0 | Status: DISCONTINUED | OUTPATIENT
Start: 2017-06-02 | End: 2017-06-02

## 2017-06-02 RX ORDER — SODIUM CHLORIDE 9 MG/ML
500 INJECTION INTRAMUSCULAR; INTRAVENOUS; SUBCUTANEOUS ONCE
Qty: 0 | Refills: 0 | Status: COMPLETED | OUTPATIENT
Start: 2017-06-02 | End: 2017-06-02

## 2017-06-02 RX ORDER — ACETAMINOPHEN 500 MG
1000 TABLET ORAL ONCE
Qty: 0 | Refills: 0 | Status: COMPLETED | OUTPATIENT
Start: 2017-06-02 | End: 2017-06-03

## 2017-06-02 RX ADMIN — SODIUM CHLORIDE 50 MILLILITER(S): 9 INJECTION INTRAMUSCULAR; INTRAVENOUS; SUBCUTANEOUS at 11:51

## 2017-06-02 RX ADMIN — OXCARBAZEPINE 600 MILLIGRAM(S): 300 TABLET, FILM COATED ORAL at 06:06

## 2017-06-02 RX ADMIN — ISOSORBIDE MONONITRATE 30 MILLIGRAM(S): 60 TABLET, EXTENDED RELEASE ORAL at 11:50

## 2017-06-02 RX ADMIN — RANOLAZINE 1000 MILLIGRAM(S): 500 TABLET, FILM COATED, EXTENDED RELEASE ORAL at 06:06

## 2017-06-02 RX ADMIN — TICAGRELOR 60 MILLIGRAM(S): 90 TABLET ORAL at 06:05

## 2017-06-02 RX ADMIN — OXCARBAZEPINE 600 MILLIGRAM(S): 300 TABLET, FILM COATED ORAL at 18:47

## 2017-06-02 RX ADMIN — Medication 325 MILLIGRAM(S): at 22:57

## 2017-06-02 RX ADMIN — MINOCYCLINE HYDROCHLORIDE 100 MILLIGRAM(S): 45 TABLET, EXTENDED RELEASE ORAL at 18:46

## 2017-06-02 RX ADMIN — Medication 100 MILLIGRAM(S): at 21:16

## 2017-06-02 RX ADMIN — MINOCYCLINE HYDROCHLORIDE 100 MILLIGRAM(S): 45 TABLET, EXTENDED RELEASE ORAL at 06:06

## 2017-06-02 RX ADMIN — Medication 100 MILLIGRAM(S): at 06:06

## 2017-06-02 RX ADMIN — TICAGRELOR 60 MILLIGRAM(S): 90 TABLET ORAL at 20:07

## 2017-06-02 RX ADMIN — RANOLAZINE 1000 MILLIGRAM(S): 500 TABLET, FILM COATED, EXTENDED RELEASE ORAL at 18:46

## 2017-06-02 RX ADMIN — Medication 325 MILLIGRAM(S): at 21:29

## 2017-06-02 RX ADMIN — ONDANSETRON 4 MILLIGRAM(S): 8 TABLET, FILM COATED ORAL at 11:38

## 2017-06-02 RX ADMIN — SODIUM CHLORIDE 500 MILLILITER(S): 9 INJECTION INTRAMUSCULAR; INTRAVENOUS; SUBCUTANEOUS at 00:30

## 2017-06-02 RX ADMIN — ATORVASTATIN CALCIUM 80 MILLIGRAM(S): 80 TABLET, FILM COATED ORAL at 21:16

## 2017-06-02 NOTE — PROGRESS NOTE ADULT - SUBJECTIVE AND OBJECTIVE BOX
INTERVAL HISTORY: feeling better. Mild chest pain (chronic). No SOB. No palpitations.   	  MEDICATIONS:  ranolazine 1000milliGRAM(s) Oral two times a day  isosorbide   mononitrate ER Tablet (IMDUR) 30milliGRAM(s) Oral daily  minocycline 100milliGRAM(s) Oral two times a day  acetaminophen   Tablet. 325milliGRAM(s) Oral every 4 hours PRN  acetaminophen   Tablet. 650milliGRAM(s) Oral every 6 hours PRN  ondansetron Injectable 4milliGRAM(s) IV Push every 6 hours PRN  OXcarbazepine 600milliGRAM(s) Oral two times a day  docusate sodium 100milliGRAM(s) Oral three times a day  bisacodyl 5milliGRAM(s) Oral daily PRN  senna 2Tablet(s) Oral at bedtime PRN  insulin lispro (HumaLOG) corrective regimen sliding scale  SubCutaneous Before meals and at bedtime  atorvastatin 80milliGRAM(s) Oral at bedtime  ticagrelor 60milliGRAM(s) Oral two times a day  heparin  Infusion 900Unit(s)/Hr IV Continuous <Continuous>  sodium chloride 0.9%. 1000milliLiter(s) IV Continuous <Continuous>      PHYSICAL EXAM:  T(C): 35.6, Max: 37.3 (06-01 @ 13:29)  HR: 80 (74 - 96)  BP: 145/82 (145/82 - 145/82)  RR: 17 (10 - 30)  SpO2: 98% (96% - 100%)  Wt(kg): --  I&O's Summary  I & Os for 24h ending 02 Jun 2017 07:00  =============================================  IN: 2997 ml / OUT: 3590 ml / NET: -593 ml    I & Os for current day (as of 02 Jun 2017 10:35)  =============================================  IN: 127 ml / OUT: 350 ml / NET: -223 ml    Appearance: Normal	  HEENT:   Normal oral mucosa, PERRL, EOMI	  Lymphatic: No lymphadenopathy  Cardiovascular: Normal S1 S2, No JVD, No murmurs   Respiratory: Lungs clear to auscultation	  Psychiatry: A & O x 3  Gastrointestinal:  Soft, Non-tender, + BS	  Skin: No rashes, No ecchymoses, No cyanosis  Neurologic: Non-focal  Extremities: No clubbing, cyanosis or edema    LABS:	 	                       9.4    8.2   )-----------( 156      ( 02 Jun 2017 06:15 )             29.0     06-02    139  |  107  |  9   ----------------------------<  94  4.1   |  21<L>  |  0.50    Ca    7.5<L>      02 Jun 2017 06:15  Phos  2.6     06-02  Mg     1.9     06-02    TPro  6.5  /  Alb  3.7  /  TBili  <0.2  /  DBili  x   /  AST  20  /  ALT  11  /  AlkPhos  56  05-31    ASSESSMENT/PLAN: 	32 yo F with Ana Perez dz, seizure d/o, CAD s/p PCI x 3 to pLAD (last one in 3/2016), normal LVEF, chronic angina 2/2 coronary spasms and jailed small LCx by LAD stent going into distal left main, s/p combined direct /indirect L EC-IC bypass on 5/31/17.   1. Restart Cardizem when BP parameters allow. No back on Imdur ER - home dose 60 mg qd - titrate up when BP parameters allow    2. Brillinta at 60 mg q12h (does not need to be on 90 q12h since it has been more than 1 yr since her last PCI). d/c heparin gtt once Brillinta has been in the system for 48 hrs. ? role of even low dose ASA in addition to Brillinta since she is ASA resistant.      3. Care per primary team

## 2017-06-02 NOTE — CONSULT NOTE ADULT - ASSESSMENT
young patient with severe vascular disease, requiring heart stenting , now s/p revascularization , had repeat thrombosis of stent, tolerated IV Heparin well post op.Pt hypercoagulable w/u was NEGATIVE.

## 2017-06-02 NOTE — DIETITIAN INITIAL EVALUATION ADULT. - OTHER INFO
Per MD notes "31F with Sandra sandra disease s/p combined direct /indirect L EC-IC bypass (05/31/17); dyslipidemia, CAD, Hypertension, h/o epilepsy, s/p coronary stents". Pt reports lack of appetite s/p surgery. Encouraged protein/fiber intake & nutrient-dense foods. Reports no BM yet since admission. States she gained weight prior to her surgery. Declined need for PO supplements at this time.

## 2017-06-02 NOTE — CONSULT NOTE ADULT - ATTENDING COMMENTS
In view of her hx of recurrent thrombosis, I recommend SQ Lovenox upon DC x2 weeks    f/u in my office in 2 weeks

## 2017-06-02 NOTE — PROGRESS NOTE ADULT - SUBJECTIVE AND OBJECTIVE BOX
=================================  NEUROCRITICAL CARE ATTENDING NOTE  =================================    STEPHANE NICOLE   [id number]  Summary:  31F with Hypertension, dyslipidemia, CAD, s/p stents x3, presented with intermittent speech difficulties, HA, seizures, diagnosed with TIAs.  Imaging revealed bilatearl ICA occlusion, Moyamoya pattern.  SPECT / NOVA showed asymmetric flow favoring R, with worsening perfusion on acetazolamide.  Admitted  for elective L direct and indirect bypass; bypass clotted in the OR - given extra doses of ASA, started on heparin drip  Overnight Events:     PAST MEDICAL & SURGICAL HISTORY: TIA (transient ischemic attack): x 4 Moyamoya disease Dyslipidemia CAD (coronary artery disease): Hx Cardiac stent which was re occluded then open Essential hypertension: Hypertension Epilepsy: Epilepsy Coronary stent occlusion Other postprocedural status: S/P   NKDA  Home meds: NTG PRN, ISMN 30mg daily ticagrelor 90mg BID atorvastatin 80mg HS  daily ranexa 1g ER BID cardizem CD daily oxcarbazepine 600mg BID    PHYSICAL EXAMINATION  T(C): , Max: 37.3 (-01 @ 13:29) HR: 82 (70 - 96) BP: 145/82 (145/82 - 145/82) RR: 29 (10 - 29) SpO2: 96% (96% - 100%)  NEUROLOGIC EXAMINATION:  Patient is awake, alert, fully oriented pupils 3mm equal and briskly reactive to light, EOMs intact, 5/5 all 4s  GENERAL:  not intubated, not in cardiorespiratory distress  EENT: anicteric  CARDIOVASC:  (+) S1 S2, normal rate and regular rhythm  PULMONARY:  clear to auscultation bilaterally  ABDOMEN:  soft, nontender, with normoactive bowel sounds  EXTREMITIES:  no edema  SKIN:  no rash    LABS:  CAPILLARY BLOOD GLUCOSE 106 (2017 16:30) 115 (2017 10:00)                        9.4    8.2   )-----------( 156      ( 2017 06:15 )             29.0     139  |  107  |  9   ----------------------------<  94  4.1   |  21<L>  |  0.50    Ca    7.5<L>      2017 06:15  Phos  2.6       Mg     1.9         TPro  6.5  /  Alb  3.7  /  TBili  <0.2  /  DBili  x   /  AST  20  /  ALT  11  /  AlkPhos  56      I & Os for 24h ending  @ 07:00  IN: 2997 ml / OUT: 3590 ml / NET: -593 ml    Bacteriology:  CSF studies:  EEG:  Neuroimaging: CT  post-op changes  Other imagin/01 CXR: no acute pathology    MEDICATIONS: docusate bisacodyl senna mod ISS minocycline 100mg BID atorvastatin 80mg HS oxcarbazepine 600mg BID ranolazine 1g BID ticagrelor 60mg BID     IV FLUIDS: NS@100cc/hr  DRIPS: heprarin gtt  DIET: NPO  Lines / Drains: Lexie Rosa SG JP (output:135)    CODE STATUS:  full code                       GOALS OF CARE:  aggressive                      DISPOSITION:  ICU =================================  NEUROCRITICAL CARE ATTENDING NOTE  =================================    STEPHANE NICOLE   MRN-[id number]  Summary:  31F with Hypertension, dyslipidemia, CAD, s/p stents x3, presented with intermittent speech difficulties, HA, seizures, diagnosed with TIAs.  Imaging revealed bilatearl ICA occlusion, Moyamoya pattern.  SPECT / NOVA showed asymmetric flow favoring R, with worsening perfusion on acetazolamide.  Admitted  for elective L direct and indirect bypass; bypass clotted in the OR - given extra doses of ASA, started on heparin drip  Overnight Events: no significant events overnight    PAST MEDICAL & SURGICAL HISTORY: TIA (transient ischemic attack): x 4 Moyamoya disease Dyslipidemia CAD (coronary artery disease): Hx Cardiac stent which was re occluded then open Essential hypertension: Hypertension Epilepsy: Epilepsy Coronary stent occlusion Other postprocedural status: S/P   NKDA  Home meds: NTG PRN, ISMN 30mg daily ticagrelor 90mg BID atorvastatin 80mg HS  daily ranexa 1g ER BID cardizem CD daily oxcarbazepine 600mg BID    PHYSICAL EXAMINATION  T(C): , Max: 37.3 (- @ 13:29) HR: 82 (70 - 96) BP: 145/82 (145/82 - 145/82) RR: 29 (10 - 29) SpO2: 96% (96% - 100%)  NEUROLOGIC EXAMINATION:  Patient is awake, alert, fully oriented pupils 3mm equal and briskly reactive to light, EOMs intact, 5/5 all 4s  GENERAL:  not intubated, not in cardiorespiratory distress  EENT: anicteric  CARDIOVASC:  (+) S1 S2, normal rate and regular rhythm  PULMONARY:  clear to auscultation bilaterally  ABDOMEN:  soft, nontender, with normoactive bowel sounds  EXTREMITIES:  no edema  SKIN:  no rash    LABS:  CAPILLARY BLOOD GLUCOSE 106 (2017 16:30) 115 (2017 10:00)             (11.1)  9.4  (10.3)  8.2   )-----------( 156      ( 2017 06:15 )             29.0     139  |  107  |  9   ----------------------------<  94  4.1   |  21<L>  |  0.50    Ca    7.5<L>      2017 06:15  Phos  2.6     -  Mg     1.9         TPro  6.5  /  Alb  3.7  /  TBili  <0.2  /  DBili  x   /  AST  20  /  ALT  11  /  AlkPhos  56      PTT 25.8    I & Os for 24h ending  @ 07:00  IN: 2997 ml / OUT: 3590 ml / NET: -593 ml    Bacteriology:  CSF studies:  EEG:  Neuroimaging: CT  post-op changes  Other imagin/01 CXR: no acute pathology    MEDICATIONS: docusate bisacodyl senna mod ISS minocycline 100mg BID atorvastatin 80mg HS oxcarbazepine 600mg BID ranolazine 1g BID ticagrelor 60mg BID     IV FLUIDS: NS@100cc/hr  DRIPS: heprarin gtt  DIET: NPO  Lines / Drains: Lexie Rosa SG JP (output:170/24h)    CODE STATUS:  full code                       GOALS OF CARE:  aggressive                      DISPOSITION:  ICU

## 2017-06-02 NOTE — CONSULT NOTE ADULT - SUBJECTIVE AND OBJECTIVE BOX
HPI:  30 yo woman with extensive medical history including HTN, dyslipidemia, CAD vs coronary vasospasm S/P PTCA x 3, who recently presented with L hemispheric TIAs (intermittent speech difficulties) as well as H/A and seizures. Vascular imaging (MRA, DSA) revealed bilateral ICA occlusion with multiple sources of collateral flow to bilateral hemispheres (mostly PCA on R side, PCA and transdural MMA collaterals on L side), consistent with severe bilateral moyamoya. Perfusion imaging (SPECT, NOVA) showed asymmetric flow favoring R side, which is worse after acetazolamide challenge. Bilateral cerebral revascularization was thus indicated, starting with the more symptomatic L side. Patient is being admitted electively for L direct-indirect EC-IC bypass. (31 May 2017 15:12)      PAST MEDICAL & SURGICAL HISTORY:  TIA (transient ischemic attack): x 4  Moyamoya disease  Dyslipidemia  CAD (coronary artery disease): Hx Cardiac stent which was re occluded then open  Essential hypertension: Hypertension  Epilepsy: Epilepsy  Coronary stent occlusion  Other postprocedural status: S/P        Review of Systems:  · General	negative  · Skin/Breast	negative  · Ophthalmologic	negative  · ENMT	negative  · Respiratory and Thorax	negative  · Cardiovascular	negative  · Gastrointestinal	negative  · Genitourinary	negative  · Musculoskeletal Comments	negative  · Neurological	negative      MEDICATIONS  (STANDING):  docusate sodium 100milliGRAM(s) Oral three times a day  insulin lispro (HumaLOG) corrective regimen sliding scale  SubCutaneous Before meals and at bedtime  minocycline 100milliGRAM(s) Oral two times a day  atorvastatin 80milliGRAM(s) Oral at bedtime  OXcarbazepine 600milliGRAM(s) Oral two times a day  ranolazine 1000milliGRAM(s) Oral two times a day  ticagrelor 60milliGRAM(s) Oral two times a day  heparin  Infusion 900Unit(s)/Hr IV Continuous <Continuous>  sodium chloride 0.9%. 1000milliLiter(s) IV Continuous <Continuous>  isosorbide   mononitrate ER Tablet (IMDUR) 30milliGRAM(s) Oral daily  acetaminophen  IVPB. 1000milliGRAM(s) IV Intermittent once    MEDICATIONS  (PRN):  acetaminophen   Tablet. 325milliGRAM(s) Oral every 4 hours PRN Mild Pain (1 - 3)  acetaminophen   Tablet. 650milliGRAM(s) Oral every 6 hours PRN Moderate Pain (4 - 6)  ondansetron Injectable 4milliGRAM(s) IV Push every 6 hours PRN Nausea and/or Vomiting  bisacodyl 5milliGRAM(s) Oral daily PRN Constipation  senna 2Tablet(s) Oral at bedtime PRN Constipation      Allergies    No Known Allergies    Intolerances        SOCIAL HISTORY:    FAMILY HISTORY:  No pertinent family history: No significant family history      Vital Signs Last 24 Hrs  T(C): 35.6, Max: 37.1 ( @ 22:20)  T(F): 96.1, Max: 98.8 ( @ 22:20)  HR: 78 (74 - 90)  BP: 149/76 (149/76 - 151/85)  BP(mean): 100 (100 - 110)  RR: 15 (11 - 30)  SpO2: 100% (96% - 100%)     Physical Exam:  · Constitutional	detailed exam  · Constitutional Details	well-developed; well-groomed  · Eyes	EOMI; PERRL; no drainage or redness  · ENMT Comments	dry mucous membranes  · Respiratory	detailed exam  · Respiratory Comments	normal breath sounds at the lung bases bilaterally  · Cardiovascular	Regular rate & rhythm, normal S1, S2; no murmurs, gallops or rubs; no S3, S4  · Abd-Soft non tender  ·Ext-no edema, clubbing or cyanosis      LABS:                        9.4    8.2   )-----------( 156      ( 2017 06:15 )             29.0     06-02    139  |  107  |  9   ----------------------------<  94  4.1   |  21<L>  |  0.50    Ca    7.5<L>      2017 06:15  Phos  2.6     06-02  Mg     1.9     06-02    TPro  6.5  /  Alb  3.7  /  TBili  <0.2  /  DBili  x   /  AST  20  /  ALT  11  /  AlkPhos  56  05-31    PT/INR - ( 2017 13:26 )   PT: 12.1 sec;   INR: 1.09          PTT - ( 2017 13:26 )  PTT:51.6 sec      RADIOLOGY & ADDITIONAL STUDIES:

## 2017-06-02 NOTE — PROGRESS NOTE ADULT - ATTENDING COMMENTS
PLAN:   NEURO: neurochecks q1h, PRN pain meds with Tylenol, no opiates / benzos while lethargic  s/p bypass:  continue ASA daily, f/u aspirin assay, may need swithc to plavix; cont heparin gtt; minocycline x at least 4 days  epilepsy: continue oxcarbazepine 600mg BID  SG TASHI - keep for now, monitor output  REHAB:  no physical therapy for now    EARLY MOB:  HOB 20 degrees; bedrest for now until cleared by neurosurgery    PULM:  Room air, incentive spirometry  CARDIO:  SBP goal 140-160 mm Hg; continue ranexa, off ISMN and cardizem for now - may give nitrates PRN; continue high dose statin; ticagrelor on hold for now; consult cardio  ENDO:  Blood sugar goals 140-180 mg/dL, continue insulin sliding scale  GI:  docusate senna  DIET: NPO  RENAL:  NS@100cc/hr  HEM/ONC: Hb stable; PTT goal 40-53 - heparin gtt per neuro protocol  VTE Prophylaxis: SCDs, on heparin drip  ID: afebrile, no leukocytosis  Social: family at bedside, updated    Patient at high risk for neurological deterioration or death due to:  stroke, intracranial bleed, delirium, hyperperfusion injury, seizures.  Critical care time, excluding procedures: 45 minutes. PLAN:   NEURO: neurochecks q1h, PRN pain meds with Tylenol, no opiates / benzos while lethargic  s/p bypass:  off ASA, cont ticagrelor; cont heparin gtt; minocycline x at least 4 days  epilepsy: continue oxcarbazepine 600mg BID  SG TASHI - keep for now, monitor output  REHAB:  no physical therapy for now    EARLY MOB:  HOB 20 degrees; OOB to chair if ok with NS    PULM:  Room air, incentive spirometry  CARDIO:  SBP goal 140-160 mm Hg; continue ranexa, restar ISMN 15 BID, off cardizem for now; cont nitrates PRN; continue high dose statin; cardio on board  ENDO:  Blood sugar goals 140-180 mg/dL, continue insulin sliding scale  GI:  docusate senna  DIET: regular diet  RENAL:  decrease fluids to 50cc/hr; f/d fluids  HEM/ONC: Hb stable; PTT goal 40-53 - heparin gtt per neuro protocol until tomorrow  VTE Prophylaxis: SCDs, on heparin drip  ID: afebrile, no leukocytosis  Social: familiy updated yesterday    Patient at high risk for neurological deterioration or death due to:  stroke, intracranial bleed, delirium, hyperperfusion injury, seizures.  Critical care time, excluding procedures: 45 minutes.

## 2017-06-02 NOTE — PROGRESS NOTE ADULT - SUBJECTIVE AND OBJECTIVE BOX
Neurology Follow up note    Name  STEPHANE NICOLE    HPI:  32 yo woman with extensive medical history including HTN, dyslipidemia, CAD vs coronary vasospasm S/P PTCA x 3, who recently presented with L hemispheric TIAs (intermittent speech difficulties) as well as H/A and seizures. Vascular imaging (MRA, DSA) revealed bilateral ICA occlusion with multiple sources of collateral flow to bilateral hemispheres (mostly PCA on R side, PCA and transdural MMA collaterals on L side), consistent with severe bilateral moyamoya. Perfusion imaging (SPECT, NOVA) showed asymmetric flow favoring R side, which is worse after acetazolamide challenge. Bilateral cerebral revascularization was thus indicated, starting with the more symptomatic L side. Patient is being admitted electively for L direct-indirect EC-IC bypass. (31 May 2017 15:12)      Interval History - no new focal deficits- no headaches or seizures        REVIEW OF SYSTEMS    Vital Signs Last 24 Hrs  T(C): 35.6, Max: 37.3 (06-01 @ 13:29)  T(F): 96.1, Max: 99.1 (06-01 @ 13:29)  HR: 88 (70 - 96)  BP: 145/82 (145/82 - 145/82)  BP(mean): 108 (108 - 108)  RR: 30 (10 - 30)  SpO2: 98% (96% - 100%)    Physical Exam-     Mental Status- awake and alert    Cranial Nerves-nl    Gait and station-n/a    Motor-nl    Reflexes-nl    Sensation-nl    Coordination- no tremors    Vascular -    Medications  acetaminophen   Tablet. 325milliGRAM(s) Oral every 4 hours PRN  acetaminophen   Tablet. 650milliGRAM(s) Oral every 6 hours PRN  ondansetron Injectable 4milliGRAM(s) IV Push every 6 hours PRN  docusate sodium 100milliGRAM(s) Oral three times a day  bisacodyl 5milliGRAM(s) Oral daily PRN  senna 2Tablet(s) Oral at bedtime PRN  insulin lispro (HumaLOG) corrective regimen sliding scale  SubCutaneous Before meals and at bedtime  minocycline 100milliGRAM(s) Oral two times a day  atorvastatin 80milliGRAM(s) Oral at bedtime  OXcarbazepine 600milliGRAM(s) Oral two times a day  ranolazine 1000milliGRAM(s) Oral two times a day  ticagrelor 60milliGRAM(s) Oral two times a day  heparin  Infusion 900Unit(s)/Hr IV Continuous <Continuous>  sodium chloride 0.9%. 1000milliLiter(s) IV Continuous <Continuous>  isosorbide   mononitrate ER Tablet (IMDUR) 30milliGRAM(s) Oral daily      Lab      Radiology    Assessment - Ana Perez    Plan as per NS

## 2017-06-03 LAB
ANION GAP SERPL CALC-SCNC: 11 MMOL/L — SIGNIFICANT CHANGE UP (ref 5–17)
APTT BLD: 39.7 SEC — HIGH (ref 27.5–37.4)
APTT BLD: 57.4 SEC — HIGH (ref 27.5–37.4)
BUN SERPL-MCNC: 10 MG/DL — SIGNIFICANT CHANGE UP (ref 7–23)
CALCIUM SERPL-MCNC: 8.4 MG/DL — SIGNIFICANT CHANGE UP (ref 8.4–10.5)
CHLORIDE SERPL-SCNC: 103 MMOL/L — SIGNIFICANT CHANGE UP (ref 96–108)
CO2 SERPL-SCNC: 23 MMOL/L — SIGNIFICANT CHANGE UP (ref 22–31)
CREAT SERPL-MCNC: 0.6 MG/DL — SIGNIFICANT CHANGE UP (ref 0.5–1.3)
GLUCOSE SERPL-MCNC: 81 MG/DL — SIGNIFICANT CHANGE UP (ref 70–99)
HCT VFR BLD CALC: 31.2 % — LOW (ref 34.5–45)
HGB BLD-MCNC: 10.1 G/DL — LOW (ref 11.5–15.5)
MAGNESIUM SERPL-MCNC: 1.7 MG/DL — SIGNIFICANT CHANGE UP (ref 1.6–2.6)
MCHC RBC-ENTMCNC: 27.4 PG — SIGNIFICANT CHANGE UP (ref 27–34)
MCHC RBC-ENTMCNC: 32.4 G/DL — SIGNIFICANT CHANGE UP (ref 32–36)
MCV RBC AUTO: 84.6 FL — SIGNIFICANT CHANGE UP (ref 80–100)
PHOSPHATE SERPL-MCNC: 3.2 MG/DL — SIGNIFICANT CHANGE UP (ref 2.5–4.5)
PLATELET # BLD AUTO: 162 K/UL — SIGNIFICANT CHANGE UP (ref 150–400)
POTASSIUM SERPL-MCNC: 3.9 MMOL/L — SIGNIFICANT CHANGE UP (ref 3.5–5.3)
POTASSIUM SERPL-SCNC: 3.9 MMOL/L — SIGNIFICANT CHANGE UP (ref 3.5–5.3)
RBC # BLD: 3.69 M/UL — LOW (ref 3.8–5.2)
RBC # FLD: 17 % — HIGH (ref 10.3–16.9)
SODIUM SERPL-SCNC: 137 MMOL/L — SIGNIFICANT CHANGE UP (ref 135–145)
WBC # BLD: 6.7 K/UL — SIGNIFICANT CHANGE UP (ref 3.8–10.5)
WBC # FLD AUTO: 6.7 K/UL — SIGNIFICANT CHANGE UP (ref 3.8–10.5)

## 2017-06-03 PROCEDURE — 99233 SBSQ HOSP IP/OBS HIGH 50: CPT

## 2017-06-03 PROCEDURE — 99291 CRITICAL CARE FIRST HOUR: CPT | Mod: 24

## 2017-06-03 RX ORDER — HEPARIN SODIUM 5000 [USP'U]/ML
900 INJECTION INTRAVENOUS; SUBCUTANEOUS
Qty: 25000 | Refills: 0 | Status: DISCONTINUED | OUTPATIENT
Start: 2017-06-03 | End: 2017-06-03

## 2017-06-03 RX ORDER — ENOXAPARIN SODIUM 100 MG/ML
60 INJECTION SUBCUTANEOUS DAILY
Qty: 0 | Refills: 0 | Status: DISCONTINUED | OUTPATIENT
Start: 2017-06-03 | End: 2017-06-03

## 2017-06-03 RX ORDER — ENOXAPARIN SODIUM 100 MG/ML
60 INJECTION SUBCUTANEOUS EVERY 12 HOURS
Qty: 0 | Refills: 0 | Status: DISCONTINUED | OUTPATIENT
Start: 2017-06-03 | End: 2017-06-05

## 2017-06-03 RX ORDER — MAGNESIUM OXIDE 400 MG ORAL TABLET 241.3 MG
400 TABLET ORAL ONCE
Qty: 0 | Refills: 0 | Status: COMPLETED | OUTPATIENT
Start: 2017-06-03 | End: 2017-06-03

## 2017-06-03 RX ORDER — NITROGLYCERIN 6.5 MG
0.4 CAPSULE, EXTENDED RELEASE ORAL ONCE
Qty: 0 | Refills: 0 | Status: COMPLETED | OUTPATIENT
Start: 2017-06-03 | End: 2017-06-03

## 2017-06-03 RX ORDER — ENOXAPARIN SODIUM 100 MG/ML
60 INJECTION SUBCUTANEOUS EVERY 12 HOURS
Qty: 0 | Refills: 0 | Status: DISCONTINUED | OUTPATIENT
Start: 2017-06-03 | End: 2017-06-03

## 2017-06-03 RX ADMIN — Medication 650 MILLIGRAM(S): at 10:41

## 2017-06-03 RX ADMIN — MINOCYCLINE HYDROCHLORIDE 100 MILLIGRAM(S): 45 TABLET, EXTENDED RELEASE ORAL at 17:47

## 2017-06-03 RX ADMIN — MINOCYCLINE HYDROCHLORIDE 100 MILLIGRAM(S): 45 TABLET, EXTENDED RELEASE ORAL at 05:59

## 2017-06-03 RX ADMIN — ISOSORBIDE MONONITRATE 30 MILLIGRAM(S): 60 TABLET, EXTENDED RELEASE ORAL at 11:35

## 2017-06-03 RX ADMIN — Medication 100 MILLIGRAM(S): at 21:43

## 2017-06-03 RX ADMIN — RANOLAZINE 1000 MILLIGRAM(S): 500 TABLET, FILM COATED, EXTENDED RELEASE ORAL at 17:48

## 2017-06-03 RX ADMIN — Medication 1000 MILLIGRAM(S): at 21:50

## 2017-06-03 RX ADMIN — Medication 0.4 MILLIGRAM(S): at 07:57

## 2017-06-03 RX ADMIN — Medication 100 MILLIGRAM(S): at 13:18

## 2017-06-03 RX ADMIN — OXCARBAZEPINE 600 MILLIGRAM(S): 300 TABLET, FILM COATED ORAL at 17:48

## 2017-06-03 RX ADMIN — OXCARBAZEPINE 600 MILLIGRAM(S): 300 TABLET, FILM COATED ORAL at 05:59

## 2017-06-03 RX ADMIN — Medication 100 MILLIGRAM(S): at 05:59

## 2017-06-03 RX ADMIN — TICAGRELOR 60 MILLIGRAM(S): 90 TABLET ORAL at 17:47

## 2017-06-03 RX ADMIN — Medication 400 MILLIGRAM(S): at 21:00

## 2017-06-03 RX ADMIN — Medication 325 MILLIGRAM(S): at 07:07

## 2017-06-03 RX ADMIN — RANOLAZINE 1000 MILLIGRAM(S): 500 TABLET, FILM COATED, EXTENDED RELEASE ORAL at 06:00

## 2017-06-03 RX ADMIN — Medication 325 MILLIGRAM(S): at 06:23

## 2017-06-03 RX ADMIN — ATORVASTATIN CALCIUM 80 MILLIGRAM(S): 80 TABLET, FILM COATED ORAL at 21:43

## 2017-06-03 RX ADMIN — TICAGRELOR 60 MILLIGRAM(S): 90 TABLET ORAL at 06:00

## 2017-06-03 RX ADMIN — MAGNESIUM OXIDE 400 MG ORAL TABLET 400 MILLIGRAM(S): 241.3 TABLET ORAL at 10:38

## 2017-06-03 RX ADMIN — ENOXAPARIN SODIUM 60 MILLIGRAM(S): 100 INJECTION SUBCUTANEOUS at 12:51

## 2017-06-03 NOTE — PROGRESS NOTE ADULT - SUBJECTIVE AND OBJECTIVE BOX
Covering for Dr. Bennett.    INTERVAL HISTORY: Pt reporting mild chest discomfort last night, improved with pain medications. Otherwise, denies SOB, palpitations.   	  MEDICATIONS:  ranolazine 1000milliGRAM(s) Oral two times a day  ticagrelor 60milliGRAM(s) Oral two times a day  isosorbide   mononitrate ER Tablet (IMDUR) 30milliGRAM(s) Oral daily  enoxaparin Injectable 60milliGRAM(s) SubCutaneous every 12 hours    minocycline 100milliGRAM(s) Oral two times a day      acetaminophen   Tablet. 325milliGRAM(s) Oral every 4 hours PRN  acetaminophen   Tablet. 650milliGRAM(s) Oral every 6 hours PRN  ondansetron Injectable 4milliGRAM(s) IV Push every 6 hours PRN  OXcarbazepine 600milliGRAM(s) Oral two times a day  acetaminophen  IVPB. 1000milliGRAM(s) IV Intermittent once    docusate sodium 100milliGRAM(s) Oral three times a day  bisacodyl 5milliGRAM(s) Oral daily PRN  senna 2Tablet(s) Oral at bedtime PRN    insulin lispro (HumaLOG) corrective regimen sliding scale  SubCutaneous Before meals and at bedtime  atorvastatin 80milliGRAM(s) Oral at bedtime        PHYSICAL EXAM:  T(C): 37.2, Max: 37.3 (06-02 @ 18:01)  HR: 92 (72 - 96)  BP: 130/90 (122/75 - 155/85)  RR: 28 (15 - 28)  SpO2: 99% (94% - 100%)  Wt(kg): --  I&O's Summary  I & Os for 24h ending 03 Jun 2017 07:00  =============================================  IN: 1404.5 ml / OUT: 3340 ml / NET: -1935.5 ml    I & Os for current day (as of 03 Jun 2017 10:22)  =============================================  IN: 50 ml / OUT: 10 ml / NET: 40 ml        Appearance: NAD  Cardiovascular: Normal S1 S2, No JVD, No murmurs, No edema  Respiratory: Lungs clear to auscultation	  Gastrointestinal:  Soft, Non-tender, + BS	  Neurologic: Non-focal  Extremities: Normal range of motion, No clubbing, cyanosis or edema  Vascular: Peripheral pulses palpable 2+ bilaterally      LABS:	 	             10.1   6.7   )-----------( 162      ( 03 Jun 2017 05:59 )             31.2     06-03    137  |  103  |  10  ----------------------------<  81  3.9   |  23  |  0.60    Ca    8.4      03 Jun 2017 05:59  Phos  3.2     06-03  Mg     1.7     06-03    ASSESSMENT/PLAN: 	  30 yo F with Ana johnson, seizure d/o, CAD s/p PCI x 3 to pLAD (last one in 3/2016), normal LVEF, chronic angina 2/2 coronary spasms and jailed small LCx by LAD stent going into distal left main, s/p combined direct /indirect L EC-IC bypass on 5/31/17.   1. Restart Cardizem when BP parameters allow. Now back on Imdur ER - home dose 60 mg qd - titrate up when BP parameters allow.    2. Brillinta at 60 mg q12h (does not need to be on 90 q12h since it has been more than 1 yr since her last PCI). From cardiac perspective, can safely d/c LMWH once Brillinta has been in the system for 48 hrs. ? role of even low dose ASA in addition to Brillinta since she is ASA resistant.      3. Care per primary team

## 2017-06-03 NOTE — PROGRESS NOTE ADULT - SUBJECTIVE AND OBJECTIVE BOX
HPI:  32 yo woman with extensive medical history including HTN, dyslipidemia, CAD vs coronary vasospasm S/P PTCA x 3, who recently presented with L hemispheric TIAs (intermittent speech difficulties) as well as H/A and seizures. Vascular imaging (MRA, DSA) revealed bilateral ICA occlusion with multiple sources of collateral flow to bilateral hemispheres (mostly PCA on R side, PCA and transdural MMA collaterals on L side), consistent with severe bilateral moyamoya. Perfusion imaging (SPECT, NOVA) showed asymmetric flow favoring R side, which is worse after acetazolamide challenge. Bilateral cerebral revascularization was thus indicated, starting with the more symptomatic L side. Patient is being admitted electively for L direct-indirect EC-IC bypass. (31 May 2017 15:12)    OVERNIGHT EVENTS: Patient reports some facial swelling and left side incisional pain minimally improved with Tylenol. Poor PO appetite.  40cc from TASHI drain over 12 hour period.    Vital Signs Last 24 Hrs  T(C): 36.6, Max: 37.3 (06-02 @ 18:01)  T(F): 97.9, Max: 99.1 (06-02 @ 18:01)  HR: 72 (72 - 96)  BP: 135/87 (122/75 - 155/85)  BP(mean): 103 (91 - 118)  RR: 16 (15 - 30)  SpO2: 98% (94% - 100%)    I&O's Detail  I & Os for 24h ending 03 Jun 2017 07:00  =============================================  IN:    sodium chloride 0.9%.: 1100 ml    heparin Infusion: 108 ml    sodium chloride 0.9%: 100 ml    heparin Infusion: 45 ml    heparin Infusion: 42.5 ml    heparin Infusion: 9 ml    Total IN: 1404.5 ml  ---------------------------------------------  OUT:    Voided: 2950 ml    Indwelling Catheter - Urethral: 350 ml    Bulb: 40 ml    Total OUT: 3340 ml  ---------------------------------------------  Total NET: -1935.5 ml    I & Os for current day (as of 03 Jun 2017 07:16)  =============================================  IN:    sodium chloride 0.9%.: 50 ml    Total IN: 50 ml  ---------------------------------------------  OUT:    Total OUT: 0 ml  ---------------------------------------------  Total NET: 50 ml    I&O's Summary  I & Os for 24h ending 03 Jun 2017 07:00  =============================================  IN: 1404.5 ml / OUT: 3340 ml / NET: -1935.5 ml    I & Os for current day (as of 03 Jun 2017 07:16)  =============================================  IN: 50 ml / OUT: 0 ml / NET: 50 ml      PHYSICAL EXAM:  Gen: NAD, AAOx3 in Danish  HEENT: PERRL. EOMI. Left scalp incision C/D/I w/ staple dressing, +TASHI  Neck: FROM, supple.  Lungs: Clear b/l.  Heart: S1, S2. NSR.  Abd: Soft, NT/ND. +BS  Exts: 2+ throughout, right groin C/D/I.  Neuro: CNs II-XII intact. PARTER x4 w/ good str - 4+/5 in UEs effort limited, 5/5 in LEs. Sensation to LT intact throughout. Following commands.    TUBES/LINES:  [] CVC  [] A-line  [] Lumbar Drain  [] Ventriculostomy  [x] Other - TASHI    DIET:  [] NPO  [x] Mechanical  [] Tube feeds    LABS:                        10.1   6.7   )-----------( 162      ( 03 Jun 2017 05:59 )             31.2     06-03    137  |  103  |  10  ----------------------------<  81  3.9   |  23  |  0.60    Ca    8.4      03 Jun 2017 05:59  Phos  3.2     06-03  Mg     1.7     06-03      PT/INR - ( 02 Jun 2017 13:26 )   PT: 12.1 sec;   INR: 1.09          PTT - ( 03 Jun 2017 05:59 )  PTT:57.4 sec        CAPILLARY BLOOD GLUCOSE  81 (03 Jun 2017 06:00)  116 (03 Jun 2017 00:00)      Drug Levels: [] N/A    CSF Analysis: [] N/A      Allergies    No Known Allergies    Intolerances      MEDICATIONS:  Antibiotics:  minocycline 100milliGRAM(s) Oral two times a day    Neuro:  acetaminophen   Tablet. 325milliGRAM(s) Oral every 4 hours PRN  acetaminophen   Tablet. 650milliGRAM(s) Oral every 6 hours PRN  ondansetron Injectable 4milliGRAM(s) IV Push every 6 hours PRN  OXcarbazepine 600milliGRAM(s) Oral two times a day  acetaminophen  IVPB. 1000milliGRAM(s) IV Intermittent once    Anticoagulation:  ticagrelor 60milliGRAM(s) Oral two times a day    OTHER:  docusate sodium 100milliGRAM(s) Oral three times a day  bisacodyl 5milliGRAM(s) Oral daily PRN  senna 2Tablet(s) Oral at bedtime PRN  insulin lispro (HumaLOG) corrective regimen sliding scale  SubCutaneous Before meals and at bedtime  atorvastatin 80milliGRAM(s) Oral at bedtime  ranolazine 1000milliGRAM(s) Oral two times a day  isosorbide   mononitrate ER Tablet (IMDUR) 30milliGRAM(s) Oral daily    IVF:  sodium chloride 0.9%. 1000milliLiter(s) IV Continuous <Continuous>    CULTURES:    RADIOLOGY & ADDITIONAL TESTS:      ASSESSMENT:  31y Female s/p left STA-MCA bypass POD#3, s/p Cerebral Angiogram POD#2 with PMH of HLD, CAD s/p stents, TIAs, Perez-Perez Disease.    PLAN:  NEURO: Continue Neuro checks,  Plan to DC TASHI drain today, Heparin gtt DCed @6am  Continue home Trileptal, Minocycline, Tylenol PRN pain  MR Nova/CT Head done, reviewed    CARDIOVASCULAR: SBP goal 100-160, on home meds  AM labs, electrolyte repletion,  Continue Brillanta, plan for full AC Lovenox today    PULMONARY: Incentive spirometry, satting well on RA    RENAL: Voiding    GI: PO diet, stool softeners, NS@50cc/hr for poor appetite    HEME: Hep gtt stopped, plan for full AC today, SCDs    ID: Afebrile    ENDO: ISS    DISPOSITION: Will consider SDU, cont current care  D/w Dr. Long, will d/w Dr. Rubio

## 2017-06-03 NOTE — PROGRESS NOTE ADULT - SUBJECTIVE AND OBJECTIVE BOX
=================================  NEUROCRITICAL CARE ATTENDING NOTE  =================================    STEPHANE NICOLE   MRN-[id number]  Summary:  31F with Hypertension, dyslipidemia, CAD, s/p stents x3, presented with intermittent speech difficulties, HA, seizures, diagnosed with TIAs.  Imaging revealed bilatearl ICA occlusion, Moyamoya pattern.  SPECT / NOVA showed asymmetric flow favoring R, with worsening perfusion on acetazolamide.  Admitted  for elective L direct and indirect bypass; bypass clotted in the OR - given extra doses of ASA, started on heparin drip  Overnight Events: no significant events overnight    PAST MEDICAL & SURGICAL HISTORY: TIA (transient ischemic attack): x 4 Moyamoya disease Dyslipidemia CAD (coronary artery disease): Hx Cardiac stent which was re occluded then open Essential hypertension: Hypertension Epilepsy: Epilepsy Coronary stent occlusion Other postprocedural status: S/P   NKDA  Home meds: NTG PRN, ISMN 30mg daily ticagrelor 90mg BID atorvastatin 80mg HS  daily ranexa 1g ER BID cardizem CD daily oxcarbazepine 600mg BID    PHYSICAL EXAMINATION  T(C): , Max: 37.3 (06-02 @ 18:01) HR: 72 (72 - 96) BP: 135/87 (122/75 - 155/85) RR: 16 (15 - 30) SpO2: 98% (94% - 100%)  NEUROLOGIC EXAMINATION:  Patient is awake, alert, fully oriented pupils 3mm equal and briskly reactive to light, EOMs intact, 5/5 all 4s  GENERAL:  not intubated, not in cardiorespiratory distress  EENT: anicteric  CARDIOVASC:  (+) S1 S2, normal rate and regular rhythm  PULMONARY:  clear to auscultation bilaterally  ABDOMEN:  soft, nontender, with normoactive bowel sounds  EXTREMITIES:  no edema  SKIN:  no rash    LABS:  CAPILLARY BLOOD GLUCOSE 81 (2017 06:00) 116 (2017 00:00)                        10.1   6.7   )-----------( 162      ( 2017 05:59 )             31.2     137  |  103  |  10  ----------------------------<  81  3.9   |  23  |  0.60    Ca    8.4      2017 05:59  Phos  3.2       Mg     1.7         I & Os for 24h ending  @ 07:00  IN: 1404.5 ml / OUT: 3340 ml / NET: -1935.5 ml    PT/INR - ( 2017 13:26 )   PT: 12.1 sec;   INR: 1.09    PTT - ( 2017 05:59 )  PTT:57.4 sec    Bacteriology:  CSF studies:  EEG:  Neuroimagin/02 CT head: s/p bypass, postsurgical changes, all focal area of acute/subacute ischemia within L frontal lobe;  CT  post-op changes  Other imagin/01 CXR: no acute pathology    MEDICATIONS: docusate bisacodyl senna mod ISS minocycline 100mg BID atorvastatin 80mg HS oxcarbazepine 600mg BID ranolazine 1g BID ticagrelor 60mg BID ISMN 30 dailyi    IV FLUIDS: NS@100cc/hr  DRIPS: heparin gtt  DIET: NPO  Lines / Drains: Lexie Rosa SG JP (output:170/24h)    CODE STATUS:  full code                       GOALS OF CARE:  aggressive                      DISPOSITION:  ICU =================================  NEUROCRITICAL CARE ATTENDING NOTE  =================================    STEPHANE NICOLE   MRN-[id number]  Summary:  31F with Hypertension, dyslipidemia, CAD, s/p stents x3, presented with intermittent speech difficulties, HA, seizures, diagnosed with TIAs.  Imaging revealed bilatearl ICA occlusion, Moyamoya pattern.  SPECT / NOVA showed asymmetric flow favoring R, with worsening perfusion on acetazolamide.  Admitted  for elective L direct and indirect bypass; bypass clotted in the OR - given extra doses of ASA, started on heparin drip  Overnight Events: complaining of generalized weakness, complains of CP this morning - given NTG    PAST MEDICAL & SURGICAL HISTORY: TIA (transient ischemic attack): x 4 Moyamoya disease Dyslipidemia CAD (coronary artery disease): Hx Cardiac stent which was re occluded then open Essential hypertension: Hypertension Epilepsy: Epilepsy Coronary stent occlusion Other postprocedural status: S/P   NKDA  Home meds: NTG PRN, ISMN 30mg daily ticagrelor 90mg BID atorvastatin 80mg HS  daily ranexa 1g ER BID cardizem CD daily oxcarbazepine 600mg BID    PHYSICAL EXAMINATION  T(C): , Max: 37.3 (06-02 @ 18:01) HR: 72 (72 - 96) BP: 135/87 (122/75 - 155/85) RR: 16 (15 - 30) SpO2: 98% (94% - 100%)  NEUROLOGIC EXAMINATION:  Patient is awake, alert, fully oriented pupils 3mm equal and briskly reactive to light, EOMs intact, moving all 4s 5/5  GENERAL:  not intubated, not in cardiorespiratory distress  EENT: anicteric, periorbital edema / ecchymosis  CARDIOVASC:  (+) S1 S2, normal rate and regular rhythm  PULMONARY:  clear to auscultation bilaterally  ABDOMEN:  soft, nontender, with normoactive bowel sounds  EXTREMITIES:  no edema  SKIN:  no rash    LABS:  CAPILLARY BLOOD GLUCOSE 81 (2017 06:00) 116 (2017 00:00)                        10.1   6.7   )-----------( 162      ( 2017 05:59 )             31.2     137  |  103  |  10  ----------------------------<  81  3.9   |  23  |  0.60    Ca    8.4      2017 05:59  Phos  3.2       Mg     1.7         I & Os for 24h ending  @ 07:00  IN: 1404.5 ml / OUT: 3340 ml / NET: -1935.5 ml    PT/INR - ( 2017 13:26 )   PT: 12.1 sec;   INR: 1.09    PTT - ( 2017 05:59 )  PTT:57.4 sec    Bacteriology:  CSF studies:  EEG:  Neuroimagin/02 CT head: s/p bypass, postsurgical changes, all focal area of acute/subacute ischemia within L frontal lobe;  CT  post-op changes  Other imagin/01 CXR: no acute pathology    MEDICATIONS: docusate bisacodyl senna mod ISS minocycline 100mg BID atorvastatin 80mg HS oxcarbazepine 600mg BID ranolazine 1g BID ticagrelor 60mg BID ISMN 30 daily    IV FLUIDS: NS@50  DRIPS: heparin gtt - off this morning  DIET: regular diet  Lines / Drains: SG TASHI (output:50/24h)    CODE STATUS:  full code                       GOALS OF CARE:  aggressive                      DISPOSITION:  ICU

## 2017-06-03 NOTE — PROCEDURE NOTE - GENERAL PROCEDURE DETAILS
Patient's left side scalp dressing removed, drain site cleaned with chloroprep. Rochelle suture was cut and drain was removed in whole. One staple placed to close drain site. Patient reported significant pain and was very emotional but otherwise tolerated procedure well.

## 2017-06-03 NOTE — PROVIDER CONTACT NOTE (OTHER) - SITUATION
Notified FAIZAN Pulido of pt. complaining of chest pain.
Notified Neuro FAIZAN Rutherford regarding pt. SBP in the 120s despite boluses. MD Long made aware and verbalized to just monitor.
pt c/o 4/10 chest pain. VSS. EKG completed. pt requesting nitroglycerin. neuro status wnl.

## 2017-06-03 NOTE — PROGRESS NOTE ADULT - ATTENDING COMMENTS
PLAN:   NEURO: neurochecks q1h, PRN pain meds with Tylenol, no opiates / benzos while lethargic  s/p bypass:  off ASA, cont ticagrelor; cont heparin gtt; minocycline x at least 4 days  epilepsy: continue oxcarbazepine 600mg BID  SG TASHI - keep for now, monitor output  REHAB:  no physical therapy for now    EARLY MOB:  HOB 20 degrees; OOB to chair if ok with NS    PULM:  Room air, incentive spirometry  CARDIO:  SBP goal 140-160 mm Hg; continue ranexa, restar ISMN 15 BID, off cardizem for now; cont nitrates PRN; continue high dose statin; cardio on board  ENDO:  Blood sugar goals 140-180 mg/dL, continue insulin sliding scale  GI:  docusate senna  DIET: regular diet  RENAL:  decrease fluids to 50cc/hr; f/d fluids  HEM/ONC: Hb stable; PTT goal 40-53 - heparin gtt per neuro protocol until tomorrow  VTE Prophylaxis: SCDs, on heparin drip  ID: afebrile, no leukocytosis  Social: familiy updated yesterday    Patient at high risk for neurological deterioration or death due to:  stroke, intracranial bleed, delirium, hyperperfusion injury, seizures.  Critical care time, excluding procedures: 45 minutes. PLAN:   NEURO: neurochecks q1h, PRN pain meds with Tylenol, no opiates / benzos while lethargic  s/p bypass:  cont ticagrelor; start lovenox; minocycline x at least 4 days  epilepsy: continue oxcarbazepine 600mg BID  SG TASHI - d/c   REHAB:  no physical therapy for now    EARLY MOB:  HOB 20 degrees; OOB to chair if ok with NS    PULM:  Room air, incentive spirometry  CARDIO:  SBP goal 140-160 mm Hg; continue ranexa, cont ISMN to home dose; off cardizem for now; cont nitrates PRN; continue high dose statin; cardio on board  ENDO:  Blood sugar goals 140-180 mg/dL, continue insulin sliding scale  GI:  docusate senna  DIET: regular diet  RENAL:  d/c fluids  HEM/ONC: Hb stable  VTE Prophylaxis: SCDs, off heparin drip - start lovenox - confirm dose with Dr. Russo  ID: afebrile, no leukocytosis  Social: familiy updated yesterday    Patient at high risk for neurological deterioration or death due to:  stroke, intracranial bleed, delirium, hyperperfusion injury, seizures.  Critical care time, excluding procedures: 45 minutes.

## 2017-06-04 DIAGNOSIS — I25.118 ATHEROSCLEROTIC HEART DISEASE OF NATIVE CORONARY ARTERY WITH OTHER FORMS OF ANGINA PECTORIS: ICD-10-CM

## 2017-06-04 LAB
ANION GAP SERPL CALC-SCNC: 10 MMOL/L — SIGNIFICANT CHANGE UP (ref 5–17)
BUN SERPL-MCNC: 11 MG/DL — SIGNIFICANT CHANGE UP (ref 7–23)
CALCIUM SERPL-MCNC: 8.4 MG/DL — SIGNIFICANT CHANGE UP (ref 8.4–10.5)
CHLORIDE SERPL-SCNC: 100 MMOL/L — SIGNIFICANT CHANGE UP (ref 96–108)
CK MB CFR SERPL CALC: 2 NG/ML — SIGNIFICANT CHANGE UP (ref 0–6.7)
CK SERPL-CCNC: 252 U/L — HIGH (ref 25–170)
CO2 SERPL-SCNC: 25 MMOL/L — SIGNIFICANT CHANGE UP (ref 22–31)
CREAT SERPL-MCNC: 0.7 MG/DL — SIGNIFICANT CHANGE UP (ref 0.5–1.3)
CRP SERPL-MCNC: 4.1 MG/DL — HIGH (ref 0–0.4)
ERYTHROCYTE [SEDIMENTATION RATE] IN BLOOD: 35 MM/HR — HIGH
GLUCOSE SERPL-MCNC: 91 MG/DL — SIGNIFICANT CHANGE UP (ref 70–99)
HCT VFR BLD CALC: 33 % — LOW (ref 34.5–45)
HGB BLD-MCNC: 11.2 G/DL — LOW (ref 11.5–15.5)
MAGNESIUM SERPL-MCNC: 1.7 MG/DL — SIGNIFICANT CHANGE UP (ref 1.6–2.6)
MCHC RBC-ENTMCNC: 28.5 PG — SIGNIFICANT CHANGE UP (ref 27–34)
MCHC RBC-ENTMCNC: 33.9 G/DL — SIGNIFICANT CHANGE UP (ref 32–36)
MCV RBC AUTO: 84 FL — SIGNIFICANT CHANGE UP (ref 80–100)
PHOSPHATE SERPL-MCNC: 4.3 MG/DL — SIGNIFICANT CHANGE UP (ref 2.5–4.5)
PLATELET # BLD AUTO: 203 K/UL — SIGNIFICANT CHANGE UP (ref 150–400)
POTASSIUM SERPL-MCNC: 4.2 MMOL/L — SIGNIFICANT CHANGE UP (ref 3.5–5.3)
POTASSIUM SERPL-SCNC: 4.2 MMOL/L — SIGNIFICANT CHANGE UP (ref 3.5–5.3)
PROT SERPL-MCNC: 5.9 G/DL — LOW (ref 6–8.3)
PROT SERPL-MCNC: 5.9 G/DL — LOW (ref 6–8.3)
RBC # BLD: 3.93 M/UL — SIGNIFICANT CHANGE UP (ref 3.8–5.2)
RBC # FLD: 16.2 % — SIGNIFICANT CHANGE UP (ref 10.3–16.9)
SODIUM SERPL-SCNC: 135 MMOL/L — SIGNIFICANT CHANGE UP (ref 135–145)
TROPONIN T SERPL-MCNC: <0.01 NG/ML — SIGNIFICANT CHANGE UP (ref 0–0.01)
WBC # BLD: 5.1 K/UL — SIGNIFICANT CHANGE UP (ref 3.8–10.5)
WBC # FLD AUTO: 5.1 K/UL — SIGNIFICANT CHANGE UP (ref 3.8–10.5)

## 2017-06-04 PROCEDURE — 99232 SBSQ HOSP IP/OBS MODERATE 35: CPT

## 2017-06-04 PROCEDURE — 93010 ELECTROCARDIOGRAM REPORT: CPT

## 2017-06-04 RX ORDER — NITROGLYCERIN 6.5 MG
0.4 CAPSULE, EXTENDED RELEASE ORAL
Qty: 0 | Refills: 0 | Status: COMPLETED | OUTPATIENT
Start: 2017-06-04 | End: 2017-06-06

## 2017-06-04 RX ORDER — ISOSORBIDE MONONITRATE 60 MG/1
60 TABLET, EXTENDED RELEASE ORAL DAILY
Qty: 0 | Refills: 0 | Status: DISCONTINUED | OUTPATIENT
Start: 2017-06-05 | End: 2017-06-08

## 2017-06-04 RX ORDER — ISOSORBIDE MONONITRATE 60 MG/1
30 TABLET, EXTENDED RELEASE ORAL ONCE
Qty: 0 | Refills: 0 | Status: COMPLETED | OUTPATIENT
Start: 2017-06-04 | End: 2017-06-04

## 2017-06-04 RX ADMIN — Medication 30 MILLILITER(S): at 13:30

## 2017-06-04 RX ADMIN — MINOCYCLINE HYDROCHLORIDE 100 MILLIGRAM(S): 45 TABLET, EXTENDED RELEASE ORAL at 06:54

## 2017-06-04 RX ADMIN — ISOSORBIDE MONONITRATE 30 MILLIGRAM(S): 60 TABLET, EXTENDED RELEASE ORAL at 12:00

## 2017-06-04 RX ADMIN — SENNA PLUS 2 TABLET(S): 8.6 TABLET ORAL at 22:06

## 2017-06-04 RX ADMIN — Medication 650 MILLIGRAM(S): at 08:33

## 2017-06-04 RX ADMIN — MINOCYCLINE HYDROCHLORIDE 100 MILLIGRAM(S): 45 TABLET, EXTENDED RELEASE ORAL at 18:46

## 2017-06-04 RX ADMIN — TICAGRELOR 60 MILLIGRAM(S): 90 TABLET ORAL at 18:46

## 2017-06-04 RX ADMIN — Medication 100 MILLIGRAM(S): at 06:53

## 2017-06-04 RX ADMIN — Medication 100 MILLIGRAM(S): at 13:30

## 2017-06-04 RX ADMIN — ENOXAPARIN SODIUM 60 MILLIGRAM(S): 100 INJECTION SUBCUTANEOUS at 06:53

## 2017-06-04 RX ADMIN — Medication 0.4 MILLIGRAM(S): at 06:52

## 2017-06-04 RX ADMIN — ATORVASTATIN CALCIUM 80 MILLIGRAM(S): 80 TABLET, FILM COATED ORAL at 22:06

## 2017-06-04 RX ADMIN — OXCARBAZEPINE 600 MILLIGRAM(S): 300 TABLET, FILM COATED ORAL at 18:46

## 2017-06-04 RX ADMIN — Medication 650 MILLIGRAM(S): at 04:30

## 2017-06-04 RX ADMIN — OXCARBAZEPINE 600 MILLIGRAM(S): 300 TABLET, FILM COATED ORAL at 06:54

## 2017-06-04 RX ADMIN — Medication 650 MILLIGRAM(S): at 06:54

## 2017-06-04 RX ADMIN — ONDANSETRON 4 MILLIGRAM(S): 8 TABLET, FILM COATED ORAL at 13:33

## 2017-06-04 RX ADMIN — RANOLAZINE 1000 MILLIGRAM(S): 500 TABLET, FILM COATED, EXTENDED RELEASE ORAL at 06:54

## 2017-06-04 RX ADMIN — Medication 100 MILLIGRAM(S): at 22:06

## 2017-06-04 RX ADMIN — RANOLAZINE 1000 MILLIGRAM(S): 500 TABLET, FILM COATED, EXTENDED RELEASE ORAL at 18:46

## 2017-06-04 RX ADMIN — ISOSORBIDE MONONITRATE 30 MILLIGRAM(S): 60 TABLET, EXTENDED RELEASE ORAL at 18:45

## 2017-06-04 RX ADMIN — TICAGRELOR 60 MILLIGRAM(S): 90 TABLET ORAL at 06:54

## 2017-06-04 RX ADMIN — ENOXAPARIN SODIUM 60 MILLIGRAM(S): 100 INJECTION SUBCUTANEOUS at 18:46

## 2017-06-04 NOTE — PROGRESS NOTE ADULT - SUBJECTIVE AND OBJECTIVE BOX
Subjective: Neurosurgery PA note   S/P STA to MCA bypass for sandra sandra disease. Had chesst pain this morning which resolved with nitroglycerine sublinguak x 1. Patient expresses desire to go home today.   T(C): 36.6, Max: 37.2 (06-03 @ 10:01)  HR: 76 (72 - 776)  BP: 133/64 (120/62 - 168/93)  RR: 18 (16 - 24)  SpO2: 99% (96% - 100%)  Wt(kg): --    Exam: A&O x 3 PERRL, EOMI. No pronator drift  Scalp incision is dry and clean bilateral periorbital edema.  No focal deficit 5/5 x 4    CBC Full  -  ( 04 Jun 2017 05:33 )  WBC Count : 5.1 K/uL  Hemoglobin : 11.2 g/dL  Hematocrit : 33.0 %  Platelet Count - Automated : 203 K/uL  Mean Cell Volume : 84.0 fL  Mean Cell Hemoglobin : 28.5 pg  Mean Cell Hemoglobin Concentration : 33.9 g/dL  Auto Neutrophil # : x  Auto Lymphocyte # : x  Auto Monocyte # : x  Auto Eosinophil # : x  Auto Basophil # : x  Auto Neutrophil % : x  Auto Lymphocyte % : x  Auto Monocyte % : x  Auto Eosinophil % : x  Auto Basophil % : x    06-04    135  |  100  |  11  ----------------------------<  91  4.2   |  25  |  0.70    Ca    8.4      04 Jun 2017 05:33  Phos  4.3     06-04  Mg     1.7     06-04      PT/INR - ( 02 Jun 2017 13:26 )   PT: 12.1 sec;   INR: 1.09          PTT - ( 03 Jun 2017 05:59 )  PTT:57.4 sec      Wound: dry and clean as above    Imaging: MRA NOVA done    Assessment/Plan:31 female post L. STA to MCA bypass  CT head tomorrow  pain control

## 2017-06-04 NOTE — PROGRESS NOTE ADULT - SUBJECTIVE AND OBJECTIVE BOX
HEALTH ISSUES - PROBLEM Dx:  Iron deficiency anemia due to chronic blood loss: Iron deficiency anemia due to chronic blood loss  Moyamoya disease: Moyamoya disease          CHEMOTHERAPY REGIMEN:        Day:                          Diet:  Protocol:                                    IVF:      MEDICATIONS  (STANDING):  docusate sodium 100milliGRAM(s) Oral three times a day  insulin lispro (HumaLOG) corrective regimen sliding scale  SubCutaneous Before meals and at bedtime  minocycline 100milliGRAM(s) Oral two times a day  atorvastatin 80milliGRAM(s) Oral at bedtime  OXcarbazepine 600milliGRAM(s) Oral two times a day  ranolazine 1000milliGRAM(s) Oral two times a day  ticagrelor 60milliGRAM(s) Oral two times a day  enoxaparin Injectable 60milliGRAM(s) SubCutaneous every 12 hours  isosorbide   mononitrate ER Tablet (IMDUR) 60milliGRAM(s) Oral daily  isosorbide   mononitrate ER Tablet (IMDUR) 30milliGRAM(s) Oral once    MEDICATIONS  (PRN):  acetaminophen   Tablet. 325milliGRAM(s) Oral every 4 hours PRN Mild Pain (1 - 3)  acetaminophen   Tablet. 650milliGRAM(s) Oral every 6 hours PRN Moderate Pain (4 - 6)  ondansetron Injectable 4milliGRAM(s) IV Push every 6 hours PRN Nausea and/or Vomiting  bisacodyl 5milliGRAM(s) Oral daily PRN Constipation  senna 2Tablet(s) Oral at bedtime PRN Constipation  nitroglycerin     SubLingual 0.4milliGRAM(s) SubLingual every 5 minutes PRN Chest Pain  aluminum hydroxide/magnesium hydroxide/simethicone Suspension 30milliLiter(s) Oral every 4 hours PRN Dyspepsia      Allergies    No Known Allergies    Intolerances        DVT Prophylaxis: [ ] YES [ ] NO      Antibiotics: [ ] YES [ ] NO    Pain Scale (1-10):       Location:    Vital Signs Last 24 Hrs  T(C): 35.6, Max: 36.6 (06-04 @ 06:06)  T(F): 96.1, Max: 97.9 (06-04 @ 06:06)  HR: 80 (72 - 84)  BP: 153/70 (120/62 - 159/68)  BP(mean): 101 (86 - 118)  RR: 17 (16 - 24)  SpO2: 100% (98% - 100%)    Drug Dosing Weight  Height (cm): 172.7 (31 May 2017 07:13)  Weight (kg): 72 (31 May 2017 16:00)  BMI (kg/m2): 24.1 (31 May 2017 16:00)  BSA (m2): 1.85 (31 May 2017 16:00)     Physical Exam:  · Constitutional	detailed exam  · Constitutional Details	well-developed; well-groomed  · Eyes	EOMI; PERRL; no drainage or redness  · ENMT Comments	dry mucous membranes  · Respiratory	detailed exam  · Respiratory Comments	normal breath sounds at the lung bases bilaterally  · Cardiovascular	Regular rate & rhythm, normal S1, S2; no murmurs, gallops or rubs; no S3, S4  · Abd-Soft non tender  ·Ext-no edema, clubbing or cyanosis    URINARY CATHETER: [ ] YES [ ] NO     LABS:  CBC Full  -  ( 04 Jun 2017 05:33 )  WBC Count : 5.1 K/uL  Hemoglobin : 11.2 g/dL  Hematocrit : 33.0 %  Platelet Count - Automated : 203 K/uL  Mean Cell Volume : 84.0 fL  Mean Cell Hemoglobin : 28.5 pg  Mean Cell Hemoglobin Concentration : 33.9 g/dL  Auto Neutrophil # : x  Auto Lymphocyte # : x  Auto Monocyte # : x  Auto Eosinophil # : x  Auto Basophil # : x  Auto Neutrophil % : x  Auto Lymphocyte % : x  Auto Monocyte % : x  Auto Eosinophil % : x  Auto Basophil % : x    06-04    135  |  100  |  11  ----------------------------<  91  4.2   |  25  |  0.70    Ca    8.4      04 Jun 2017 05:33  Phos  4.3     06-04  Mg     1.7     06-04      PTT - ( 03 Jun 2017 05:59 )  PTT:57.4 sec      CULTURES:    RADIOLOGY & ADDITIONAL STUDIES:

## 2017-06-04 NOTE — PROGRESS NOTE ADULT - SUBJECTIVE AND OBJECTIVE BOX
Covering for Dr. Bennett.    INTERVAL HISTORY: Pt with no complaints this am. Stepped down.   	  MEDICATIONS:  ranolazine 1000milliGRAM(s) Oral two times a day  ticagrelor 60milliGRAM(s) Oral two times a day  isosorbide   mononitrate ER Tablet (IMDUR) 30milliGRAM(s) Oral daily  enoxaparin Injectable 60milliGRAM(s) SubCutaneous every 12 hours  nitroglycerin     SubLingual 0.4milliGRAM(s) SubLingual every 5 minutes PRN    minocycline 100milliGRAM(s) Oral two times a day      acetaminophen   Tablet. 325milliGRAM(s) Oral every 4 hours PRN  acetaminophen   Tablet. 650milliGRAM(s) Oral every 6 hours PRN  ondansetron Injectable 4milliGRAM(s) IV Push every 6 hours PRN  OXcarbazepine 600milliGRAM(s) Oral two times a day    docusate sodium 100milliGRAM(s) Oral three times a day  bisacodyl 5milliGRAM(s) Oral daily PRN  senna 2Tablet(s) Oral at bedtime PRN    insulin lispro (HumaLOG) corrective regimen sliding scale  SubCutaneous Before meals and at bedtime  atorvastatin 80milliGRAM(s) Oral at bedtime        PHYSICAL EXAM:  T(C): 36.2, Max: 37.1 (06-03 @ 14:49)  HR: 84 (72 - 776)  BP: 159/68 (120/62 - 168/93)  RR: 17 (16 - 24)  SpO2: 100% (96% - 100%)  Wt(kg): --  I&O's Summary    I & Os for current day (as of 04 Jun 2017 10:16)  =============================================  IN: 500 ml / OUT: 1610 ml / NET: -1110 ml        Appearance: Normal	  HEENT:   Normal oral mucosa, PERRL, EOMI	  Lymphatic: No lymphadenopathy  Cardiovascular: Normal S1 S2, No JVD, No murmurs, No edema  Respiratory: Lungs clear to auscultation	  Psychiatry: A & O x 3, Mood & affect appropriate  Gastrointestinal:  Soft, Non-tender, + BS	  Skin: No rashes, No ecchymoses, No cyanosis  Neurologic: Non-focal  Extremities: Normal range of motion, No clubbing, cyanosis or edema  Vascular: Peripheral pulses palpable 2+ bilaterally    TELEMETRY: 	    ECG:  		    LABS:	 	               11.2   5.1   )-----------( 203      ( 04 Jun 2017 05:33 )             33.0     06-04    135  |  100  |  11  ----------------------------<  91  4.2   |  25  |  0.70    Ca    8.4      04 Jun 2017 05:33  Phos  4.3     06-04  Mg     1.7     06-04    ASSESSMENT/PLAN: 	  30 yo F with Ana johnson, seizure d/o, CAD s/p PCI x 3 to pLAD (last one in 3/2016), normal LVEF, chronic angina 2/2 coronary spasms and jailed small LCx by LAD stent going into distal left main, s/p combined direct /indirect L EC-IC bypass on 5/31/17.   1. Restart Cardizem when BP parameters allow. Now back on Imdur ER - home dose 60 mg qd - titrate up when BP parameters allow.    2. Brillinta at 60 mg q12h (does not need to be on 90 q12h since it has been more than 1 yr since her last PCI). From cardiac perspective, can safely d/c LMWH once Brillinta has been in the system for 48 hrs; would suggest d/c today. ? role of even low dose ASA in addition to Brillinta since she is ASA resistant.      3. Care per primary team Covering for Dr. Bennett.    INTERVAL HISTORY: Pt with no complaints this am, just episode of CP which resolved with nitro. Stepped down.   	  MEDICATIONS:  ranolazine 1000milliGRAM(s) Oral two times a day  ticagrelor 60milliGRAM(s) Oral two times a day  isosorbide   mononitrate ER Tablet (IMDUR) 30milliGRAM(s) Oral daily  enoxaparin Injectable 60milliGRAM(s) SubCutaneous every 12 hours  nitroglycerin     SubLingual 0.4milliGRAM(s) SubLingual every 5 minutes PRN    minocycline 100milliGRAM(s) Oral two times a day      acetaminophen   Tablet. 325milliGRAM(s) Oral every 4 hours PRN  acetaminophen   Tablet. 650milliGRAM(s) Oral every 6 hours PRN  ondansetron Injectable 4milliGRAM(s) IV Push every 6 hours PRN  OXcarbazepine 600milliGRAM(s) Oral two times a day    docusate sodium 100milliGRAM(s) Oral three times a day  bisacodyl 5milliGRAM(s) Oral daily PRN  senna 2Tablet(s) Oral at bedtime PRN    insulin lispro (HumaLOG) corrective regimen sliding scale  SubCutaneous Before meals and at bedtime  atorvastatin 80milliGRAM(s) Oral at bedtime        PHYSICAL EXAM:  T(C): 36.2, Max: 37.1 (06-03 @ 14:49)  HR: 84 (72 - 776)  BP: 159/68 (120/62 - 168/93)  RR: 17 (16 - 24)  SpO2: 100% (96% - 100%)  Wt(kg): --  I&O's Summary    I & Os for current day (as of 04 Jun 2017 10:16)  =============================================  IN: 500 ml / OUT: 1610 ml / NET: -1110 ml        Appearance: Normal	  HEENT:   Normal oral mucosa, PERRL, EOMI	  Lymphatic: No lymphadenopathy  Cardiovascular: Normal S1 S2, No JVD, No murmurs, No edema  Respiratory: Lungs clear to auscultation	  Psychiatry: A & O x 3, Mood & affect appropriate  Gastrointestinal:  Soft, Non-tender, + BS	  Skin: No rashes, No ecchymoses, No cyanosis  Neurologic: Non-focal  Extremities: Normal range of motion, No clubbing, cyanosis or edema  Vascular: Peripheral pulses palpable 2+ bilaterally    TELEMETRY: 	    ECG:  		    LABS:	 	               11.2   5.1   )-----------( 203      ( 04 Jun 2017 05:33 )             33.0     06-04    135  |  100  |  11  ----------------------------<  91  4.2   |  25  |  0.70    Ca    8.4      04 Jun 2017 05:33  Phos  4.3     06-04  Mg     1.7     06-04    ASSESSMENT/PLAN: 	  32 yo F with Ana johnson, seizure d/o, CAD s/p PCI x 3 to pLAD (last one in 3/2016), normal LVEF, chronic angina 2/2 coronary spasms and jailed small LCx by LAD stent going into distal left main, s/p combined direct /indirect L EC-IC bypass on 5/31/17.   1. Restart Cardizem when BP parameters allow. Now back on Imdur ER at only 30 mg qd - home dose 60 mg qd - would suggeset titrate up today as BP parameters seem to allow for this.    2. Brillinta at 60 mg q12h (does not need to be on 90 q12h since it has been more than 1 yr since her last PCI). From cardiac perspective, can safely d/c LMWH once Brillinta has been in the system for 48 hrs; would suggest d/c today. ? role of even low dose ASA in addition to Brillinta since she is ASA resistant.      3. Care per primary team

## 2017-06-05 LAB
% ALBUMIN: 52.1 % — SIGNIFICANT CHANGE UP
% ALPHA 1: 6.7 % — SIGNIFICANT CHANGE UP
% ALPHA 2: 12.8 % — SIGNIFICANT CHANGE UP
% BETA: 10.6 % — SIGNIFICANT CHANGE UP
% GAMMA: 17.8 % — SIGNIFICANT CHANGE UP
ALBUMIN SERPL ELPH-MCNC: 3.1 G/DL — LOW (ref 3.6–5.5)
ALBUMIN/GLOB SERPL ELPH: 1.1 RATIO — SIGNIFICANT CHANGE UP
ALPHA1 GLOB SERPL ELPH-MCNC: 0.4 G/DL — SIGNIFICANT CHANGE UP (ref 0.1–0.4)
ALPHA2 GLOB SERPL ELPH-MCNC: 0.8 G/DL — SIGNIFICANT CHANGE UP (ref 0.5–1)
ANION GAP SERPL CALC-SCNC: 10 MMOL/L — SIGNIFICANT CHANGE UP (ref 5–17)
ANION GAP SERPL CALC-SCNC: 10 MMOL/L — SIGNIFICANT CHANGE UP (ref 5–17)
ANION GAP SERPL CALC-SCNC: 12 MMOL/L — SIGNIFICANT CHANGE UP (ref 5–17)
APPEARANCE UR: CLEAR — SIGNIFICANT CHANGE UP
AT III ACT/NOR PPP CHRO: 62 % — LOW (ref 85–135)
B-GLOBULIN SERPL ELPH-MCNC: 0.6 G/DL — SIGNIFICANT CHANGE UP (ref 0.5–1)
BILIRUB UR-MCNC: NEGATIVE — SIGNIFICANT CHANGE UP
BUN SERPL-MCNC: 11 MG/DL — SIGNIFICANT CHANGE UP (ref 7–23)
BUN SERPL-MCNC: 12 MG/DL — SIGNIFICANT CHANGE UP (ref 7–23)
BUN SERPL-MCNC: 13 MG/DL — SIGNIFICANT CHANGE UP (ref 7–23)
CALCIUM SERPL-MCNC: 8.5 MG/DL — SIGNIFICANT CHANGE UP (ref 8.4–10.5)
CALCIUM SERPL-MCNC: 8.9 MG/DL — SIGNIFICANT CHANGE UP (ref 8.4–10.5)
CALCIUM SERPL-MCNC: 8.9 MG/DL — SIGNIFICANT CHANGE UP (ref 8.4–10.5)
CHLORIDE SERPL-SCNC: 91 MMOL/L — LOW (ref 96–108)
CHLORIDE SERPL-SCNC: 92 MMOL/L — LOW (ref 96–108)
CHLORIDE SERPL-SCNC: 92 MMOL/L — LOW (ref 96–108)
CO2 SERPL-SCNC: 24 MMOL/L — SIGNIFICANT CHANGE UP (ref 22–31)
CO2 SERPL-SCNC: 25 MMOL/L — SIGNIFICANT CHANGE UP (ref 22–31)
CO2 SERPL-SCNC: 26 MMOL/L — SIGNIFICANT CHANGE UP (ref 22–31)
COLOR SPEC: YELLOW — SIGNIFICANT CHANGE UP
CONFIRM APTT STACLOT: NEGATIVE — SIGNIFICANT CHANGE UP
CREAT SERPL-MCNC: 0.6 MG/DL — SIGNIFICANT CHANGE UP (ref 0.5–1.3)
CREAT SERPL-MCNC: 0.6 MG/DL — SIGNIFICANT CHANGE UP (ref 0.5–1.3)
CREAT SERPL-MCNC: 0.8 MG/DL — SIGNIFICANT CHANGE UP (ref 0.5–1.3)
DIFF PNL FLD: (no result)
DRVVT SCREEN TO CONFIRM RATIO: SIGNIFICANT CHANGE UP
EXTRA LAVENDER TOP TUBE: SIGNIFICANT CHANGE UP
GAMMA GLOBULIN: 1.1 G/DL — SIGNIFICANT CHANGE UP (ref 0.6–1.6)
GLUCOSE SERPL-MCNC: 90 MG/DL — SIGNIFICANT CHANGE UP (ref 70–99)
GLUCOSE SERPL-MCNC: 92 MG/DL — SIGNIFICANT CHANGE UP (ref 70–99)
GLUCOSE SERPL-MCNC: 97 MG/DL — SIGNIFICANT CHANGE UP (ref 70–99)
GLUCOSE UR QL: NEGATIVE — SIGNIFICANT CHANGE UP
HCT VFR BLD CALC: 34.3 % — LOW (ref 34.5–45)
HCYS SERPL-MCNC: 5.9 UMOL/L — SIGNIFICANT CHANGE UP (ref 5–15)
HGB BLD-MCNC: 11.7 G/DL — SIGNIFICANT CHANGE UP (ref 11.5–15.5)
KETONES UR-MCNC: NEGATIVE — SIGNIFICANT CHANGE UP
LA NT DPL PPP QL: 29.7 SEC — SIGNIFICANT CHANGE UP
LEUKOCYTE ESTERASE UR-ACNC: NEGATIVE — SIGNIFICANT CHANGE UP
MAGNESIUM SERPL-MCNC: 1.7 MG/DL — SIGNIFICANT CHANGE UP (ref 1.6–2.6)
MCHC RBC-ENTMCNC: 27.5 PG — SIGNIFICANT CHANGE UP (ref 27–34)
MCHC RBC-ENTMCNC: 34.1 G/DL — SIGNIFICANT CHANGE UP (ref 32–36)
MCV RBC AUTO: 80.7 FL — SIGNIFICANT CHANGE UP (ref 80–100)
NITRITE UR-MCNC: NEGATIVE — SIGNIFICANT CHANGE UP
OSMOLALITY UR: 459 MOSMOL/KG — SIGNIFICANT CHANGE UP (ref 100–650)
PH UR: 6.5 — SIGNIFICANT CHANGE UP (ref 5–8)
PHOSPHATE SERPL-MCNC: 3.6 MG/DL — SIGNIFICANT CHANGE UP (ref 2.5–4.5)
PLATELET # BLD AUTO: 228 K/UL — SIGNIFICANT CHANGE UP (ref 150–400)
POTASSIUM SERPL-MCNC: 4.1 MMOL/L — SIGNIFICANT CHANGE UP (ref 3.5–5.3)
POTASSIUM SERPL-MCNC: 4.2 MMOL/L — SIGNIFICANT CHANGE UP (ref 3.5–5.3)
POTASSIUM SERPL-MCNC: 4.6 MMOL/L — SIGNIFICANT CHANGE UP (ref 3.5–5.3)
POTASSIUM SERPL-SCNC: 4.1 MMOL/L — SIGNIFICANT CHANGE UP (ref 3.5–5.3)
POTASSIUM SERPL-SCNC: 4.2 MMOL/L — SIGNIFICANT CHANGE UP (ref 3.5–5.3)
POTASSIUM SERPL-SCNC: 4.6 MMOL/L — SIGNIFICANT CHANGE UP (ref 3.5–5.3)
PROT C ACT/NOR PPP: 77 % — SIGNIFICANT CHANGE UP (ref 74–150)
PROT PATTERN SERPL ELPH-IMP: SIGNIFICANT CHANGE UP
PROT S FREE AG PPP IA-ACNC: 91 % — SIGNIFICANT CHANGE UP (ref 61–131)
PROT UR-MCNC: NEGATIVE MG/DL — SIGNIFICANT CHANGE UP
RBC # BLD: 4.25 M/UL — SIGNIFICANT CHANGE UP (ref 3.8–5.2)
RBC # FLD: 14.9 % — SIGNIFICANT CHANGE UP (ref 10.3–16.9)
SODIUM SERPL-SCNC: 127 MMOL/L — LOW (ref 135–145)
SODIUM SERPL-SCNC: 127 MMOL/L — LOW (ref 135–145)
SODIUM SERPL-SCNC: 128 MMOL/L — LOW (ref 135–145)
SODIUM UR-SCNC: 80 MMOL/L — SIGNIFICANT CHANGE UP
SP GR SPEC: 1.01 — SIGNIFICANT CHANGE UP (ref 1–1.03)
UROBILINOGEN FLD QL: 0.2 E.U./DL — SIGNIFICANT CHANGE UP
WBC # BLD: 4.7 K/UL — SIGNIFICANT CHANGE UP (ref 3.8–10.5)
WBC # FLD AUTO: 4.7 K/UL — SIGNIFICANT CHANGE UP (ref 3.8–10.5)

## 2017-06-05 PROCEDURE — 70450 CT HEAD/BRAIN W/O DYE: CPT | Mod: 26

## 2017-06-05 PROCEDURE — 99233 SBSQ HOSP IP/OBS HIGH 50: CPT

## 2017-06-05 RX ORDER — SODIUM CHLORIDE 5 G/100ML
1000 INJECTION, SOLUTION INTRAVENOUS
Qty: 0 | Refills: 0 | Status: DISCONTINUED | OUTPATIENT
Start: 2017-06-05 | End: 2017-06-06

## 2017-06-05 RX ORDER — SODIUM CHLORIDE 5 G/100ML
1000 INJECTION, SOLUTION INTRAVENOUS
Qty: 0 | Refills: 0 | Status: DISCONTINUED | OUTPATIENT
Start: 2017-06-05 | End: 2017-06-05

## 2017-06-05 RX ORDER — SODIUM CHLORIDE 9 MG/ML
2 INJECTION INTRAMUSCULAR; INTRAVENOUS; SUBCUTANEOUS EVERY 6 HOURS
Qty: 0 | Refills: 0 | Status: DISCONTINUED | OUTPATIENT
Start: 2017-06-05 | End: 2017-06-08

## 2017-06-05 RX ORDER — ENOXAPARIN SODIUM 100 MG/ML
40 INJECTION SUBCUTANEOUS DAILY
Qty: 0 | Refills: 0 | Status: DISCONTINUED | OUTPATIENT
Start: 2017-06-05 | End: 2017-06-05

## 2017-06-05 RX ADMIN — Medication 650 MILLIGRAM(S): at 21:25

## 2017-06-05 RX ADMIN — Medication 100 MILLIGRAM(S): at 06:31

## 2017-06-05 RX ADMIN — SODIUM CHLORIDE 2 GRAM(S): 9 INJECTION INTRAMUSCULAR; INTRAVENOUS; SUBCUTANEOUS at 23:36

## 2017-06-05 RX ADMIN — MINOCYCLINE HYDROCHLORIDE 100 MILLIGRAM(S): 45 TABLET, EXTENDED RELEASE ORAL at 06:31

## 2017-06-05 RX ADMIN — Medication 100 MILLIGRAM(S): at 21:25

## 2017-06-05 RX ADMIN — SODIUM CHLORIDE 50 MILLILITER(S): 5 INJECTION, SOLUTION INTRAVENOUS at 16:37

## 2017-06-05 RX ADMIN — TICAGRELOR 60 MILLIGRAM(S): 90 TABLET ORAL at 06:31

## 2017-06-05 RX ADMIN — RANOLAZINE 1000 MILLIGRAM(S): 500 TABLET, FILM COATED, EXTENDED RELEASE ORAL at 06:31

## 2017-06-05 RX ADMIN — ISOSORBIDE MONONITRATE 60 MILLIGRAM(S): 60 TABLET, EXTENDED RELEASE ORAL at 15:21

## 2017-06-05 RX ADMIN — Medication 650 MILLIGRAM(S): at 21:54

## 2017-06-05 RX ADMIN — ENOXAPARIN SODIUM 60 MILLIGRAM(S): 100 INJECTION SUBCUTANEOUS at 06:32

## 2017-06-05 RX ADMIN — Medication 650 MILLIGRAM(S): at 16:00

## 2017-06-05 RX ADMIN — Medication 650 MILLIGRAM(S): at 15:21

## 2017-06-05 RX ADMIN — Medication 100 MILLIGRAM(S): at 15:21

## 2017-06-05 RX ADMIN — ATORVASTATIN CALCIUM 80 MILLIGRAM(S): 80 TABLET, FILM COATED ORAL at 21:25

## 2017-06-05 RX ADMIN — OXCARBAZEPINE 600 MILLIGRAM(S): 300 TABLET, FILM COATED ORAL at 06:31

## 2017-06-05 RX ADMIN — Medication 650 MILLIGRAM(S): at 06:31

## 2017-06-05 RX ADMIN — Medication 0.4 MILLIGRAM(S): at 15:38

## 2017-06-05 NOTE — DISCHARGE NOTE ADULT - CARE PROVIDERS DIRECT ADDRESSES
[email or web address]

## 2017-06-05 NOTE — OCCUPATIONAL THERAPY INITIAL EVALUATION ADULT - PERTINENT HX OF CURRENT PROBLEM, REHAB EVAL
30 yo F presented with L hemispheric TIAs (intermittent speech difficulties) as well as H/A and seizures. Vascular imaging (MRA, DSA) revealed bilateral ICA occlusion with multiple sources of collateral flow to bilateral hemispheres (mostly PCA on R side, PCA and transdural MMA collaterals on L side), consistent with severe bilateral moyamoya. Direct-indirect L EC-IC bypass: STA-MCA and EDS 5/31. Cerebral angiography 06/01/2017.

## 2017-06-05 NOTE — DISCHARGE NOTE ADULT - HOSPITAL COURSE
31F with Hypertension, dyslipidemia, CAD, s/p stents x3, presented with intermittent speech difficulties, HA, seizures, diagnosed with TIAs.  Imaging revealed bilatearl ICA occlusion, Moyamoya pattern.  SPECT / NOVA showed asymmetric flow favoring R, with worsening perfusion on acetazolamide.  Admitted  for elective L direct and indirect bypass; bypass clotted in the OR - given extra doses of ASA, started on heparin drip  Overnight Events: complaining of generalized weakness, complains of CP this morning - given NTG    PAST MEDICAL & SURGICAL HISTORY: TIA (transient ischemic attack): x 4 Moyamoya disease Dyslipidemia CAD (coronary artery disease): Hx Cardiac stent which was re occluded then open Essential hypertension: Hypertension Epilepsy: Epilepsy Coronary stent occlusion Other postprocedural status: S/P   NKDA  Pt admitted and underwent on 2017 Left Crani for Left ST_MCA bypass direct and indirecte  PROCEDURE  Direct bypass thrombosed spontaneously 10-15 minutes after completion of anastomosis, requiring 2 revisions of anastomosis and repositioning of graft (mechanical kinking), final bypass patent  Evidence of small intraluminal thrombus formation in recipient vessel at end of procedure, po ASA and IV heparin administered   Cerebral angiogram post op  Placed on heparin gtt post op and then switched to brillanta and sq lovenoxs.  Head CT on 2017 revealed     IMPRESSION:    1. Interval development of foci of acute intracranial hemorrhage in the   superior left frontal lobe subjacent to the craniotomy, some of which is   likely superficial subarachnoid and some of which may be intracortical in   distribution.  2. Status post left frontotemporal craniotomy for STA/MCA bypass with   stable postsurgical changes.  3. No change in focal area of diminished attenuation left frontal lobe   which may represent a focus of acute/early subacute ischemic change. 31F with HTN, dyslipidemia, CAD, s/p stents x3 (Brilanta, dosage recently lowered to 90mg BID-> 60mg BID and ), epilepsy , presented with intermittent speech difficulties on 5/31/17, HA, seizures, diagnosed with TIAs. Imaging revealed bilateral ICA occlusion, Moyamoya pattern.  SPECT / NOVA showed asymmetric flow favoring R, with worsening perfusion on acetazolamide.  Admitted 05/31 for elective L direct and indirect bypass; bypass clotted in the OR - given extra doses of ASA, started on heparin drip. POD 1 overnight events: pt complained of gen. weakness, CP (took NTG--resolved). Pt underwent on 5/31/2017 Left Crani for Left STA-MCA bypass direct and indirect.  Direct bypass thrombosed spontaneously 10-15 minutes after completion of anastomosis, requiring 2 revisions of anastomosis and repositioning of graft (mechanical kinking). The final bypass patent. There was evidence of small intraluminal thrombus formation in recipient vessel at end of procedure, PO ASA and IV heparin administered. Pt was placed on a heparin gtt post-op, kept in ICU for close neuro-monitoring. On 6/1/17,  Pt underwent angio, findings were c/w patent left STA-MCA bypass. Drip was stopped 6/3/17 and then switched to home Brilinta and Lovenox, therapeutic dosing and transferred to SDU 6/3/17. 06/05 transferred to NSICU for closer neuro monitoring when f/u CT on 6/5/17 revealed new  interval development of foci of acute intracranial hemorrhage in the superior left frontal lobe subjacent to the craniotomy, partially superficial SAH and partially intracortical. Pt also c/o signs consistent with poss chemical meningitis. Lovenox dosing was changed, with continuation of Brilinta. Pt was also hyponatremic and was started on 2% and Na tabs. Repeat CT on 6/6/17 AM revealed an interval decrease in left posterior frontal parietal subarachnoid hemorrhage. 6/6 overnight, pt had facial droop overnight. Repeat CT in PM was negative, but CTA revealed--Findings compatible with occlusion of left STA MCA bypass. Pt underwent repeat angiogram on 6/7, which did not show flow in direct Left STA- MCA, but indicated possible increased indirect collateral flow to MCA territory.   Post-angio course was uncomplicated. Pt was neurologically stable 6/8, PT/OT evaluated pt, recommended home discharge. Pt is discharged     During hospitalization, cardiology and hematology were following pts course. Psychology was consulted for suicidal ideation, which was r/o. 31F with HTN, dyslipidemia, CAD, s/p stents x3 (Brilanta, dosage recently lowered to 90mg BID-> 60mg BID and ), epilepsy , presented with intermittent speech difficulties on 5/31/17, HA, seizures, diagnosed with TIAs. Imaging revealed bilateral ICA occlusion, Moyamoya pattern.  SPECT / NOVA showed asymmetric flow favoring R, with worsening perfusion on acetazolamide.  Admitted 05/31 for elective L direct and indirect bypass; bypass clotted in the OR - given extra doses of ASA, started on heparin drip. POD 1 overnight events: pt complained of gen. weakness, CP (took NTG--resolved). Pt underwent on 5/31/2017 Left Crani for Left STA-MCA bypass direct and indirect.  Direct bypass thrombosed spontaneously 10-15 minutes after completion of anastomosis, requiring 2 revisions of anastomosis and repositioning of graft (mechanical kinking). The final bypass patent. There was evidence of small intraluminal thrombus formation in recipient vessel at end of procedure, PO ASA and IV heparin administered. Pt was placed on a heparin gtt post-op, kept in ICU for close neuro-monitoring. On 6/1/17,  Pt underwent angio, findings were c/w patent left STA-MCA bypass. Drip was stopped 6/3/17 and then switched to home Brilinta and Lovenox, therapeutic dosing and transferred to SDU 6/3/17. 06/05 transferred to NSICU for closer neuro monitoring when f/u CT on 6/5/17 revealed new  interval development of foci of acute intracranial hemorrhage in the superior left frontal lobe subjacent to the craniotomy, partially superficial SAH and partially intracortical. Pt also c/o signs consistent with poss chemical meningitis. Lovenox dosing was changed, with continuation of Brilinta. Pt was also hyponatremic and was started on 2% and Na tabs. Repeat CT on 6/6/17 AM revealed an interval decrease in left posterior frontal parietal subarachnoid hemorrhage. 6/6 overnight, pt had facial droop overnight. Repeat CT in PM was negative, but CTA revealed--Findings compatible with occlusion of left STA MCA bypass. Pt underwent repeat angiogram on 6/7, which did not show flow in direct Left STA- MCA, but indicated possible increased indirect collateral flow to MCA territory.   Post-angio course was uncomplicated and R groin dressing was removed. Pt was neurologically stable 6/8, PT/OT evaluated pt, recommended home discharge.     Pt will continue a decadron taper at home. Pts blood pressure has been successfully managed inpatient with a goal of -160, cardizem was not restarted. Pt is discharged     During hospitalization, cardiology and hematology were following pts course. Psychology was consulted for suicidal ideation, which was r/o.

## 2017-06-05 NOTE — DISCHARGE NOTE ADULT - MEDICATION SUMMARY - MEDICATIONS TO TAKE
I will START or STAY ON the medications listed below when I get home from the hospital:    dexamethasone 2 mg oral tablet  -- 2mg PO q8h x 3 doses, then 2mg PO q12h x 2 doses, then 2mg PO x 1 dose, then stop  -- It is very important that you take or use this exactly as directed.  Do not skip doses or discontinue unless directed by your doctor.  Obtain medical advice before taking any non-prescription drugs as some may affect the action of this medication.  Take with food or milk.    -- Indication: For Moyamoya disease    fludrocortisone 0.1 mg oral tablet  -- 1 tab(s) by mouth once a day  -- Indication: For hyponatremia    acetaminophen-oxycodone 325 mg-5 mg oral tablet  -- 1 tab(s) by mouth every 4 hours, As needed,Pain (7 - 10) MDD:3 grams tylenol  -- Indication: For pain    isosorbide mononitrate 30 mg oral tablet, extended release  -- 1 tab(s) by mouth once a day  -- Indication: For Chest pain    nitroglycerin 0.4 mg sublingual tablet  -- 1 tab(s) under tongue every 5 minutes, As Needed - for chest pain Maximum Up To Three Doses    Dispense: 1 vial  -- Indication: For Chest pain    Ranexa 1000 mg oral tablet, extended release  -- 1 tab(s) by mouth 2 times a day  -- Indication: For Chest pain    OXcarbazepine 600 mg oral tablet  -- 1 tab(s) by mouth 2 times a day  -- Indication: For seizures    atorvastatin 80 mg oral tablet  -- 1 tab(s) by mouth once a day (at bedtime)  -- Indication: For hyperlipidemia     Brilinta (ticagrelor) 60 mg oral tablet  -- 60 milligram(s) by mouth 2 times a day  -- It is very important that you take or use this exactly as directed.  Do not skip doses or discontinue unless directed by your doctor.  Obtain medical advice before taking any non-prescription drugs as some may affect the action of this medication.    -- Indication: For Moyamoya disease    docusate sodium 100 mg oral capsule  -- 1 cap(s) by mouth 3 times a day  -- Indication: For Constipation    pantoprazole 40 mg oral delayed release tablet  -- 1 tab(s) by mouth once a day (before a meal)  -- Indication: For GI prophylaxis

## 2017-06-05 NOTE — PHYSICAL THERAPY INITIAL EVALUATION ADULT - DID THE PATIENT HAVE SURGERY?
Combined direct-indirect L EC-IC bypass: STA-MCA and EDS/yes yes/Combined direct-indirect L EC-IC bypass: STA-MCA and EDS; Cerebral angiography 6/1/17

## 2017-06-05 NOTE — DISCHARGE NOTE ADULT - MEDICATION SUMMARY - MEDICATIONS TO CHANGE
I will SWITCH the dose or number of times a day I take the medications listed below when I get home from the hospital:    Brilinta (ticagrelor) 90 mg oral tablet  -- 1 tab(s) by mouth 2 times a day

## 2017-06-05 NOTE — DISCHARGE NOTE ADULT - PLAN OF CARE
return home and regain your independent activity diet: regular  activity as tolerated  No heavy lifting anything greater than 10 pounds, no bending or twisting  Able to shower and wash your hair,pat it dry with a clean towel  Call the office once you are home to make a follow up appointment and remove the staples  Call the office if any drainage from the wound diet: regular  activity as tolerated  No heavy lifting anything greater than 10 pounds, no bending or twisting  Able to shower and wash your hair, pat it dry with a clean towel  Call Dr. Long's office once you are home to make a follow up appointment and remove the staples (in 7 days). Call the office if any drainage from the wound.  Call Dr. Bennett's office to make a follow up appointment within 7 days. Do not start Cardizem until follow up with cardiologist. Continue taking Brilinta until follow up appointment with cardiologist.

## 2017-06-05 NOTE — PHYSICAL THERAPY INITIAL EVALUATION ADULT - GENERAL OBSERVATIONS, REHAB EVAL
Rec'd pt supine in bed, .+EKG, +heplock, cleared for PT and OOB activity by RN (Anu). C/o headache and nausea; pain level not rated by pt.

## 2017-06-05 NOTE — OCCUPATIONAL THERAPY INITIAL EVALUATION ADULT - GENERAL OBSERVATIONS, REHAB EVAL
Right hand dominant. Patient cleared for Occupational Therapy by Neurosurgery NP Ariane and RN Anu. Patient received supine in non-acute distress, +EKG, +IV heplock, + left cranial incision with staples C/D/I. Patient reports left headache with fatigue.

## 2017-06-05 NOTE — PROGRESS NOTE ADULT - SUBJECTIVE AND OBJECTIVE BOX
HPI:  32 yo woman with extensive medical history including HTN, dyslipidemia, CAD vs coronary vasospasm S/P PTCA x 3, who recently presented with L hemispheric TIAs (intermittent speech difficulties) as well as H/A and seizures. Vascular imaging (MRA, DSA) revealed bilateral ICA occlusion with multiple sources of collateral flow to bilateral hemispheres (mostly PCA on R side, PCA and transdural MMA collaterals on L side), consistent with severe bilateral moyamoya. Perfusion imaging (SPECT, NOVA) showed asymmetric flow favoring R side, which is worse after acetazolamide challenge. Bilateral cerebral revascularization was thus indicated, starting with the more symptomatic L side. Patient is being admitted electively for L direct-indirect EC-IC bypass. (31 May 2017 15:12)    OVERNIGHT EVENTS:  Vital Signs Last 24 Hrs  T(C): 36.6, Max: 37.1 (06-04 @ 18:00)  T(F): 97.9, Max: 98.7 (06-04 @ 18:00)  HR: 76 (76 - 84)  BP: 119/81 (119/81 - 159/68)  BP(mean): 95 (95 - 115)  RR: 18 (17 - 19)  SpO2: 100% (100% - 100%)    I&O's Summary    I & Os for current day (as of 05 Jun 2017 08:01)  =============================================  IN: 0 ml / OUT: 700 ml / NET: -700 ml      PHYSICAL EXAM:  Neurological:  A&OX3 Cranial nerves intact  PRATER 5/5 no drift or dysmetria         Cardiovascular: RRR  Respiratory: Lungs CTAB  Gastrointestinal: +BS  Genitourinary: Voiding without difficulty  Extremities: warm  and dry  Incision/Wound: CDI CATY    DIET: Low Sodium diet    LABS:                        11.7   4.7   )-----------( 228      ( 05 Jun 2017 07:08 )             34.3     06-05    127<L>  |  91<L>  |  11  ----------------------------<  90  4.2   |  24  |  0.60    Ca    8.9      05 Jun 2017 07:08  Phos  3.6     06-05  Mg     1.7     06-05    TPro  5.9<L>  /  Alb  x   /  TBili  x   /  DBili  x   /  AST  x   /  ALT  x   /  AlkPhos  x   06-04        CARDIAC MARKERS ( 04 Jun 2017 06:24 )  x     / <0.01 ng/mL / 252 U/L / x     / 2.0 ng/mL      CAPILLARY BLOOD GLUCOSE  99 (05 Jun 2017 05:28)  98 (04 Jun 2017 22:06)  99 (04 Jun 2017 16:12)  96 (04 Jun 2017 11:41)      Drug Levels: [] N/A    CSF Analysis: [] N/A      Allergies    No Known Allergies    Intolerances      MEDICATIONS:  Antibiotics:  minocycline 100milliGRAM(s) Oral two times a day    Neuro:  acetaminophen   Tablet. 325milliGRAM(s) Oral every 4 hours PRN  acetaminophen   Tablet. 650milliGRAM(s) Oral every 6 hours PRN  ondansetron Injectable 4milliGRAM(s) IV Push every 6 hours PRN  OXcarbazepine 600milliGRAM(s) Oral two times a day    Anticoagulation:  ticagrelor 60milliGRAM(s) Oral two times a day  enoxaparin Injectable 60milliGRAM(s) SubCutaneous every 12 hours    OTHER:  docusate sodium 100milliGRAM(s) Oral three times a day  bisacodyl 5milliGRAM(s) Oral daily PRN  senna 2Tablet(s) Oral at bedtime PRN  insulin lispro (HumaLOG) corrective regimen sliding scale  SubCutaneous Before meals and at bedtime  atorvastatin 80milliGRAM(s) Oral at bedtime  ranolazine 1000milliGRAM(s) Oral two times a day  nitroglycerin     SubLingual 0.4milliGRAM(s) SubLingual every 5 minutes PRN  isosorbide   mononitrate ER Tablet (IMDUR) 60milliGRAM(s) Oral daily  aluminum hydroxide/magnesium hydroxide/simethicone Suspension 30milliLiter(s) Oral every 4 hours PRN    I  ASSESSMENT:  31y Female s/p  STA-MCA bypass for sandra sandra disease    PLAN:    NEURO:  Monitor neuro status  OT/PT  Head CT  HA [place]  Continue current medial regime    Dispo: Discussed with attending HPI:  30 yo woman with extensive medical history including HTN, dyslipidemia, CAD vs coronary vasospasm S/P PTCA x 3, who recently presented with L hemispheric TIAs (intermittent speech difficulties) as well as H/A and seizures. Vascular imaging (MRA, DSA) revealed bilateral ICA occlusion with multiple sources of collateral flow to bilateral hemispheres (mostly PCA on R side, PCA and transdural MMA collaterals on L side), consistent with severe bilateral moyamoya. Perfusion imaging (SPECT, NOVA) showed asymmetric flow favoring R side, which is worse after acetazolamide challenge. Bilateral cerebral revascularization was thus indicated, starting with the more symptomatic L side. Patient is being admitted electively for L direct-indirect EC-IC bypass. (31 May 2017 15:12)    OVERNIGHT EVENTS:  Vital Signs Last 24 Hrs  T(C): 36.6, Max: 37.1 (06-04 @ 18:00)  T(F): 97.9, Max: 98.7 (06-04 @ 18:00)  HR: 76 (76 - 84)  BP: 119/81 (119/81 - 159/68)  BP(mean): 95 (95 - 115)  RR: 18 (17 - 19)  SpO2: 100% (100% - 100%)    I&O's Summary    I & Os for current day (as of 05 Jun 2017 08:01)  =============================================  IN: 0 ml / OUT: 700 ml / NET: -700 ml      PHYSICAL EXAM:  Neurological:  A&OX3 Cranial nerves intact  PRATER 5/5 no drift or dysmetria         Cardiovascular: RRR  Respiratory: Lungs CTAB  Gastrointestinal: +BS  Genitourinary: Voiding without difficulty  Extremities: warm  and dry  Incision/Wound: CDI CATY    DIET: Low Sodium diet    LABS:                        11.7   4.7   )-----------( 228      ( 05 Jun 2017 07:08 )             34.3     06-05    127<L>  |  91<L>  |  11  ----------------------------<  90  4.2   |  24  |  0.60    Ca    8.9      05 Jun 2017 07:08  Phos  3.6     06-05  Mg     1.7     06-05    TPro  5.9<L>  /  Alb  x   /  TBili  x   /  DBili  x   /  AST  x   /  ALT  x   /  AlkPhos  x   06-04        CARDIAC MARKERS ( 04 Jun 2017 06:24 )  x     / <0.01 ng/mL / 252 U/L / x     / 2.0 ng/mL      CAPILLARY BLOOD GLUCOSE  99 (05 Jun 2017 05:28)  98 (04 Jun 2017 22:06)  99 (04 Jun 2017 16:12)  96 (04 Jun 2017 11:41)      Drug Levels: [] N/A    CSF Analysis: [] N/A      Allergies    No Known Allergies    Intolerances      MEDICATIONS:  Antibiotics:  minocycline 100milliGRAM(s) Oral two times a day    Neuro:  acetaminophen   Tablet. 325milliGRAM(s) Oral every 4 hours PRN  acetaminophen   Tablet. 650milliGRAM(s) Oral every 6 hours PRN  ondansetron Injectable 4milliGRAM(s) IV Push every 6 hours PRN  OXcarbazepine 600milliGRAM(s) Oral two times a day    Anticoagulation:  ticagrelor 60milliGRAM(s) Oral two times a day  enoxaparin Injectable 60milliGRAM(s) SubCutaneous every 12 hours    OTHER:  docusate sodium 100milliGRAM(s) Oral three times a day  bisacodyl 5milliGRAM(s) Oral daily PRN  senna 2Tablet(s) Oral at bedtime PRN  insulin lispro (HumaLOG) corrective regimen sliding scale  SubCutaneous Before meals and at bedtime  atorvastatin 80milliGRAM(s) Oral at bedtime  ranolazine 1000milliGRAM(s) Oral two times a day  nitroglycerin     SubLingual 0.4milliGRAM(s) SubLingual every 5 minutes PRN  isosorbide   mononitrate ER Tablet (IMDUR) 60milliGRAM(s) Oral daily  aluminum hydroxide/magnesium hydroxide/simethicone Suspension 30milliLiter(s) Oral every 4 hours PRN    I  ASSESSMENT:  31y Female s/p  STA-MCA bypass for sandra sandra disease  Hyponatremia    PLAN:    NEURO:  Monitor neuro status  OT/PT  Head CT  HA Management  Continue current medial regime  Repeat Serum Osmo, Na Urine osmo  Change lovenox to 40mg daily  Dispo: Discussed with attending

## 2017-06-05 NOTE — PHYSICAL THERAPY INITIAL EVALUATION ADULT - PERTINENT HX OF CURRENT PROBLEM, REHAB EVAL
30 y/o F who recently presented with L hemispheric TIAs (intermittent speech difficulties) as well as H/A and seizures. Patient is being admitted electively for L direct-indirect EC-IC bypass.

## 2017-06-05 NOTE — DISCHARGE NOTE ADULT - CARE PROVIDER_API CALL
Mike Long), Neurological Surgery  [address] [address]  Phone: [phone]  Fax: [phone] Mike Long), Neurological Surgery  [address] [address]  Phone: [phone]  Fax: [phone]    John Bennett), Cardiology; Internal Medicine; Nuclear Cardiology  [address] [place] [address]  Phone: [phone]  Fax: [phone]

## 2017-06-05 NOTE — PHYSICAL THERAPY INITIAL EVALUATION ADULT - ADDITIONAL COMMENTS
Pt lives at home with spouse, [address]. Though will be staying with her mother, ground floor, no REBECCA. Prior to admission: independent with functional mobility, no Ad. Right hand dominant.

## 2017-06-05 NOTE — DISCHARGE NOTE ADULT - MEDICATION SUMMARY - MEDICATIONS TO STOP TAKING
I will STOP taking the medications listed below when I get home from the hospital:    aspirin 325 mg oral tablet  -- 1 tab(s) by mouth once a day    Cardizem  mg/24 hours oral capsule, extended release  -- 1 cap(s) by mouth once a day

## 2017-06-05 NOTE — PROGRESS NOTE ADULT - SUBJECTIVE AND OBJECTIVE BOX
INTERVAL HISTORY: No new events over the weekend.   	  MEDICATIONS:  ranolazine 1000milliGRAM(s) Oral two times a day  nitroglycerin     SubLingual 0.4milliGRAM(s) SubLingual every 5 minutes PRN  isosorbide   mononitrate ER Tablet (IMDUR) 60milliGRAM(s) Oral daily  minocycline 100milliGRAM(s) Oral two times a day  acetaminophen   Tablet. 325milliGRAM(s) Oral every 4 hours PRN  acetaminophen   Tablet. 650milliGRAM(s) Oral every 6 hours PRN  ondansetron Injectable 4milliGRAM(s) IV Push every 6 hours PRN  OXcarbazepine 600milliGRAM(s) Oral two times a day  docusate sodium 100milliGRAM(s) Oral three times a day  bisacodyl 5milliGRAM(s) Oral daily PRN  senna 2Tablet(s) Oral at bedtime PRN  aluminum hydroxide/magnesium hydroxide/simethicone Suspension 30milliLiter(s) Oral every 4 hours PRN  insulin lispro (HumaLOG) corrective regimen sliding scale  SubCutaneous Before meals and at bedtime  atorvastatin 80milliGRAM(s) Oral at bedtime  ticagrelor 60milliGRAM(s) Oral two times a day      PHYSICAL EXAM:  T(C): 36.2, Max: 37.1 (06-04 @ 18:00)  HR: 86 (76 - 86)  BP: 133/77 (119/81 - 153/70)  RR: 16 (16 - 18)  SpO2: 100% (100% - 100%)  Wt(kg): --  I&O's Summary    I & Os for current day (as of 05 Jun 2017 12:42)  =============================================  IN: 0 ml / OUT: 700 ml / NET: -700 ml        Appearance: Normal	  HEENT:   Normal oral mucosa, PERRL, EOMI	  Lymphatic: No lymphadenopathy  Cardiovascular: Normal S1 S2, No JVD, No murmurs   Respiratory: Lungs clear to auscultation	  Psychiatry: A & O x 3  Gastrointestinal:  Soft, Non-tender, + BS	  Skin: No rashes, No ecchymoses, No cyanosis  Neurologic: Non-focal  Extremities: No clubbing, cyanosis or edema    LABS:	 	  CARDIAC MARKERS:                                11.7   4.7   )-----------( 228      ( 05 Jun 2017 07:08 )             34.3     06-05    127<L>  |  92<L>  |  12  ----------------------------<  92  4.1   |  25  |  0.60    Ca    8.9      05 Jun 2017 11:17  Phos  3.6     06-05  Mg     1.7     06-05    TPro  5.9<L>  /  Alb  3.1<L>  /  TBili  x   /  DBili  x   /  AST  x   /  ALT  x   /  AlkPhos  x   06-04      ASSESSMENT/PLAN: 	32 yo F with Ana johnson, seizure d/o, CAD s/p PCI x 3 to pLAD (last one in 3/2016), normal LVEF, chronic angina 2/2 coronary spasms and jailed small LCx by LAD stent going into distal left main, s/p combined direct /indirect L EC-IC bypass on 5/31/17.   1. Restart Cardizem when NS ok with BP parameters. Cont Imdur ER - home dose 60 mg qd   2. Brillinta at 60 mg q12h. No a/c per NS due to inc risk of intracranial bleed. ? role of even low dose ASA in addition to Brillinta since she is ASA resistant.      3. Care per primary team - repeat BMP due to low Na

## 2017-06-05 NOTE — DISCHARGE NOTE ADULT - PATIENT PORTAL LINK FT
“You can access the [facility] Patient Portal, offered by [facility], by registering with the following website: [email or web address]”

## 2017-06-05 NOTE — DISCHARGE NOTE ADULT - CARE PLAN
Principal Discharge DX:	Moyamoya disease  Goal:	return home and regain your independent activity  Instructions for follow-up, activity and diet:	diet: regular  activity as tolerated  No heavy lifting anything greater than 10 pounds, no bending or twisting  Able to shower and wash your hair,pat it dry with a clean towel  Call the office once you are home to make a follow up appointment and remove the staples  Call the office if any drainage from the wound Principal Discharge DX:	Moyamoya disease  Goal:	return home and regain your independent activity  Instructions for follow-up, activity and diet:	diet: regular  activity as tolerated  No heavy lifting anything greater than 10 pounds, no bending or twisting  Able to shower and wash your hair, pat it dry with a clean towel  Call Dr. Long's office once you are home to make a follow up appointment and remove the staples (in 7 days). Call the office if any drainage from the wound.  Call Dr. Bennett's office to make a follow up appointment within 7 days. Do not start Cardizem until follow up with cardiologist. Continue taking Brilinta until follow up appointment with cardiologist.

## 2017-06-05 NOTE — DISCHARGE NOTE ADULT - NS AS ACTIVITY OBS
Driving allowed/Showering allowed/No Heavy lifting/straining/Walking-Indoors allowed/Walking-Outdoors allowed/Do not drive or operate machinery

## 2017-06-06 DIAGNOSIS — F43.21 ADJUSTMENT DISORDER WITH DEPRESSED MOOD: ICD-10-CM

## 2017-06-06 DIAGNOSIS — F43.29 ADJUSTMENT DISORDER WITH OTHER SYMPTOMS: ICD-10-CM

## 2017-06-06 LAB
ANA TITR SER: NEGATIVE — SIGNIFICANT CHANGE UP
ANION GAP SERPL CALC-SCNC: 10 MMOL/L — SIGNIFICANT CHANGE UP (ref 5–17)
ANION GAP SERPL CALC-SCNC: 11 MMOL/L — SIGNIFICANT CHANGE UP (ref 5–17)
ANION GAP SERPL CALC-SCNC: 14 MMOL/L — SIGNIFICANT CHANGE UP (ref 5–17)
APTT BLD: 31.3 SEC — SIGNIFICANT CHANGE UP (ref 27.5–37.4)
B2 GLYCOPROT1 AB SER QL: NEGATIVE — SIGNIFICANT CHANGE UP
BUN SERPL-MCNC: 7 MG/DL — SIGNIFICANT CHANGE UP (ref 7–23)
BUN SERPL-MCNC: 9 MG/DL — SIGNIFICANT CHANGE UP (ref 7–23)
BUN SERPL-MCNC: 9 MG/DL — SIGNIFICANT CHANGE UP (ref 7–23)
CALCIUM SERPL-MCNC: 8.4 MG/DL — SIGNIFICANT CHANGE UP (ref 8.4–10.5)
CALCIUM SERPL-MCNC: 8.6 MG/DL — SIGNIFICANT CHANGE UP (ref 8.4–10.5)
CALCIUM SERPL-MCNC: 8.7 MG/DL — SIGNIFICANT CHANGE UP (ref 8.4–10.5)
CARDIOLIPIN AB SER-ACNC: NEGATIVE — SIGNIFICANT CHANGE UP
CHLORIDE SERPL-SCNC: 101 MMOL/L — SIGNIFICANT CHANGE UP (ref 96–108)
CHLORIDE SERPL-SCNC: 99 MMOL/L — SIGNIFICANT CHANGE UP (ref 96–108)
CHLORIDE SERPL-SCNC: 99 MMOL/L — SIGNIFICANT CHANGE UP (ref 96–108)
CO2 SERPL-SCNC: 22 MMOL/L — SIGNIFICANT CHANGE UP (ref 22–31)
CO2 SERPL-SCNC: 24 MMOL/L — SIGNIFICANT CHANGE UP (ref 22–31)
CO2 SERPL-SCNC: 25 MMOL/L — SIGNIFICANT CHANGE UP (ref 22–31)
CREAT SERPL-MCNC: 0.6 MG/DL — SIGNIFICANT CHANGE UP (ref 0.5–1.3)
CREAT SERPL-MCNC: 0.6 MG/DL — SIGNIFICANT CHANGE UP (ref 0.5–1.3)
CREAT SERPL-MCNC: 0.7 MG/DL — SIGNIFICANT CHANGE UP (ref 0.5–1.3)
GLUCOSE SERPL-MCNC: 104 MG/DL — HIGH (ref 70–99)
GLUCOSE SERPL-MCNC: 141 MG/DL — HIGH (ref 70–99)
GLUCOSE SERPL-MCNC: 145 MG/DL — HIGH (ref 70–99)
HCT VFR BLD CALC: 33.4 % — LOW (ref 34.5–45)
HGB BLD-MCNC: 11.3 G/DL — LOW (ref 11.5–15.5)
INR BLD: 1.02 — SIGNIFICANT CHANGE UP (ref 0.88–1.16)
LIDOCAIN SERPL-MCNC: 129 NMOL/L — HIGH
MAGNESIUM SERPL-MCNC: 2.2 MG/DL — SIGNIFICANT CHANGE UP (ref 1.6–2.6)
MCHC RBC-ENTMCNC: 28.2 PG — SIGNIFICANT CHANGE UP (ref 27–34)
MCHC RBC-ENTMCNC: 33.8 G/DL — SIGNIFICANT CHANGE UP (ref 32–36)
MCV RBC AUTO: 83.3 FL — SIGNIFICANT CHANGE UP (ref 80–100)
PHOSPHATE SERPL-MCNC: 2.5 MG/DL — SIGNIFICANT CHANGE UP (ref 2.5–4.5)
PLATELET # BLD AUTO: 228 K/UL — SIGNIFICANT CHANGE UP (ref 150–400)
POTASSIUM SERPL-MCNC: 4.8 MMOL/L — SIGNIFICANT CHANGE UP (ref 3.5–5.3)
POTASSIUM SERPL-MCNC: 4.8 MMOL/L — SIGNIFICANT CHANGE UP (ref 3.5–5.3)
POTASSIUM SERPL-MCNC: 5.3 MMOL/L — SIGNIFICANT CHANGE UP (ref 3.5–5.3)
POTASSIUM SERPL-SCNC: 4.8 MMOL/L — SIGNIFICANT CHANGE UP (ref 3.5–5.3)
POTASSIUM SERPL-SCNC: 4.8 MMOL/L — SIGNIFICANT CHANGE UP (ref 3.5–5.3)
POTASSIUM SERPL-SCNC: 5.3 MMOL/L — SIGNIFICANT CHANGE UP (ref 3.5–5.3)
PROTHROM AB SERPL-ACNC: 11.3 SEC — SIGNIFICANT CHANGE UP (ref 9.8–12.7)
RBC # BLD: 4.01 M/UL — SIGNIFICANT CHANGE UP (ref 3.8–5.2)
RBC # FLD: 15.5 % — SIGNIFICANT CHANGE UP (ref 10.3–16.9)
SODIUM SERPL-SCNC: 134 MMOL/L — LOW (ref 135–145)
SODIUM SERPL-SCNC: 135 MMOL/L — SIGNIFICANT CHANGE UP (ref 135–145)
SODIUM SERPL-SCNC: 136 MMOL/L — SIGNIFICANT CHANGE UP (ref 135–145)
WBC # BLD: 5.1 K/UL — SIGNIFICANT CHANGE UP (ref 3.8–10.5)
WBC # FLD AUTO: 5.1 K/UL — SIGNIFICANT CHANGE UP (ref 3.8–10.5)

## 2017-06-06 PROCEDURE — 99223 1ST HOSP IP/OBS HIGH 75: CPT

## 2017-06-06 PROCEDURE — 93970 EXTREMITY STUDY: CPT | Mod: 26

## 2017-06-06 PROCEDURE — 99291 CRITICAL CARE FIRST HOUR: CPT | Mod: 24

## 2017-06-06 PROCEDURE — 70496 CT ANGIOGRAPHY HEAD: CPT | Mod: 26,59

## 2017-06-06 PROCEDURE — 99232 SBSQ HOSP IP/OBS MODERATE 35: CPT

## 2017-06-06 PROCEDURE — 70450 CT HEAD/BRAIN W/O DYE: CPT | Mod: 26

## 2017-06-06 RX ORDER — SODIUM CHLORIDE 5 G/100ML
1000 INJECTION, SOLUTION INTRAVENOUS
Qty: 0 | Refills: 0 | Status: DISCONTINUED | OUTPATIENT
Start: 2017-06-06 | End: 2017-06-06

## 2017-06-06 RX ORDER — SODIUM CHLORIDE 5 G/100ML
1000 INJECTION, SOLUTION INTRAVENOUS
Qty: 0 | Refills: 0 | Status: DISCONTINUED | OUTPATIENT
Start: 2017-06-06 | End: 2017-06-07

## 2017-06-06 RX ORDER — SODIUM CHLORIDE 9 MG/ML
1000 INJECTION INTRAMUSCULAR; INTRAVENOUS; SUBCUTANEOUS
Qty: 0 | Refills: 0 | Status: DISCONTINUED | OUTPATIENT
Start: 2017-06-06 | End: 2017-06-07

## 2017-06-06 RX ORDER — DEXAMETHASONE 0.5 MG/5ML
4 ELIXIR ORAL EVERY 6 HOURS
Qty: 0 | Refills: 0 | Status: DISCONTINUED | OUTPATIENT
Start: 2017-06-06 | End: 2017-06-06

## 2017-06-06 RX ORDER — PANTOPRAZOLE SODIUM 20 MG/1
40 TABLET, DELAYED RELEASE ORAL
Qty: 0 | Refills: 0 | Status: DISCONTINUED | OUTPATIENT
Start: 2017-06-06 | End: 2017-06-08

## 2017-06-06 RX ORDER — DEXAMETHASONE 0.5 MG/5ML
4 ELIXIR ORAL EVERY 6 HOURS
Qty: 0 | Refills: 0 | Status: DISCONTINUED | OUTPATIENT
Start: 2017-06-06 | End: 2017-06-07

## 2017-06-06 RX ADMIN — Medication 0.4 MILLIGRAM(S): at 07:51

## 2017-06-06 RX ADMIN — Medication 100 MILLIGRAM(S): at 13:45

## 2017-06-06 RX ADMIN — Medication 100 MILLIGRAM(S): at 22:21

## 2017-06-06 RX ADMIN — ISOSORBIDE MONONITRATE 60 MILLIGRAM(S): 60 TABLET, EXTENDED RELEASE ORAL at 12:01

## 2017-06-06 RX ADMIN — RANOLAZINE 1000 MILLIGRAM(S): 500 TABLET, FILM COATED, EXTENDED RELEASE ORAL at 05:28

## 2017-06-06 RX ADMIN — OXCARBAZEPINE 600 MILLIGRAM(S): 300 TABLET, FILM COATED ORAL at 17:50

## 2017-06-06 RX ADMIN — TICAGRELOR 60 MILLIGRAM(S): 90 TABLET ORAL at 18:12

## 2017-06-06 RX ADMIN — Medication 4 MILLIGRAM(S): at 12:01

## 2017-06-06 RX ADMIN — RANOLAZINE 1000 MILLIGRAM(S): 500 TABLET, FILM COATED, EXTENDED RELEASE ORAL at 17:51

## 2017-06-06 RX ADMIN — SODIUM CHLORIDE 2 GRAM(S): 9 INJECTION INTRAMUSCULAR; INTRAVENOUS; SUBCUTANEOUS at 12:01

## 2017-06-06 RX ADMIN — ATORVASTATIN CALCIUM 80 MILLIGRAM(S): 80 TABLET, FILM COATED ORAL at 22:21

## 2017-06-06 RX ADMIN — Medication 4 MILLIGRAM(S): at 07:01

## 2017-06-06 RX ADMIN — Medication 4 MILLIGRAM(S): at 18:12

## 2017-06-06 RX ADMIN — Medication 650 MILLIGRAM(S): at 20:08

## 2017-06-06 RX ADMIN — SODIUM CHLORIDE 2 GRAM(S): 9 INJECTION INTRAMUSCULAR; INTRAVENOUS; SUBCUTANEOUS at 05:28

## 2017-06-06 RX ADMIN — TICAGRELOR 60 MILLIGRAM(S): 90 TABLET ORAL at 05:28

## 2017-06-06 RX ADMIN — SODIUM CHLORIDE 2 GRAM(S): 9 INJECTION INTRAMUSCULAR; INTRAVENOUS; SUBCUTANEOUS at 17:50

## 2017-06-06 RX ADMIN — Medication 650 MILLIGRAM(S): at 18:16

## 2017-06-06 RX ADMIN — Medication 100 MILLIGRAM(S): at 05:27

## 2017-06-06 RX ADMIN — OXCARBAZEPINE 600 MILLIGRAM(S): 300 TABLET, FILM COATED ORAL at 05:28

## 2017-06-06 NOTE — PROVIDER CONTACT NOTE (CHANGE IN STATUS NOTIFICATION) - SITUATION
s/p bypass with jocy sandra, pt complains of mouth numbness and cramping with right sided facial droop.  pupils react briskly, full strength upper and lower extremities and pt A&Ox4.

## 2017-06-06 NOTE — BEHAVIORAL HEALTH ASSESSMENT NOTE - SUMMARY
30 yo Armenian speaking  woman with extensive medical history, currently denies SI/plan or intent. Pt was frustrated with being in the hospital which triggered her suicidal statement. Pt however has no prior hx/o SI and denies intent to harm herself. Pt will greatly benefit from supportive therapy after discharge given high burden of medical illness. She however declines referrals, identifying her family as a source of support.

## 2017-06-06 NOTE — BEHAVIORAL HEALTH ASSESSMENT NOTE - NSBHSUICPROTECTFACT_PSY_A_CORE
Responsibility to family and others/Identifies reasons for living/Supportive social network or family/Future oriented/Ability to cope with stress/Positive therapeutic relationships

## 2017-06-06 NOTE — BEHAVIORAL HEALTH ASSESSMENT NOTE - PROBLEM SELECTOR PLAN 1
Pt denies acute depressive sx's or SI at this time. Pt declines referrals to mental health services.

## 2017-06-06 NOTE — PROGRESS NOTE ADULT - SUBJECTIVE AND OBJECTIVE BOX
INTERVAL HISTORY: no cp. Had SI in am.   	  MEDICATIONS:  ranolazine 1000milliGRAM(s) Oral two times a day  isosorbide   mononitrate ER Tablet (IMDUR) 60milliGRAM(s) Oral daily  acetaminophen   Tablet. 325milliGRAM(s) Oral every 4 hours PRN  acetaminophen   Tablet. 650milliGRAM(s) Oral every 6 hours PRN  ondansetron Injectable 4milliGRAM(s) IV Push every 6 hours PRN  OXcarbazepine 600milliGRAM(s) Oral two times a day  oxyCODONE  5 mG/acetaminophen 325 mG 1Tablet(s) Oral every 4 hours PRN  docusate sodium 100milliGRAM(s) Oral three times a day  bisacodyl 5milliGRAM(s) Oral daily PRN  senna 2Tablet(s) Oral at bedtime PRN  aluminum hydroxide/magnesium hydroxide/simethicone Suspension 30milliLiter(s) Oral every 4 hours PRN  pantoprazole    Tablet 40milliGRAM(s) Oral before breakfast  insulin lispro (HumaLOG) corrective regimen sliding scale  SubCutaneous Before meals and at bedtime  atorvastatin 80milliGRAM(s) Oral at bedtime  dexamethasone     Tablet 4milliGRAM(s) Oral every 6 hours  ticagrelor 60milliGRAM(s) Oral two times a day  sodium chloride 2Gram(s) Oral every 6 hours    PHYSICAL EXAM:  T(C): 36.9, Max: 37.3 (06-06 @ 09:00)  HR: 76 (74 - 88)  BP: 142/68 (115/57 - 155/60)  RR: 16 (14 - 27)  SpO2: 99% (97% - 100%)  Wt(kg): --  I&O's Summary  I & Os for 24h ending 06 Jun 2017 07:00  =============================================  IN: 600 ml / OUT: 200 ml / NET: 400 ml    I & Os for current day (as of 06 Jun 2017 17:52)  =============================================  IN: 1100 ml / OUT: 750 ml / NET: 350 ml        Appearance: Normal	  HEENT:   Normal oral mucosa, PERRL, EOMI	  Lymphatic: No lymphadenopathy  Cardiovascular: Normal S1 S2, No JVD, No murmurs   Respiratory: Lungs clear to auscultation	  Psychiatry: A & O x 3  Gastrointestinal:  Soft, Non-tender, + BS	  Skin: No rashes, No ecchymoses, No cyanosis  Neurologic: Non-focal  Extremities: No clubbing, cyanosis or edema    LABS:	 	                          11.3   5.1   )-----------( 228      ( 06 Jun 2017 08:55 )             33.4     06-06    134<L>  |  99  |  7   ----------------------------<  141<H>  4.8   |  24  |  0.60    Ca    8.4      06 Jun 2017 14:14  Phos  2.5     06-06  Mg     2.2     06-06    TPro  5.9<L>  /  Alb  3.1<L>  /  TBili  x   /  DBili  x   /  AST  x   /  ALT  x   /  AlkPhos  x   06-04    ASSESSMENT/PLAN: 	  32 yo F with Ana Perez dz, seizure d/o, CAD s/p PCI x 3 to pLAD (last one in 3/2016), normal LVEF, chronic angina 2/2 coronary spasms and jailed small LCx by LAD stent going into distal left main, s/p combined direct /indirect L EC-IC bypass on 5/31/17.   1. Restart Cardizem when NS ok with BP parameters. Cont Imdur ER - home dose 60 mg qd   2. Brillinta at 60 mg q12h. No a/c per NS due to inc risk of intracranial bleed. ? role of even low dose ASA in addition to Brillinta since she is ASA resistant.      3. Care per primary team. f/u psych recs

## 2017-06-06 NOTE — BEHAVIORAL HEALTH ASSESSMENT NOTE - NSBHCHARTREVIEWVS_PSY_A_CORE FT
Vital Signs Last 24 Hrs  T(C): 36.9, Max: 37.3 (06-06 @ 09:00)  T(F): 98.5, Max: 99.2 (06-06 @ 09:00)  HR: 76 (74 - 88)  BP: 142/68 (115/57 - 155/60)  BP(mean): 96 (58 - 103)  RR: 16 (14 - 27)  SpO2: 99% (97% - 100%)

## 2017-06-06 NOTE — BEHAVIORAL HEALTH ASSESSMENT NOTE - NSBHADMITCOUNSEL_PSY_A_CORE
importance of adherence to chosen treatment/risks and benefits of treatment options/instructions for management, treatment and follow up/client/family/caregiver education/diagnostic results/impressions and/or recommended studies

## 2017-06-06 NOTE — PROGRESS NOTE ADULT - ATTENDING COMMENTS
PLAN:   NEURO: neurochecks q1h, PRN pain meds with Tylenol, no opiates / benzos while lethargic  s/p bypass:  cont ticagrelor; start lovenox; minocycline x at least 4 days  epilepsy: continue oxcarbazepine 600mg BID  SG TASHI - d/c   REHAB:  no physical therapy for now    EARLY MOB:  HOB 20 degrees; OOB to chair if ok with NS    PULM:  Room air, incentive spirometry  CARDIO:  SBP goal 140-160 mm Hg; continue ranexa, cont ISMN to home dose; off cardizem for now; cont nitrates PRN; continue high dose statin; cardio on board  ENDO:  Blood sugar goals 140-180 mg/dL, continue insulin sliding scale  GI:  docusate senna  DIET: regular diet  RENAL:  d/c fluids  HEM/ONC: Hb stable  VTE Prophylaxis: SCDs, off heparin drip - start lovenox - confirm dose with Dr. Russo  ID: afebrile, no leukocytosis  Social: familiy updated yesterday    Patient at high risk for neurological deterioration or death due to:  stroke, intracranial bleed, delirium, hyperperfusion injury, seizures.  Critical care time, excluding procedures: 45 minutes. PLAN:   NEURO: neurochecks q2h, q4h at nighttime, PRN pain meds with Tylenol, percocet  s/p bypass:  cont ticagrelor;   epilepsy: continue oxcarbazepine 600mg BID, switch decadron to 4mg PO q6h; CT this morning  suicidal ideations- psych consult  REHAB:  PT EARLY MOB:  Ambulate    PULM:  Room air, incentive spirometry  CARDIO:  SBP goal 100-160 mm Hg; continue ranexa, cont ISMN; off cardizem for now; cont nitrates PRN; continue high dose statin; cardio on board  ENDO:  Blood sugar goals 140-180 mg/dL, continue insulin sliding scale  GI:  start PPI for GI prophylaxis; docusate senna  DIET: regular diet  RENAL:  cont 2% @75cc/hr, check BMP  HEM/ONC: Hb stable  VTE Prophylaxis: SCDs, ticagrelor, will hold off on DVT chemoprophylaxis in light of new acute ICH on CT scan  ID: afebrile, no leukocytosis  Social: updated famliy this morning    Patient at high risk for neurological deterioration or death due to:  stroke, intracranial bleed, delirium, hyperperfusion injury, seizures.  Critical care time, excluding procedures: 45 minutes.

## 2017-06-06 NOTE — PROGRESS NOTE ADULT - SUBJECTIVE AND OBJECTIVE BOX
=================================  NEUROCRITICAL CARE ATTENDING NOTE  =================================    STEPHANE NICOLE   MRN-[id number]  Summary:  31F with Hypertension, dyslipidemia, CAD, s/p stents x3, presented with intermittent speech difficulties, HA, seizures, diagnosed with TIAs.  Imaging revealed bilatearl ICA occlusion, Moyamoya pattern.  SPECT / NOVA showed asymmetric flow favoring R, with worsening perfusion on acetazolamide.  Admitted  for elective L direct and indirect bypass; bypass clotted in the OR - given extra doses of ASA, started on heparin drip  Overnight Events: transferred to NSICU overnight for suspicion of chemical meningitis    PAST MEDICAL & SURGICAL HISTORY: TIA (transient ischemic attack): x 4 Moyamoya disease Dyslipidemia CAD (coronary artery disease): Hx Cardiac stent which was re occluded then open Essential hypertension: Hypertension Epilepsy: Epilepsy Coronary stent occlusion Other postprocedural status: S/P   NKDA  Home meds: NTG PRN, ISMN 30mg daily ticagrelor 90mg BID atorvastatin 80mg HS  daily ranexa 1g ER BID cardizem CD daily oxcarbazepine 600mg BID    PHYSICAL EXAMINATION  T(C): , Max: 36.8 (-05 @ 16:46) HR: 88 (74 - 96) BP: 123/78 (115/57 - 159/60) RR: 20 (16 - 20) SpO2: 100% (99% - 100%)  NEUROLOGIC EXAMINATION:  Patient is awake, alert, fully oriented pupils 3mm equal and briskly reactive to light, EOMs intact, moving all 4s 5/5  GENERAL:  not intubated, not in cardiorespiratory distress  EENT: anicteric, periorbital edema / ecchymosis  CARDIOVASC:  (+) S1 S2, normal rate and regular rhythm  PULMONARY:  clear to auscultation bilaterally  ABDOMEN:  soft, nontender, with normoactive bowel sounds  EXTREMITIES:  no edema  SKIN:  no rash    LABS:  CAPILLARY BLOOD GLUCOSE 90 (2017 05:55) 113 (2017 21:42) 101 (2017 16:12) 106 (2017 11:15)                        11.7   4.7   )-----------( 228      ( 2017 07:08 )             34.3     128<L>  |  92<L>  |  13  ----------------------------<  97  4.6   |  26  |  0.80    Ca    8.5      2017 20:30  Phos  3.6     -  Mg     1.7         TPro  5.9<L>  /  Alb  3.1<L>  /  TBili  x   /  DBili  x   /  AST  x   /  ALT  x   /  AlkPhos  x       I & Os for current day (as of  @ 07:57)  IN: 600 ml / OUT: 200 ml / NET: 400 ml    Bacteriology:  CSF studies:  EEG:  Neuroimagin/05:  CT acute ICh superior left frontal lobe (??tiny), stable post-surgical cahnges   CT head: s/p bypass, postsurgical changes, all focal area of acute/subacute ischemia within L frontal lobe;  CT  post-op changes  Other imagin/01 CXR: no acute pathology    MEDICATIONS: docusate bisadocyl senna mod ISS atorvastatin 80mg HS oxcarbazepine 600mg BID ranolazine 1g BID ticagrelor 60 BID ISMN 60 daily maalox salt 2g q6h percocet dexamethasone 4mg IV q6h    IV FLUIDS:   DRIPS:   DIET: regular diet  Lines / Drains: SG TASHI (output:50/24h)    CODE STATUS:  full code                       GOALS OF CARE:  aggressive                      DISPOSITION:  ICU =================================  NEUROCRITICAL CARE ATTENDING NOTE  =================================    STEPHANE NICOLE   MRN-[id number]  Summary:  31F with Hypertension, dyslipidemia, CAD, s/p stents x3, presented with intermittent speech difficulties, HA, seizures, diagnosed with TIAs.  Imaging revealed bilatearl ICA occlusion, Moyamoya pattern.  SPECT / NOVA showed asymmetric flow favoring R, with worsening perfusion on acetazolamide.  Admitted  for elective L direct and indirect bypass; bypass clotted in the OR - given extra doses of ASA, started on heparin drip  Overnight Events: transferred to NSICU overnight for suspicion of chemical meningitis, suicidal ideation - psych called    PAST MEDICAL & SURGICAL HISTORY: TIA (transient ischemic attack): x 4 Moyamoya disease Dyslipidemia CAD (coronary artery disease): Hx Cardiac stent which was re occluded then open Essential hypertension: Hypertension Epilepsy: Epilepsy Coronary stent occlusion Other postprocedural status: S/P   NKDA  Home meds: NTG PRN, ISMN 30mg daily ticagrelor 90mg BID atorvastatin 80mg HS  daily ranexa 1g ER BID cardizem CD daily oxcarbazepine 600mg BID    PHYSICAL EXAMINATION  T(C): , Max: 36.8 (-05 @ 16:46) HR: 88 (74 - 96) BP: 123/78 (115/57 - 159/60) RR: 20 (16 - 20) SpO2: 100% (99% - 100%)  NEUROLOGIC EXAMINATION:  Patient is awake, alert, fully oriented pupils 3mm equal and briskly reactive to light, EOMs intact, moving all 4s 5/5; no meningisimus  GENERAL:  not intubated, not in cardiorespiratory distress  EENT: anicteric, periorbital edema / ecchymosis  CARDIOVASC:  (+) S1 S2, normal rate and regular rhythm  PULMONARY:  clear to auscultation bilaterally  ABDOMEN:  soft, nontender, with normoactive bowel sounds  EXTREMITIES:  no edema  SKIN:  no rash    LABS:  CAPILLARY BLOOD GLUCOSE 90 (2017 05:55) 113 (2017 21:42) 101 (2017 16:12) 106 (2017 11:15)                        11.7   4.7   )-----------( 228      ( 2017 07:08 )             34.3     pending       I & Os for current day (as of  @ 07:57)  IN: 600 ml / OUT: 200 ml / NET: 400 ml    Bacteriology:  CSF studies:  EEG:  Neuroimagin/05:  CT acute ICH superior left frontal lobe (??tiny), stable post-surgical cahnges   CT head: s/p bypass, postsurgical changes, all focal area of acute/subacute ischemia within L frontal lobe;  CT  post-op changes  Other imagin/01 CXR: no acute pathology    MEDICATIONS: docusate bisadocyl senna mod ISS atorvastatin 80mg HS oxcarbazepine 600mg BID ranolazine 1g BID ticagrelor 60 BID ISMN 60 daily maalox salt 2g q6h percocet dexamethasone 4mg IV q6h    IV FLUIDS: 2%@75cc/hr  DRIPS:   DIET: regular diet  Lines / Drains: SG TASHI (output:50/24h)    CODE STATUS:  full code                       GOALS OF CARE:  aggressive                      DISPOSITION:  ICU

## 2017-06-06 NOTE — BEHAVIORAL HEALTH ASSESSMENT NOTE - HPI (INCLUDE ILLNESS QUALITY, SEVERITY, DURATION, TIMING, CONTEXT, MODIFYING FACTORS, ASSOCIATED SIGNS AND SYMPTOMS)
32 yo Tongan speaking  woman with extensive medical history including HTN, dyslipidemia, CAD vs coronary vasospasm S/P PTCA x 3, who recently presented with L hemispheric TIAs (intermittent speech difficulties) as well as H/A and seizures. Vascular imaging (MRA, DSA) revealed bilateral ICA occlusion with multiple sources of collateral flow to bilateral hemispheres (mostly PCA on R side, PCA and transdural MMA collaterals on L side), consistent with severe bilateral moyamoya. Pt endorsed SI to the team prompting psychiatric consult. On exam pt denied past or present SI. She stated that she was feeling "sad" about being in the hospital and was focused on going home. Pt was c/o lights and sounds that are exacerbating her headache. She denied ever having an intent to harm herself and stated that she was feeling frustrated thus endorsed SI. Pt was evasive when asked about depressive symptoms prior to this hospitalization. She was mainly focused on her physical complaints. pt was not interested in psychotherapy referral. Her affect was reactive and there was no evidence of acute danger to self on exam. As per outpatient cardiologist pt has been compliant with her care and medications prior to admission. There was no hx/o SI as per cardiologist in the past

## 2017-06-06 NOTE — PROVIDER CONTACT NOTE (CHANGE IN STATUS NOTIFICATION) - ASSESSMENT
omplains of mouth numbness and cramping with right sided facial droop.  pupils react briskly, full strength upper and lower extremities and pt A&Ox4.  VSS

## 2017-06-06 NOTE — PROGRESS NOTE ADULT - SUBJECTIVE AND OBJECTIVE BOX
HPI:  32 yo woman with extensive medical history including HTN, dyslipidemia, CAD vs coronary vasospasm S/P PTCA x 3, who recently presented with L hemispheric TIAs (intermittent speech difficulties) as well as H/A and seizures. Vascular imaging (MRA, DSA) revealed bilateral ICA occlusion with multiple sources of collateral flow to bilateral hemispheres (mostly PCA on R side, PCA and transdural MMA collaterals on L side), consistent with severe bilateral moyamoya. Perfusion imaging (SPECT, NOVA) showed asymmetric flow favoring R side, which is worse after acetazolamide challenge. Bilateral cerebral revascularization was thus indicated, starting with the more symptomatic L side. Patient is being admitted electively for L direct-indirect EC-IC bypass. (31 May 2017 15:12)    OVERNIGHT EVENTS: Patient feels very depressed today, could not sleep due to her stepdown neighbor. Complaining of headaches and some neck stiffness but no neck pain. Reports of suicidal ideation this morning but without plan.     Vital Signs Last 24 Hrs  T(C): 37.3, Max: 37.3 ( @ 09:00)  T(F): 99.2, Max: 99.2 ( @ 09:00)  HR: 78 (74 - 96)  BP: 140/64 (115/57 - 159/60)  BP(mean): 89 (81 - 115)  RR: 14 (14 - 20)  SpO2: 97% (97% - 100%)    I&O's Detail  I & Os for 24h ending 2017 07:00  =============================================  IN:    sodium chloride 2%: 600 ml    Total IN: 600 ml  ---------------------------------------------  OUT:    Voided: 200 ml    Total OUT: 200 ml  ---------------------------------------------  Total NET: 400 ml    I & Os for current day (as of 2017 10:26)  =============================================  IN:    sodium chloride 2%: 150 ml    Total IN: 150 ml  ---------------------------------------------  OUT:    Total OUT: 0 ml  ---------------------------------------------  Total NET: 150 ml    I&O's Summary  I & Os for 24h ending 2017 07:00  =============================================  IN: 600 ml / OUT: 200 ml / NET: 400 ml    I & Os for current day (as of 2017 10:26)  =============================================  IN: 150 ml / OUT: 0 ml / NET: 150 ml      PHYSICAL EXAM:  Gen: NAD, AAOx3. Sinhala speaking.  HEENT: PERRL. EOMI. Left scalp incision C/D/I.  Neck: Soft, nontender. Decreased ROM to right. Negative Brudzinskis.  Lungs: Clear b/l  Heart: S1, S2. NSR.  Abd: Soft, NT/ND. +BS.  Exts: Pulses 2+ throughout. FROM. Negative Kernig's.  Neuro: CNs II-XII. 5/5 str x4 extremities. Sensation intact throughout. Following commands.    TUBES/LINES:  [] CVC  [] A-line  [] Lumbar Drain  [] Ventriculostomy  [] Other    DIET:  [] NPO  [x] Mechanical  [] Tube feeds    LABS:                        11.3   5.1   )-----------( 228      ( 2017 08:55 )             33.4     06-    136  |  101  |  9   ----------------------------<  104<H>  5.3   |  25  |  0.70    Ca    8.6      2017 08:55  Phos  2.5     06-06  Mg     2.2     -06    TPro  5.9<L>  /  Alb  3.1<L>  /  TBili  x   /  DBili  x   /  AST  x   /  ALT  x   /  AlkPhos  x   06-04    PT/INR - ( 2017 08:55 )   PT: 11.3 sec;   INR: 1.02          PTT - ( 2017 08:55 )  PTT:31.3 sec  Urinalysis Basic - ( 2017 15:54 )    Color: Yellow / Appearance: Clear / S.010 / pH: x  Gluc: x / Ketone: NEGATIVE  / Bili: NEGATIVE / Urobili: 0.2 E.U./dL   Blood: x / Protein: NEGATIVE mg/dL / Nitrite: NEGATIVE   Leuk Esterase: NEGATIVE / RBC: Many /HPF / WBC < 5 /HPF   Sq Epi: x / Non Sq Epi: Few /HPF / Bacteria: Present /HPF          CAPILLARY BLOOD GLUCOSE  90 (2017 05:55)  113 (2017 21:42)  101 (2017 16:12)  106 (2017 11:15)      Drug Levels: [] N/A    CSF Analysis: [] N/A      Allergies    No Known Allergies    Intolerances      MEDICATIONS:  Antibiotics:    Neuro:  acetaminophen   Tablet. 325milliGRAM(s) Oral every 4 hours PRN  acetaminophen   Tablet. 650milliGRAM(s) Oral every 6 hours PRN  ondansetron Injectable 4milliGRAM(s) IV Push every 6 hours PRN  OXcarbazepine 600milliGRAM(s) Oral two times a day  oxyCODONE  5 mG/acetaminophen 325 mG 1Tablet(s) Oral every 4 hours PRN    Anticoagulation:  ticagrelor 60milliGRAM(s) Oral two times a day    OTHER:  docusate sodium 100milliGRAM(s) Oral three times a day  bisacodyl 5milliGRAM(s) Oral daily PRN  senna 2Tablet(s) Oral at bedtime PRN  insulin lispro (HumaLOG) corrective regimen sliding scale  SubCutaneous Before meals and at bedtime  atorvastatin 80milliGRAM(s) Oral at bedtime  ranolazine 1000milliGRAM(s) Oral two times a day  isosorbide   mononitrate ER Tablet (IMDUR) 60milliGRAM(s) Oral daily  aluminum hydroxide/magnesium hydroxide/simethicone Suspension 30milliLiter(s) Oral every 4 hours PRN  dexamethasone     Tablet 4milliGRAM(s) Oral every 6 hours  pantoprazole    Tablet 40milliGRAM(s) Oral before breakfast    IVF:  sodium chloride 2Gram(s) Oral every 6 hours  sodium chloride 2% . 1000milliLiter(s) IV Continuous <Continuous>    CULTURES:    RADIOLOGY & ADDITIONAL TESTS:      ASSESSMENT:  31y Female s/p left STA-MCA bypass POD#5, s/p cerebral angiogram POD#6.    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  CT head this morning  Continue pain meds PRN  Psyche consult this AM for suicidal ideation    CARDIOVASCULAR: SBP goal 100-160  On NS@50cc for hyponatremia, now improving. Repeat BMP this PM. On NaCl tabs.  Daily labs.    PULMONARY: Satting well on RA    RENAL: voiding    GI: PO diet, PPI, stool softeners    HEME: SCDs, Brillanta, will f/u timing to restart chemoppx.    ID: Afebrile,    ENDO: ISS    DISPOSITION: Continue SICU care today, possible SDU later  Concern for chemical meningitis, will follow  D/w Dr. Long, Dr. Rubio

## 2017-06-07 LAB
ANION GAP SERPL CALC-SCNC: 10 MMOL/L — SIGNIFICANT CHANGE UP (ref 5–17)
ANION GAP SERPL CALC-SCNC: 14 MMOL/L — SIGNIFICANT CHANGE UP (ref 5–17)
ANION GAP SERPL CALC-SCNC: 15 MMOL/L — SIGNIFICANT CHANGE UP (ref 5–17)
BUN SERPL-MCNC: 6 MG/DL — LOW (ref 7–23)
BUN SERPL-MCNC: 7 MG/DL — SIGNIFICANT CHANGE UP (ref 7–23)
BUN SERPL-MCNC: 7 MG/DL — SIGNIFICANT CHANGE UP (ref 7–23)
CALCIUM SERPL-MCNC: 8.5 MG/DL — SIGNIFICANT CHANGE UP (ref 8.4–10.5)
CALCIUM SERPL-MCNC: 8.6 MG/DL — SIGNIFICANT CHANGE UP (ref 8.4–10.5)
CALCIUM SERPL-MCNC: 9 MG/DL — SIGNIFICANT CHANGE UP (ref 8.4–10.5)
CHLORIDE SERPL-SCNC: 100 MMOL/L — SIGNIFICANT CHANGE UP (ref 96–108)
CHLORIDE SERPL-SCNC: 102 MMOL/L — SIGNIFICANT CHANGE UP (ref 96–108)
CHLORIDE SERPL-SCNC: 103 MMOL/L — SIGNIFICANT CHANGE UP (ref 96–108)
CO2 SERPL-SCNC: 20 MMOL/L — LOW (ref 22–31)
CO2 SERPL-SCNC: 21 MMOL/L — LOW (ref 22–31)
CO2 SERPL-SCNC: 23 MMOL/L — SIGNIFICANT CHANGE UP (ref 22–31)
CREAT SERPL-MCNC: 0.5 MG/DL — SIGNIFICANT CHANGE UP (ref 0.5–1.3)
CREAT SERPL-MCNC: 0.5 MG/DL — SIGNIFICANT CHANGE UP (ref 0.5–1.3)
CREAT SERPL-MCNC: 0.6 MG/DL — SIGNIFICANT CHANGE UP (ref 0.5–1.3)
GLUCOSE SERPL-MCNC: 112 MG/DL — HIGH (ref 70–99)
GLUCOSE SERPL-MCNC: 115 MG/DL — HIGH (ref 70–99)
GLUCOSE SERPL-MCNC: 136 MG/DL — HIGH (ref 70–99)
HCG SERPL-ACNC: <.1 MIU/ML — SIGNIFICANT CHANGE UP
HCG UR QL: NEGATIVE — SIGNIFICANT CHANGE UP
HCT VFR BLD CALC: 34.4 % — LOW (ref 34.5–45)
HGB BLD-MCNC: 11.6 G/DL — SIGNIFICANT CHANGE UP (ref 11.5–15.5)
MAGNESIUM SERPL-MCNC: 2.1 MG/DL — SIGNIFICANT CHANGE UP (ref 1.6–2.6)
MCHC RBC-ENTMCNC: 28.3 PG — SIGNIFICANT CHANGE UP (ref 27–34)
MCHC RBC-ENTMCNC: 33.7 G/DL — SIGNIFICANT CHANGE UP (ref 32–36)
MCV RBC AUTO: 83.9 FL — SIGNIFICANT CHANGE UP (ref 80–100)
PAI AG PPP IA-MCNC: 30.4 NG/ML — SIGNIFICANT CHANGE UP
PHOSPHATE SERPL-MCNC: 2.2 MG/DL — LOW (ref 2.5–4.5)
PLATELET # BLD AUTO: 253 K/UL — SIGNIFICANT CHANGE UP (ref 150–400)
POTASSIUM SERPL-MCNC: 4.3 MMOL/L — SIGNIFICANT CHANGE UP (ref 3.5–5.3)
POTASSIUM SERPL-MCNC: 4.6 MMOL/L — SIGNIFICANT CHANGE UP (ref 3.5–5.3)
POTASSIUM SERPL-MCNC: 5.3 MMOL/L — SIGNIFICANT CHANGE UP (ref 3.5–5.3)
POTASSIUM SERPL-SCNC: 4.3 MMOL/L — SIGNIFICANT CHANGE UP (ref 3.5–5.3)
POTASSIUM SERPL-SCNC: 4.6 MMOL/L — SIGNIFICANT CHANGE UP (ref 3.5–5.3)
POTASSIUM SERPL-SCNC: 5.3 MMOL/L — SIGNIFICANT CHANGE UP (ref 3.5–5.3)
RBC # BLD: 4.1 M/UL — SIGNIFICANT CHANGE UP (ref 3.8–5.2)
RBC # FLD: 15.7 % — SIGNIFICANT CHANGE UP (ref 10.3–16.9)
SODIUM SERPL-SCNC: 135 MMOL/L — SIGNIFICANT CHANGE UP (ref 135–145)
SODIUM SERPL-SCNC: 135 MMOL/L — SIGNIFICANT CHANGE UP (ref 135–145)
SODIUM SERPL-SCNC: 138 MMOL/L — SIGNIFICANT CHANGE UP (ref 135–145)
WBC # BLD: 8.6 K/UL — SIGNIFICANT CHANGE UP (ref 3.8–10.5)
WBC # FLD AUTO: 8.6 K/UL — SIGNIFICANT CHANGE UP (ref 3.8–10.5)

## 2017-06-07 PROCEDURE — 36223 PLACE CATH CAROTID/INOM ART: CPT | Mod: LT

## 2017-06-07 PROCEDURE — 99291 CRITICAL CARE FIRST HOUR: CPT | Mod: 24

## 2017-06-07 PROCEDURE — 36226 PLACE CATH VERTEBRAL ART: CPT | Mod: LT

## 2017-06-07 RX ORDER — FLUDROCORTISONE ACETATE 0.1 MG/1
0.1 TABLET ORAL DAILY
Qty: 0 | Refills: 0 | Status: DISCONTINUED | OUTPATIENT
Start: 2017-06-07 | End: 2017-06-08

## 2017-06-07 RX ORDER — SODIUM,POTASSIUM PHOSPHATES 278-250MG
1 POWDER IN PACKET (EA) ORAL
Qty: 0 | Refills: 0 | Status: COMPLETED | OUTPATIENT
Start: 2017-06-07 | End: 2017-06-08

## 2017-06-07 RX ORDER — DEXAMETHASONE 0.5 MG/5ML
4 ELIXIR ORAL EVERY 8 HOURS
Qty: 0 | Refills: 0 | Status: DISCONTINUED | OUTPATIENT
Start: 2017-06-07 | End: 2017-06-08

## 2017-06-07 RX ORDER — HEPARIN SODIUM 5000 [USP'U]/ML
5000 INJECTION INTRAVENOUS; SUBCUTANEOUS EVERY 8 HOURS
Qty: 0 | Refills: 0 | Status: DISCONTINUED | OUTPATIENT
Start: 2017-06-07 | End: 2017-06-08

## 2017-06-07 RX ORDER — SODIUM CHLORIDE 5 G/100ML
1000 INJECTION, SOLUTION INTRAVENOUS
Qty: 0 | Refills: 0 | Status: DISCONTINUED | OUTPATIENT
Start: 2017-06-07 | End: 2017-06-07

## 2017-06-07 RX ORDER — NITROGLYCERIN 6.5 MG
0.4 CAPSULE, EXTENDED RELEASE ORAL
Qty: 0 | Refills: 0 | Status: DISCONTINUED | OUTPATIENT
Start: 2017-06-07 | End: 2017-06-08

## 2017-06-07 RX ADMIN — SODIUM CHLORIDE 2 GRAM(S): 9 INJECTION INTRAMUSCULAR; INTRAVENOUS; SUBCUTANEOUS at 14:28

## 2017-06-07 RX ADMIN — RANOLAZINE 1000 MILLIGRAM(S): 500 TABLET, FILM COATED, EXTENDED RELEASE ORAL at 17:37

## 2017-06-07 RX ADMIN — Medication 1 TABLET(S): at 17:37

## 2017-06-07 RX ADMIN — TICAGRELOR 60 MILLIGRAM(S): 90 TABLET ORAL at 07:03

## 2017-06-07 RX ADMIN — SODIUM CHLORIDE 2 GRAM(S): 9 INJECTION INTRAMUSCULAR; INTRAVENOUS; SUBCUTANEOUS at 00:46

## 2017-06-07 RX ADMIN — Medication 4 MILLIGRAM(S): at 07:02

## 2017-06-07 RX ADMIN — Medication 100 MILLIGRAM(S): at 22:20

## 2017-06-07 RX ADMIN — Medication 4 MILLIGRAM(S): at 00:46

## 2017-06-07 RX ADMIN — RANOLAZINE 1000 MILLIGRAM(S): 500 TABLET, FILM COATED, EXTENDED RELEASE ORAL at 07:03

## 2017-06-07 RX ADMIN — OXCARBAZEPINE 600 MILLIGRAM(S): 300 TABLET, FILM COATED ORAL at 07:02

## 2017-06-07 RX ADMIN — SODIUM CHLORIDE 2 GRAM(S): 9 INJECTION INTRAMUSCULAR; INTRAVENOUS; SUBCUTANEOUS at 17:36

## 2017-06-07 RX ADMIN — Medication 1 TABLET(S): at 22:22

## 2017-06-07 RX ADMIN — HEPARIN SODIUM 5000 UNIT(S): 5000 INJECTION INTRAVENOUS; SUBCUTANEOUS at 22:20

## 2017-06-07 RX ADMIN — ATORVASTATIN CALCIUM 80 MILLIGRAM(S): 80 TABLET, FILM COATED ORAL at 22:20

## 2017-06-07 RX ADMIN — PANTOPRAZOLE SODIUM 40 MILLIGRAM(S): 20 TABLET, DELAYED RELEASE ORAL at 14:29

## 2017-06-07 RX ADMIN — Medication 4 MILLIGRAM(S): at 22:20

## 2017-06-07 RX ADMIN — Medication 0.4 MILLIGRAM(S): at 15:00

## 2017-06-07 RX ADMIN — Medication 1 TABLET(S): at 15:52

## 2017-06-07 RX ADMIN — ISOSORBIDE MONONITRATE 60 MILLIGRAM(S): 60 TABLET, EXTENDED RELEASE ORAL at 14:29

## 2017-06-07 RX ADMIN — TICAGRELOR 60 MILLIGRAM(S): 90 TABLET ORAL at 17:37

## 2017-06-07 RX ADMIN — Medication 4 MILLIGRAM(S): at 14:28

## 2017-06-07 RX ADMIN — FLUDROCORTISONE ACETATE 0.1 MILLIGRAM(S): 0.1 TABLET ORAL at 14:29

## 2017-06-07 RX ADMIN — OXCARBAZEPINE 600 MILLIGRAM(S): 300 TABLET, FILM COATED ORAL at 17:38

## 2017-06-07 RX ADMIN — Medication 100 MILLIGRAM(S): at 14:33

## 2017-06-07 NOTE — PROGRESS NOTE ADULT - SUBJECTIVE AND OBJECTIVE BOX
HPI:  30 yo woman with extensive medical history including HTN, dyslipidemia, CAD vs coronary vasospasm S/P PTCA x 3, who recently presented with L hemispheric TIAs (intermittent speech difficulties) as well as H/A and seizures. Vascular imaging (MRA, DSA) revealed bilateral ICA occlusion with multiple sources of collateral flow to bilateral hemispheres (mostly PCA on R side, PCA and transdural MMA collaterals on L side), consistent with severe bilateral moyamoya. Perfusion imaging (SPECT, NOVA) showed asymmetric flow favoring R side, which is worse after acetazolamide challenge. Bilateral cerebral revascularization was thus indicated, starting with the more symptomatic L side. Patient is being admitted electively for L direct-indirect EC-IC bypass. (31 May 2017 15:12)    OVERNIGHT EVENTS:  Right sided facial droop overnight, CTH performed- stable. Went for angio today. Angio showed direct bypass graft occluded, indirect graft open, has collateral. Pt seen and examined at bedside post-angio. c/o chest pain similar previous episodes- given nitro    Vital Signs Last 24 Hrs  T(C): 36.6, Max: 37.2 (06-06 @ 17:45)  T(F): 97.9, Max: 98.9 (06-06 @ 17:45)  HR: 78 (70 - 88)  BP: 115/63 (110/56 - 150/78)  BP(mean): 85 (77 - 107)  RR: 15 (13 - 31)  SpO2: 99% (98% - 100%)    I&O's Detail  I & Os for 24h ending 2017 07:00  =============================================  IN:    Oral Fluid: 800 ml    sodium chloride 0.9%: 450 ml    sodium chloride 2%: 450 ml    sodium chloride 2%: 150 ml    sodium chloride 2%: 75 ml    Total IN: 1925 ml  ---------------------------------------------  OUT:    Voided: 1650 ml    Total OUT: 1650 ml  ---------------------------------------------  Total NET: 275 ml    I & Os for current day (as of 2017 14:45)  =============================================  IN:    sodium chloride 0.9%: 100 ml    sodium chloride 2% .: 50 ml    Total IN: 150 ml  ---------------------------------------------  OUT:    Voided: 300 ml    Total OUT: 300 ml  ---------------------------------------------  Total NET: -150 ml    I&O's Summary  I & Os for 24h ending 2017 07:00  =============================================  IN: 1925 ml / OUT: 1650 ml / NET: 275 ml    I & Os for current day (as of 2017 14:45)  =============================================  IN: 150 ml / OUT: 300 ml / NET: -150 ml      PHYSICAL EXAM:  Neurological:  AAOx3, speech coherent, FC  PERRL, EOMI, mild R facial droop, tongue midline  MAEx4, strength 5/5 UE and LE b/l, no drift  SILT throughout b/l  Extremities: DP and PT pulses 2+ in LE b/l  Incision/wound: groin incision clean, dry and intact; dressing in place     TUBES/LINES:  [] CVC  [] A-line  [] Lumbar Drain  [] Ventriculostomy  [] Other    DIET:  [x] NPO  [] Mechanical  [] Tube feeds    LABS:                        11.6   8.6   )-----------( 253      ( 2017 08:17 )             34.4     06-07    135  |  100  |  7   ----------------------------<  112<H>  4.3   |  20<L>  |  0.60    Ca    9.0      2017 08:17  Phos  2.2       Mg     2.1     06-07      PT/INR - ( 2017 08:55 )   PT: 11.3 sec;   INR: 1.02          PTT - ( 2017 08:55 )  PTT:31.3 sec  Urinalysis Basic - ( 2017 15:54 )    Color: Yellow / Appearance: Clear / S.010 / pH: x  Gluc: x / Ketone: NEGATIVE  / Bili: NEGATIVE / Urobili: 0.2 E.U./dL   Blood: x / Protein: NEGATIVE mg/dL / Nitrite: NEGATIVE   Leuk Esterase: NEGATIVE / RBC: Many /HPF / WBC < 5 /HPF   Sq Epi: x / Non Sq Epi: Few /HPF / Bacteria: Present /HPF          CAPILLARY BLOOD GLUCOSE  123 (2017 06:00)  102 (2017 16:00)      Drug Levels: [] N/A    CSF Analysis: [] N/A      Allergies    No Known Allergies    Intolerances      MEDICATIONS:  Antibiotics:    Neuro:  acetaminophen   Tablet. 325milliGRAM(s) Oral every 4 hours PRN  acetaminophen   Tablet. 650milliGRAM(s) Oral every 6 hours PRN  ondansetron Injectable 4milliGRAM(s) IV Push every 6 hours PRN  OXcarbazepine 600milliGRAM(s) Oral two times a day  oxyCODONE  5 mG/acetaminophen 325 mG 1Tablet(s) Oral every 4 hours PRN    Anticoagulation:  ticagrelor 60milliGRAM(s) Oral two times a day    OTHER:  docusate sodium 100milliGRAM(s) Oral three times a day  bisacodyl 5milliGRAM(s) Oral daily PRN  senna 2Tablet(s) Oral at bedtime PRN  insulin lispro (HumaLOG) corrective regimen sliding scale  SubCutaneous Before meals and at bedtime  atorvastatin 80milliGRAM(s) Oral at bedtime  ranolazine 1000milliGRAM(s) Oral two times a day  isosorbide   mononitrate ER Tablet (IMDUR) 60milliGRAM(s) Oral daily  aluminum hydroxide/magnesium hydroxide/simethicone Suspension 30milliLiter(s) Oral every 4 hours PRN  pantoprazole    Tablet 40milliGRAM(s) Oral before breakfast  dexamethasone     Tablet 4milliGRAM(s) Oral every 8 hours  fludroCORTISONE 0.1milliGRAM(s) Oral daily  nitroglycerin     SubLingual 0.4milliGRAM(s) SubLingual every 5 minutes PRN    IVF:  sodium chloride 2Gram(s) Oral every 6 hours  potassium acid phosphate/sodium acid phosphate tablet (K-PHOS No. 2) 1Tablet(s) Oral four times a day with meals  sodium chloride 2% . 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, s/p direct and indirect L EC-IC bypass, s/p cerebral angiogram today    PLAN:  NEURO:  -neuro checks q1hr  -continue 2%@50cc/hr, f/u BMP this afternoon   -continue salt tabs  -continue Brilinta  -continue oxcarbazapine  -taper decadron to 4q8  -HOB flat x 4hrs  -pulse checks/groin checks  -remove groin dressing in am    CARDIOVASCULAR:  --160   -nitro prn for chest pain    PULMONARY:  -room air  -IS    RENAL:  -2%@50cc/hr  -f/u BMP this afternoon  -fludo 0.1mg daily    GI:  -NPO x 4 hours  -regular diet after 4 hours, s/p pass bedside s/s  -senna/colace    HEME:  -stable    ID:  -afebrile    ENDO:  -ISS    DVT PROPHYLAXIS:  [x] Venodynes                                [] Heparin/Lovenox    -d/w Dr. Long and Dr. Rubio

## 2017-06-07 NOTE — PROGRESS NOTE ADULT - SUBJECTIVE AND OBJECTIVE BOX
=================================  NEUROCRITICAL CARE ATTENDING NOTE  =================================    STEPHANE NICOLE   MRN-[id number]  Summary:  31F with Hypertension, dyslipidemia, CAD, s/p stents x3, presented with intermittent speech difficulties, HA, seizures, diagnosed with TIAs.  Imaging revealed bilatearl ICA occlusion, Moyamoya pattern.  SPECT / NOVA showed asymmetric flow favoring R, with worsening perfusion on acetazolamide.  Admitted  for elective L direct and indirect bypass; bypass clotted in the OR - given extra doses of ASA, started on heparin drip.  transferred to NSICU for suspicion of chemical meningitis  Overnight Events: facial droop overnight, repeat CT NEG    PAST MEDICAL & SURGICAL HISTORY: TIA (transient ischemic attack): x 4 Moyamoya disease Dyslipidemia CAD (coronary artery disease): Hx Cardiac stent which was re occluded then open Essential hypertension: Hypertension Epilepsy: Epilepsy Coronary stent occlusion Other postprocedural status: S/P   NKDA  Home meds: NTG PRN, ISMN 30mg daily ticagrelor 90mg BID atorvastatin 80mg HS  daily ranexa 1g ER BID cardizem CD daily oxcarbazepine 600mg BID    PHYSICAL EXAMINATION  T(C): , Max: 37.3 (- @ 09:00) HR: 74 (70 - 88) BP: 145/75 (110/56 - 150/78) RR: 15 (14 - 31) SpO2: 99% (97% - 100%)  NEUROLOGIC EXAMINATION:  Patient is awake, alert, fully oriented pupils 3mm equal and briskly reactive to light, EOMs intact, moving all 4s 5/5; no meningisimus  GENERAL:  not intubated, not in cardiorespiratory distress  EENT: anicteric, periorbital edema / ecchymosis  CARDIOVASC:  (+) S1 S2, normal rate and regular rhythm  PULMONARY:  clear to auscultation bilaterally  ABDOMEN:  soft, nontender, with normoactive bowel sounds  EXTREMITIES:  no edema  SKIN:  no rash    LABS:  CAPILLARY BLOOD GLUCOSE 123 (2017 06:00) 102 (2017 16:00) 121 (2017 11:00)                        11.3   5.1   )-----------( 228      ( 2017 08:55 )             33.4     135  |  102  |  7   ----------------------------<  136<H>  4.6   |  23  |  0.50    Ca    8.6      2017 02:21  Phos  2.5       Mg     2.2         I & Os for current day (as of  @ 07:58)  IN: 1925 ml / OUT: 1350 ml / NET: 575 ml    Bacteriology:  CSF studies:  EEG:  Neuroimagin/06 CT head:  post surgical changes, stable SAH / ICH, stable hypodensity L FPlobe :  CT acute ICH superior left frontal lobe (??tiny), stable post-surgical cahnges   CT head: s/p bypass, postsurgical changes, all focal area of acute/subacute ischemia within L frontal lobe;  CT  post-op changes  Other imagin/06 Doppler:  NEG    CXR: no acute pathology    MEDICATIONS: docusate bisadocyl senna mod ISS atorvastatin 80mg HS oxcarbazepine 600mg BID ranolazine 1g BID ticagrelor 60 BID ISMN 60 daily maalox salt 2g q6h percocet dexamethasone 4mg PO q6h pantoprazole 40    IV FLUIDS: 2%@75cc/hr  DRIPS:   DIET: regular diet  Lines / Drains: SG TASHI (output:50/24h)    CODE STATUS:  full code                       GOALS OF CARE:  aggressive                      DISPOSITION:  ICU =================================  NEUROCRITICAL CARE ATTENDING NOTE  =================================    STEPHANE NICOLE   MRN-[id number]  Summary:  31F with Hypertension, dyslipidemia, CAD, s/p stents x3, presented with intermittent speech difficulties, HA, seizures, diagnosed with TIAs.  Imaging revealed bilatearl ICA occlusion, Moyamoya pattern.  SPECT / NOVA showed asymmetric flow favoring R, with worsening perfusion on acetazolamide.  Admitted  for elective L direct and indirect bypass; bypass clotted in the OR - given extra doses of ASA, started on heparin drip.  transferred to NSICU for suspicion of chemical meningitis  Overnight Events: facial droop overnight, repeat CT NEG    PAST MEDICAL & SURGICAL HISTORY: TIA (transient ischemic attack): x 4 Moyamoya disease Dyslipidemia CAD (coronary artery disease): Hx Cardiac stent which was re occluded then open Essential hypertension: Hypertension Epilepsy: Epilepsy Coronary stent occlusion Other postprocedural status: S/P   NKDA  Home meds: NTG PRN, ISMN 30mg daily ticagrelor 90mg BID atorvastatin 80mg HS  daily ranexa 1g ER BID cardizem CD daily oxcarbazepine 600mg BID    PHYSICAL EXAMINATION  T(C): , Max: 37.3 (06-06 @ 09:00) HR: 74 (70 - 88) BP: 145/75 (110/56 - 150/78) RR: 15 (14 - 31) SpO2: 99% (97% - 100%)  NEUROLOGIC EXAMINATION:  Patient is awake, alert, fully oriented pupils 3mm equal and briskly reactive to light, EOMs intact, (+) facial droop R; moving all 4s 5/5; no meningisimus  GENERAL:  not intubated, not in cardiorespiratory distress  EENT: anicteric, periorbital edema / ecchymosis  CARDIOVASC:  (+) S1 S2, normal rate and regular rhythm  PULMONARY:  clear to auscultation bilaterally  ABDOMEN:  soft, nontender, with normoactive bowel sounds  EXTREMITIES:  no edema, no groin hematoma, good distal pulses  SKIN:  no rash    LABS:  CAPILLARY BLOOD GLUCOSE 123 (2017 06:00) 102 (2017 16:00) 121 (2017 11:00)                        11.3   5.1   )-----------( 228      ( 2017 08:55 )             33.4     135  |  102  |  7   ----------------------------<  136<H>  4.6   |  23  |  0.50    Ca    8.6      2017 02:21  Phos  2.5       Mg     2.2         I & Os for current day (as of  @ 07:58)  IN: 1925 ml / OUT: 1350 ml / NET: 575 ml    Bacteriology:  CSF studies:  EEG:  Neuroimagin/06 CT head:  post surgical changes, stable SAH / ICH, stable hypodensity L FPlobe :  CT acute ICH superior left frontal lobe (??tiny), stable post-surgical cahnges   CT head: s/p bypass, postsurgical changes, all focal area of acute/subacute ischemia within L frontal lobe;  CT  post-op changes  Other imagin/06 Doppler:  NEG    CXR: no acute pathology    MEDICATIONS: docusate bisadocyl senna mod ISS atorvastatin 80mg HS oxcarbazepine 600mg BID ranolazine 1g BID ticagrelor 60 BID ISMN 60 daily maalox salt 2g q6h percocet dexamethasone 4mg PO q6h pantoprazole 40    IV FLUIDS: 2%@50cc/hr  DRIPS:   DIET: NPO  Lines / Drains:     CODE STATUS:  full code                       GOALS OF CARE:  aggressive                      DISPOSITION:  ICU

## 2017-06-07 NOTE — PROGRESS NOTE ADULT - SUBJECTIVE AND OBJECTIVE BOX
Surgery: femoral cerebral angio  Consent: Signed by patient     No Known Allergies      OVERNIGHT EVENTS:  hyponatremic, stable on 2%   concern for graft patency  otherwise no acute evnets    T(C): 36.6, Max: 37.3 (- @ 09:00)  HR: 74 (70 - 88)  BP: 110/56 (110/56 - 150/78)  RR: 18 (14 - 31)  SpO2: 98% (97% - 100%)  Wt(kg): --    EXAM:  A&O x3   EOMI, PERRL  incison c/d/i  PRATER x 4 no focal deficits        135  |  102  |  7   ----------------------------<  136<H>  4.6   |  23  |  0.50    Ca    8.6      2017 02:21  Phos  2.5     06-  Mg     2.2     06-06      CBC Full  -  ( 2017 08:55 )  WBC Count : 5.1 K/uL  Hemoglobin : 11.3 g/dL  Hematocrit : 33.4 %  Platelet Count - Automated : 228 K/uL  Mean Cell Volume : 83.3 fL  Mean Cell Hemoglobin : 28.2 pg  Mean Cell Hemoglobin Concentration : 33.8 g/dL  Auto Neutrophil # : x  Auto Lymphocyte # : x  Auto Monocyte # : x  Auto Eosinophil # : x  Auto Basophil # : x  Auto Neutrophil % : x  Auto Lymphocyte % : x  Auto Monocyte % : x  Auto Eosinophil % : x  Auto Basophil % : x    PT/INR - ( 2017 08:55 )   PT: 11.3 sec;   INR: 1.02          PTT - ( 2017 08:55 )  PTT:31.3 sec    Pregnancy test:  Type & Screen (in past 72hrs):    CXR:  Mild prominence of the pulmonary vascularity which may be   secondary to volume overload or may represent mild pulmonary venous   congestion.  EKNormal sinus rhythm  ECHO: ef 60 wnl    Last dose of antiplatelet/anticoagulation drug: brilinta 6/6    Implanted Devices (pacemaker, drug pump...etc):  []YES   [x] NO                  If yes --> EPS consulted to interrogate/adjust device:                 Assessment: 30 y/o female with sandra sandra s/p bypass     Plan:  angio in am  d/w Dr. Long

## 2017-06-07 NOTE — PROGRESS NOTE ADULT - ATTENDING COMMENTS
PLAN:   NEURO: neurochecks q2h, q4h at nighttime, PRN pain meds with Tylenol, percocet  s/p bypass:  cont ticagrelor; repeat angio today to determine graft patency  epilepsy: continue oxcarbazepine 600mg BID, switch decadron to 4mg PO q6h  suicidal ideations- psych consult  REHAB:  PT EARLY MOB:  Ambulate    PULM:  Room air, incentive spirometry  CARDIO:  SBP goal 100-160 mm Hg; continue ranexa, cont ISMN; off cardizem for now; cont nitrates PRN; continue high dose statin; cardio on board  ENDO:  Blood sugar goals 140-180 mg/dL, continue insulin sliding scale  GI:  start PPI for GI prophylaxis; docusate senna  DIET: regular diet  RENAL:  cont 2% @75cc/hr, check BMP  HEM/ONC: Hb stable  VTE Prophylaxis: SCDs, ticagrelor, will hold off on DVT chemoprophylaxis in light of new acute ICH on CT scan  ID: afebrile, no leukocytosis  Social: updated famliy this morning    Patient at high risk for neurological deterioration or death due to:  stroke, intracranial bleed, delirium, hyperperfusion injury, seizures.  Critical care time, excluding procedures: 45 minutes. PLAN:   NEURO: neurochecks q1h; PRN pain meds with Tylenol, no opiates  s/p bypass:  cont ticagrelor; angio to determine graft patency  epilepsy: continue oxcarbazepine 600mg BID, switch decadron to 4mg PO q6h  adjustment disorder - supportive therapy  REHAB:  PT EARLY MOB:  flat x 4 hours, then OOB to chair    PULM:  Room air, incentive spirometry  CARDIO:  SBP goal 120-160 mm Hg; continue ranexa, cont ISMN; off cardizem for now; cont nitrates PRN; continue high dose statin; cardio on board  ENDO:  Blood sugar goals 140-180 mg/dL, continue insulin sliding scale  GI:  PPI for GI prophylaxis; docusate senna  DIET: regular diet once more awake and after bedside swallow  RENAL:  cont 2%@50cc/hr, check BMP this PM, start fludro 0.1 mg daily  HEM/ONC: Hb stable  VTE Prophylaxis: SCDs, ticagrelor  ID: afebrile, no leukocytosis  Social: updated famliy this morning    Patient at high risk for neurological deterioration or death due to:  stroke, intracranial bleed, delirium, hyperperfusion injury, seizures.  Critical care time, excluding procedures: 45 minutes.

## 2017-06-07 NOTE — BRIEF OPERATIVE NOTE - POST-OP DX
Davide  05/31/2017    Active  Sam Salazar

## 2017-06-08 VITALS — TEMPERATURE: 98 F

## 2017-06-08 LAB
ANION GAP SERPL CALC-SCNC: 10 MMOL/L — SIGNIFICANT CHANGE UP (ref 5–17)
BUN SERPL-MCNC: 12 MG/DL — SIGNIFICANT CHANGE UP (ref 7–23)
CALCIUM SERPL-MCNC: 8.8 MG/DL — SIGNIFICANT CHANGE UP (ref 8.4–10.5)
CHLORIDE SERPL-SCNC: 101 MMOL/L — SIGNIFICANT CHANGE UP (ref 96–108)
CO2 SERPL-SCNC: 25 MMOL/L — SIGNIFICANT CHANGE UP (ref 22–31)
CREAT SERPL-MCNC: 0.6 MG/DL — SIGNIFICANT CHANGE UP (ref 0.5–1.3)
GLUCOSE SERPL-MCNC: 135 MG/DL — HIGH (ref 70–99)
HCT VFR BLD CALC: 31.9 % — LOW (ref 34.5–45)
HGB BLD-MCNC: 10.9 G/DL — LOW (ref 11.5–15.5)
MAGNESIUM SERPL-MCNC: 1.9 MG/DL — SIGNIFICANT CHANGE UP (ref 1.6–2.6)
MCHC RBC-ENTMCNC: 28.8 PG — SIGNIFICANT CHANGE UP (ref 27–34)
MCHC RBC-ENTMCNC: 34.2 G/DL — SIGNIFICANT CHANGE UP (ref 32–36)
MCV RBC AUTO: 84.4 FL — SIGNIFICANT CHANGE UP (ref 80–100)
PHOSPHATE SERPL-MCNC: 3.3 MG/DL — SIGNIFICANT CHANGE UP (ref 2.5–4.5)
PLATELET # BLD AUTO: 240 K/UL — SIGNIFICANT CHANGE UP (ref 150–400)
POTASSIUM SERPL-MCNC: 4.2 MMOL/L — SIGNIFICANT CHANGE UP (ref 3.5–5.3)
POTASSIUM SERPL-SCNC: 4.2 MMOL/L — SIGNIFICANT CHANGE UP (ref 3.5–5.3)
RBC # BLD: 3.78 M/UL — LOW (ref 3.8–5.2)
RBC # FLD: 15.8 % — SIGNIFICANT CHANGE UP (ref 10.3–16.9)
SODIUM SERPL-SCNC: 136 MMOL/L — SIGNIFICANT CHANGE UP (ref 135–145)
WBC # BLD: 7.9 K/UL — SIGNIFICANT CHANGE UP (ref 3.8–10.5)
WBC # FLD AUTO: 7.9 K/UL — SIGNIFICANT CHANGE UP (ref 3.8–10.5)

## 2017-06-08 PROCEDURE — 71010: CPT | Mod: 26

## 2017-06-08 PROCEDURE — 99291 CRITICAL CARE FIRST HOUR: CPT

## 2017-06-08 RX ORDER — DEXAMETHASONE 0.5 MG/5ML
1 ELIXIR ORAL
Qty: 6 | Refills: 0 | OUTPATIENT
Start: 2017-06-08

## 2017-06-08 RX ORDER — DEXAMETHASONE 0.5 MG/5ML
ELIXIR ORAL
Qty: 0 | Refills: 0 | Status: DISCONTINUED | OUTPATIENT
Start: 2017-06-08 | End: 2017-06-08

## 2017-06-08 RX ORDER — OXYCODONE HYDROCHLORIDE 5 MG/1
1 TABLET ORAL
Qty: 42 | Refills: 0 | OUTPATIENT
Start: 2017-06-08 | End: 2017-06-15

## 2017-06-08 RX ORDER — DEXAMETHASONE 0.5 MG/5ML
1 ELIXIR ORAL
Qty: 6 | Refills: 0
Start: 2017-06-08

## 2017-06-08 RX ORDER — DEXAMETHASONE 0.5 MG/5ML
2 ELIXIR ORAL EVERY 12 HOURS
Qty: 0 | Refills: 0 | Status: DISCONTINUED | OUTPATIENT
Start: 2017-06-09 | End: 2017-06-08

## 2017-06-08 RX ORDER — TICAGRELOR 90 MG/1
60 TABLET ORAL
Qty: 3600 | Refills: 1 | OUTPATIENT
Start: 2017-06-08 | End: 2017-08-06

## 2017-06-08 RX ORDER — FLUDROCORTISONE ACETATE 0.1 MG/1
1 TABLET ORAL
Qty: 7 | Refills: 0 | OUTPATIENT
Start: 2017-06-08 | End: 2017-06-15

## 2017-06-08 RX ORDER — DILTIAZEM HCL 120 MG
1 CAPSULE, EXT RELEASE 24 HR ORAL
Qty: 0 | Refills: 0 | COMMUNITY

## 2017-06-08 RX ORDER — PANTOPRAZOLE SODIUM 20 MG/1
1 TABLET, DELAYED RELEASE ORAL
Qty: 7 | Refills: 0 | OUTPATIENT
Start: 2017-06-08 | End: 2017-06-15

## 2017-06-08 RX ORDER — ASPIRIN/CALCIUM CARB/MAGNESIUM 324 MG
1 TABLET ORAL
Qty: 0 | Refills: 0 | COMMUNITY

## 2017-06-08 RX ORDER — DEXAMETHASONE 0.5 MG/5ML
2 ELIXIR ORAL DAILY
Qty: 0 | Refills: 0 | Status: CANCELLED | OUTPATIENT
Start: 2017-06-10 | End: 2017-06-08

## 2017-06-08 RX ORDER — DOCUSATE SODIUM 100 MG
1 CAPSULE ORAL
Qty: 0 | Refills: 0 | DISCHARGE
Start: 2017-06-08

## 2017-06-08 RX ORDER — DEXAMETHASONE 0.5 MG/5ML
2 ELIXIR ORAL EVERY 8 HOURS
Qty: 0 | Refills: 0 | Status: DISCONTINUED | OUTPATIENT
Start: 2017-06-08 | End: 2017-06-08

## 2017-06-08 RX ADMIN — PANTOPRAZOLE SODIUM 40 MILLIGRAM(S): 20 TABLET, DELAYED RELEASE ORAL at 06:06

## 2017-06-08 RX ADMIN — Medication 650 MILLIGRAM(S): at 09:31

## 2017-06-08 RX ADMIN — HEPARIN SODIUM 5000 UNIT(S): 5000 INJECTION INTRAVENOUS; SUBCUTANEOUS at 06:01

## 2017-06-08 RX ADMIN — TICAGRELOR 60 MILLIGRAM(S): 90 TABLET ORAL at 05:59

## 2017-06-08 RX ADMIN — SODIUM CHLORIDE 2 GRAM(S): 9 INJECTION INTRAMUSCULAR; INTRAVENOUS; SUBCUTANEOUS at 05:59

## 2017-06-08 RX ADMIN — Medication 1 TABLET(S): at 07:37

## 2017-06-08 RX ADMIN — Medication 100 MILLIGRAM(S): at 06:01

## 2017-06-08 RX ADMIN — SODIUM CHLORIDE 2 GRAM(S): 9 INJECTION INTRAMUSCULAR; INTRAVENOUS; SUBCUTANEOUS at 11:20

## 2017-06-08 RX ADMIN — OXCARBAZEPINE 600 MILLIGRAM(S): 300 TABLET, FILM COATED ORAL at 05:58

## 2017-06-08 RX ADMIN — Medication 100 MILLIGRAM(S): at 14:57

## 2017-06-08 RX ADMIN — ISOSORBIDE MONONITRATE 60 MILLIGRAM(S): 60 TABLET, EXTENDED RELEASE ORAL at 11:20

## 2017-06-08 RX ADMIN — RANOLAZINE 1000 MILLIGRAM(S): 500 TABLET, FILM COATED, EXTENDED RELEASE ORAL at 05:59

## 2017-06-08 RX ADMIN — Medication 650 MILLIGRAM(S): at 07:43

## 2017-06-08 RX ADMIN — SODIUM CHLORIDE 2 GRAM(S): 9 INJECTION INTRAMUSCULAR; INTRAVENOUS; SUBCUTANEOUS at 00:00

## 2017-06-08 RX ADMIN — FLUDROCORTISONE ACETATE 0.1 MILLIGRAM(S): 0.1 TABLET ORAL at 07:36

## 2017-06-08 RX ADMIN — Medication 2 MILLIGRAM(S): at 14:57

## 2017-06-08 RX ADMIN — Medication 2: at 11:20

## 2017-06-08 RX ADMIN — Medication 4 MILLIGRAM(S): at 05:58

## 2017-06-08 NOTE — PROGRESS NOTE ADULT - SUBJECTIVE AND OBJECTIVE BOX
HPI:  30 yo woman with extensive medical history including HTN, dyslipidemia, CAD vs coronary vasospasm S/P PTCA x 3, who recently presented with L hemispheric TIAs (intermittent speech difficulties) as well as H/A and seizures. Vascular imaging (MRA, DSA) revealed bilateral ICA occlusion with multiple sources of collateral flow to bilateral hemispheres (mostly PCA on R side, PCA and transdural MMA collaterals on L side), consistent with severe bilateral moyamoya. Perfusion imaging (SPECT, NOVA) showed asymmetric flow favoring R side, which is worse after acetazolamide challenge. Bilateral cerebral revascularization was thus indicated, starting with the more symptomatic L side. Patient is being admitted electively for L direct-indirect EC-IC bypass. (31 May 2017 15:12)    OVERNIGHT EVENTS:  No acute events overnight    Vital Signs Last 24 Hrs  T(C): 36.7, Max: 37.4 (06-07 @ 14:00)  T(F): 98.1, Max: 99.3 (06-07 @ 14:00)  HR: 74 (70 - 88)  BP: 143/74 (115/63 - 150/55)  BP(mean): 90 (62 - 96)  RR: 17 (14 - 21)  SpO2: 95% (94% - 100%)    I&O's Detail  I & Os for 24h ending 08 Jun 2017 07:00  =============================================  IN:    Oral Fluid: 700 ml    sodium chloride 2%: 200 ml    sodium chloride 0.9%: 100 ml    Total IN: 1000 ml  ---------------------------------------------  OUT:    Voided: 1900 ml    Total OUT: 1900 ml  ---------------------------------------------  Total NET: -900 ml    I & Os for current day (as of 08 Jun 2017 10:58)  =============================================  IN:    Oral Fluid: 200 ml    Total IN: 200 ml  ---------------------------------------------  OUT:    Total OUT: 0 ml  ---------------------------------------------  Total NET: 200 ml    I&O's Summary  I & Os for 24h ending 08 Jun 2017 07:00  =============================================  IN: 1000 ml / OUT: 1900 ml / NET: -900 ml    I & Os for current day (as of 08 Jun 2017 10:58)  =============================================  IN: 200 ml / OUT: 0 ml / NET: 200 ml      PHYSICAL EXAM:  Neurological:  AAOx3, NAD, coherent speech, FC  CNII-XII grossly intact, PERRL, EOMI, R facial droop improving, tongue midline   MAEx4, strength 5/5 UE and LE b/l, no pronator drift  SILT throughout b/l      TUBES/LINES:  [] CVC  [] A-line  [] Lumbar Drain  [] Ventriculostomy  [] Other    DIET:  [] NPO  [x] Mechanical  [] Tube feeds    LABS:                        10.9   7.9   )-----------( 240      ( 08 Jun 2017 05:43 )             31.9      06-08    136  |  101  |  12  ----------------------------<  135<H>  4.2   |  25  |  0.60    Ca    8.8      08 Jun 2017 05:43  Phos  3.3     06-08  Mg     1.9     06-08              CAPILLARY BLOOD GLUCOSE  124 (07 Jun 2017 23:00)      Drug Levels: [] N/A    CSF Analysis: [] N/A      Allergies    No Known Allergies    Intolerances      MEDICATIONS:  Antibiotics:    Neuro:  acetaminophen   Tablet. 325milliGRAM(s) Oral every 4 hours PRN  acetaminophen   Tablet. 650milliGRAM(s) Oral every 6 hours PRN  ondansetron Injectable 4milliGRAM(s) IV Push every 6 hours PRN  OXcarbazepine 600milliGRAM(s) Oral two times a day  oxyCODONE  5 mG/acetaminophen 325 mG 1Tablet(s) Oral every 4 hours PRN    Anticoagulation:  ticagrelor 60milliGRAM(s) Oral two times a day  heparin  Injectable 5000Unit(s) SubCutaneous every 8 hours    OTHER:  docusate sodium 100milliGRAM(s) Oral three times a day  bisacodyl 5milliGRAM(s) Oral daily PRN  senna 2Tablet(s) Oral at bedtime PRN  insulin lispro (HumaLOG) corrective regimen sliding scale  SubCutaneous Before meals and at bedtime  atorvastatin 80milliGRAM(s) Oral at bedtime  ranolazine 1000milliGRAM(s) Oral two times a day  isosorbide   mononitrate ER Tablet (IMDUR) 60milliGRAM(s) Oral daily  aluminum hydroxide/magnesium hydroxide/simethicone Suspension 30milliLiter(s) Oral every 4 hours PRN  pantoprazole    Tablet 40milliGRAM(s) Oral before breakfast  fludroCORTISONE 0.1milliGRAM(s) Oral daily  nitroglycerin     SubLingual 0.4milliGRAM(s) SubLingual every 5 minutes PRN  dexamethasone     Tablet 2milliGRAM(s) Oral every 8 hours  dexamethasone     Tablet  Oral     IVF:  sodium chloride 2Gram(s) Oral every 6 hours    CULTURES:    RADIOLOGY & ADDITIONAL TESTS:      ASSESSMENT:  31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 home     CARDIOVASCULAR:  --160  -off cardizem    PULMONARY:  -room air  -IS    RENAL:  -Na 136  -off 2%    GI:  -regular diet  -GI ppx  -senna/colace    HEME:  -stable    ID:  -afebrile    ENDO:  -ISS    DVT PROPHYLAXIS:  [x] Venodynes                                [] Heparin/Lovenox  -SQH    -d/w Dr. Long and Dr. Rubio

## 2017-06-08 NOTE — PROGRESS NOTE ADULT - SUBJECTIVE AND OBJECTIVE BOX
=================================  NEUROCRITICAL CARE ATTENDING NOTE  =================================    STEPHANE NICOLE   MRN-[id number]  Summary:  31F with Hypertension, dyslipidemia, CAD, s/p stents x3, presented with intermittent speech difficulties, HA, seizures, diagnosed with TIAs.  Imaging revealed bilatearl ICA occlusion, Moyamoya pattern.  SPECT / NOVA showed asymmetric flow favoring R, with worsening perfusion on acetazolamide.  Admitted  for elective L direct and indirect bypass; bypass clotted in the OR - given extra doses of ASA, started on heparin drip.  transferred to NSICU for suspicion of chemical meningitis  Overnight Events: facial droop overnight, repeat CT NEG    PAST MEDICAL & SURGICAL HISTORY: TIA (transient ischemic attack): x 4 Moyamoya disease Dyslipidemia CAD (coronary artery disease): Hx Cardiac stent which was re occluded then open Essential hypertension: Hypertension Epilepsy: Epilepsy Coronary stent occlusion Other postprocedural status: S/P   NKDA  Home meds: NTG PRN, ISMN 30mg daily ticagrelor 90mg BID atorvastatin 80mg HS  daily ranexa 1g ER BID cardizem CD daily oxcarbazepine 600mg BID    PHYSICAL EXAMINATION  T(C): , Max: 37.4 (06-07 @ 14:00) HR: 82 (70 - 88) BP: 140/70 (115/63 - 150/57) RR: 17 (13 - 24) SpO2: 98% (94% - 100%)  NEUROLOGIC EXAMINATION:  Patient is awake, alert, fully oriented pupils 3mm equal and briskly reactive to light, EOMs intact, (+) facial droop R; moving all 4s 5/5; no meningisimus  GENERAL:  not intubated, not in cardiorespiratory distress  EENT: anicteric, periorbital edema / ecchymosis  CARDIOVASC:  (+) S1 S2, normal rate and regular rhythm  PULMONARY:  clear to auscultation bilaterally  ABDOMEN:  soft, nontender, with normoactive bowel sounds  EXTREMITIES:  no edema, no groin hematoma, good distal pulses  SKIN:  no rash    LABS:  CAPILLARY BLOOD GLUCOSE 124 (2017 23:00)                        10.9   7.9   )-----------( 240      ( 2017 05:43 )             31.9     136  |  101  |  12  ----------------------------<  135<H>  4.2   |  25  |  0.60    Ca    8.8      2017 05:43  Phos  3.3       Mg     1.9         I & Os for current day (as of  @ 07:52)  IN: 1000 ml / OUT: 1900 ml / NET: -900 ml    Bacteriology:  CSF studies:  EEG:  Neuroimagin/06 CT head:  post surgical changes, stable SAH / ICH, stable hypodensity L FPlobe :  CT acute ICH superior left frontal lobe (??tiny), stable post-surgical cahnges   CT head: s/p bypass, postsurgical changes, all focal area of acute/subacute ischemia within L frontal lobe;  CT  post-op changes  Other imagin/06 Doppler:  NEG    CXR: no acute pathology    MEDICATIONS: docusate bisacodyl senna mod ISS atorvastatin 80mg HS oxcarbazepine 600mg BID ranolazine 1g BID ticagrelor 60mg BID ISMN 60 daily maalox salt 2g q6h percocet pantoprazole 40 dexamethasone 4mg PO q8h fludrocortisone 0.1mg daily NTG PRN SQH    IV FLUIDS: 2%@50cc/hr  DRIPS:   DIET: NPO  Lines / Drains:     CODE STATUS:  full code                       GOALS OF CARE:  aggressive                      DISPOSITION:  ICU =================================  NEUROCRITICAL CARE ATTENDING NOTE  =================================    STEPHANE NICOLE   MRN-[id number]  Summary:  31F with Hypertension, dyslipidemia, CAD, s/p stents x3, presented with intermittent speech difficulties, HA, seizures, diagnosed with TIAs.  Imaging revealed bilatearl ICA occlusion, Moyamoya pattern.  SPECT / NOVA showed asymmetric flow favoring R, with worsening perfusion on acetazolamide.  Admitted  for elective L direct and indirect bypass; bypass clotted in the OR - given extra doses of ASA, started on heparin drip.  transferred to NSICU for suspicion of chemical meningitis   facial droop, repeat CT   Angio - direct bypass not patent  Overnight Events: no significant events overnight    PAST MEDICAL & SURGICAL HISTORY: TIA (transient ischemic attack): x 4 Moyamoya disease Dyslipidemia CAD (coronary artery disease): Hx Cardiac stent which was re occluded then open Essential hypertension: Hypertension Epilepsy: Epilepsy Coronary stent occlusion Other postprocedural status: S/P   NKDA  Home meds: NTG PRN, ISMN 30mg daily ticagrelor 90mg BID atorvastatin 80mg HS  daily ranexa 1g ER BID cardizem CD daily oxcarbazepine 600mg BID    PHYSICAL EXAMINATION  T(C): , Max: 37.4 (-07 @ 14:00) HR: 82 (70 - 88) BP: 140/70 (115/63 - 150/57) RR: 17 (13 - 24) SpO2: 98% (94% - 100%)  NEUROLOGIC EXAMINATION:  Patient is awake, alert, fully oriented pupils 3mm equal and briskly reactive to light, EOMs intact, trace facial droop (much improved compared to yesterday), moving all 4s 5/5; no meningisimus  GENERAL:  not intubated, not in cardiorespiratory distress  EENT: anicteric, periorbital edema / ecchymosis  CARDIOVASC:  (+) S1 S2, normal rate and regular rhythm  PULMONARY:  clear to auscultation bilaterally  ABDOMEN:  soft, nontender, with normoactive bowel sounds  EXTREMITIES:  no edema, no groin hematoma, good distal pulses  SKIN:  no rash    LABS:  CAPILLARY BLOOD GLUCOSE 124 (2017 23:00)                        10.9   7.9   )-----------( 240      ( 2017 05:43 )             31.9     136  |  101  |  12  ----------------------------<  135<H>  4.2   |  25  |  0.60    Ca    8.8      2017 05:43  Phos  3.3       Mg     1.9         I & Os for current day (as of  @ 07:52)  IN: 1000 ml / OUT: 1900 ml / NET: -900 ml    Bacteriology:  CSF studies:  EEG:  Neuroimagin/06 CT head:  post surgical changes, stable SAH / ICH, stable hypodensity L FPlobe :  CT acute ICH superior left frontal lobe (??tiny), stable post-surgical cahnges   CT head: s/p bypass, postsurgical changes, all focal area of acute/subacute ischemia within L frontal lobe;  CT  post-op changes  Other imagin/06 Doppler:  NEG    CXR: no acute pathology    MEDICATIONS: docusate bisacodyl senna mod ISS atorvastatin 80mg HS oxcarbazepine 600mg BID ranolazine 1g BID ticagrelor 60mg BID ISMN 60 daily maalox salt 2g q6h percocet pantoprazole 40 dexamethasone 4mg PO q8h fludrocortisone 0.1mg daily NTG PRN SQH    IV FLUIDS: IV locked  DRIPS:   DIET: regular  Lines / Drains:     CODE STATUS:  full code                       GOALS OF CARE:  aggressive                      DISPOSITION:  ICU / stepdown

## 2017-06-08 NOTE — PROGRESS NOTE ADULT - NSHPATTENDINGPLANDISCUSS_GEN_ALL_CORE
RN, house staff
Neuro PAs

## 2017-06-08 NOTE — PROGRESS NOTE ADULT - ATTENDING COMMENTS
PLAN:   NEURO: neurochecks q1h; PRN pain meds with Tylenol, no opiates  s/p bypass:  cont ticagrelor; angio to determine graft patency  epilepsy: continue oxcarbazepine 600mg BID, switch decadron to 4mg PO q6h  adjustment disorder - supportive therapy  REHAB:  PT EARLY MOB:  flat x 4 hours, then OOB to chair    PULM:  Room air, incentive spirometry  CARDIO:  SBP goal 120-160 mm Hg; continue ranexa, cont ISMN; off cardizem for now; cont nitrates PRN; continue high dose statin; cardio on board  ENDO:  Blood sugar goals 140-180 mg/dL, continue insulin sliding scale  GI:  PPI for GI prophylaxis; docusate senna  DIET: regular diet once more awake and after bedside swallow  RENAL:  cont 2%@50cc/hr, check BMP this PM, start fludro 0.1 mg daily  HEM/ONC: Hb stable  VTE Prophylaxis: SCDs, ticagrelor  ID: afebrile, no leukocytosis  Social: updated famliy this morning    Patient at high risk for neurological deterioration or death due to:  stroke, intracranial bleed, delirium, hyperperfusion injury, seizures.  Critical care time, excluding procedures: 45 minutes. PLAN:   NEURO: neurochecks q4h; PRN pain meds with Tylenol  s/p bypass:  cont ticagrelor; angio to determine graft patency  epilepsy: continue oxcarbazepine 600mg BID, dec decadron to 2 q8h today, 2 q 12 tomorrow, the once daily then d/c  adjustment disorder - supportive therapy  REHAB:  PT EARLY MOB:  ambulate    PULM:  Room air, incentive spirometry  CARDIO:  SBP goal 120-160 mm Hg; continue ranexa, cont ISMN; off cardizem for now; cont nitrates PRN; continue high dose statin; cardio on board  ENDO:  Blood sugar goals 140-180 mg/dL, continue insulin sliding scale  GI:  PPI for GI prophylaxis; docusate senna  DIET: regular diet   RENAL:  Na stable off 2%, on salt tabs and fludro 0.1 mg daily  HEM/ONC: Hb stable  VTE Prophylaxis: SCDs, ticagrelor, SQH  ID: afebrile, no leukocytosis  Social: will update family    Patient at high risk for neurological deterioration or death due to:  stroke, intracranial bleed, delirium, hyperperfusion injury, seizures.  Critical care time, excluding procedures: 45 minutes.

## 2017-06-08 NOTE — PROGRESS NOTE ADULT - PROVIDER SPECIALTY LIST ADULT
Cardiology
Heme/Onc
NSICU
Neurology
Neurosurgery

## 2017-06-09 PROBLEM — I67.5 MOYAMOYA DISEASE: Chronic | Status: ACTIVE | Noted: 2017-05-30

## 2017-06-12 ENCOUNTER — FORM ENCOUNTER (OUTPATIENT)
Age: 32
End: 2017-06-12

## 2017-06-12 DIAGNOSIS — I25.118 ATHEROSCLEROTIC HEART DISEASE OF NATIVE CORONARY ARTERY WITH OTHER FORMS OF ANGINA PECTORIS: ICD-10-CM

## 2017-06-12 DIAGNOSIS — E87.1 HYPO-OSMOLALITY AND HYPONATREMIA: ICD-10-CM

## 2017-06-12 DIAGNOSIS — I60.8 OTHER NONTRAUMATIC SUBARACHNOID HEMORRHAGE: ICD-10-CM

## 2017-06-12 DIAGNOSIS — D50.0 IRON DEFICIENCY ANEMIA SECONDARY TO BLOOD LOSS (CHRONIC): ICD-10-CM

## 2017-06-12 DIAGNOSIS — K59.00 CONSTIPATION, UNSPECIFIED: ICD-10-CM

## 2017-06-12 DIAGNOSIS — E78.5 HYPERLIPIDEMIA, UNSPECIFIED: ICD-10-CM

## 2017-06-12 DIAGNOSIS — Z79.82 LONG TERM (CURRENT) USE OF ASPIRIN: ICD-10-CM
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
## 2017-06-12 DIAGNOSIS — I74.2 EMBOLISM AND THROMBOSIS OF ARTERIES OF THE UPPER EXTREMITIES: ICD-10-CM

## 2017-06-12 DIAGNOSIS — Z81.8 FAMILY HISTORY OF OTHER MENTAL AND BEHAVIORAL DISORDERS: ICD-10-CM

## 2017-06-12 DIAGNOSIS — I10 ESSENTIAL (PRIMARY) HYPERTENSION: ICD-10-CM

## 2017-06-12 DIAGNOSIS — R47.9 UNSPECIFIED SPEECH DISTURBANCES: ICD-10-CM

## 2017-06-12 DIAGNOSIS — I67.5 MOYAMOYA DISEASE: ICD-10-CM

## 2017-06-12 DIAGNOSIS — I66.02 OCCLUSION AND STENOSIS OF LEFT MIDDLE CEREBRAL ARTERY: ICD-10-CM

## 2017-06-12 DIAGNOSIS — Z95.5 PRESENCE OF CORONARY ANGIOPLASTY IMPLANT AND GRAFT: ICD-10-CM

## 2017-06-12 DIAGNOSIS — Z86.73 PERSONAL HISTORY OF TRANSIENT ISCHEMIC ATTACK (TIA), AND CEREBRAL INFARCTION WITHOUT RESIDUAL DEFICITS: ICD-10-CM

## 2017-06-12 DIAGNOSIS — G03.8 MENINGITIS DUE TO OTHER SPECIFIED CAUSES: ICD-10-CM

## 2017-06-12 DIAGNOSIS — I65.23 OCCLUSION AND STENOSIS OF BILATERAL CAROTID ARTERIES: ICD-10-CM

## 2017-06-12 DIAGNOSIS — I62.9 NONTRAUMATIC INTRACRANIAL HEMORRHAGE, UNSPECIFIED: ICD-10-CM

## 2017-06-12 DIAGNOSIS — R29.810 FACIAL WEAKNESS: ICD-10-CM

## 2017-06-12 DIAGNOSIS — G40.909 EPILEPSY, UNSPECIFIED, NOT INTRACTABLE, WITHOUT STATUS EPILEPTICUS: ICD-10-CM

## 2017-06-13 ENCOUNTER — OUTPATIENT (OUTPATIENT)
Dept: OUTPATIENT SERVICES | Facility: HOSPITAL | Age: 32
LOS: 1 days | End: 2017-06-13
Payer: MEDICAID

## 2017-06-13 ENCOUNTER — MEDICATION RENEWAL (OUTPATIENT)
Age: 32
End: 2017-06-13

## 2017-06-13 ENCOUNTER — APPOINTMENT (OUTPATIENT)
Dept: NEUROSURGERY | Facility: CLINIC | Age: 32
End: 2017-06-13

## 2017-06-13 VITALS
SYSTOLIC BLOOD PRESSURE: 121 MMHG | BODY MASS INDEX: 22.58 KG/M2 | WEIGHT: 149 LBS | DIASTOLIC BLOOD PRESSURE: 74 MMHG | HEART RATE: 75 BPM | TEMPERATURE: 98.1 F | OXYGEN SATURATION: 98 % | HEIGHT: 68 IN

## 2017-06-13 DIAGNOSIS — T82.897A OTHER SPECIFIED COMPLICATION OF CARDIAC PROSTHETIC DEVICES, IMPLANTS AND GRAFTS, INITIAL ENCOUNTER: Chronic | ICD-10-CM

## 2017-06-13 PROCEDURE — 70450 CT HEAD/BRAIN W/O DYE: CPT

## 2017-06-13 PROCEDURE — 70450 CT HEAD/BRAIN W/O DYE: CPT | Mod: 26

## 2017-06-23 ENCOUNTER — APPOINTMENT (OUTPATIENT)
Dept: HEART AND VASCULAR | Facility: CLINIC | Age: 32
End: 2017-06-23

## 2017-06-23 VITALS — BODY MASS INDEX: 22.66 KG/M2 | WEIGHT: 149 LBS

## 2017-06-23 VITALS — DIASTOLIC BLOOD PRESSURE: 79 MMHG | SYSTOLIC BLOOD PRESSURE: 145 MMHG | OXYGEN SATURATION: 98 % | HEART RATE: 84 BPM

## 2017-06-23 RX ORDER — ASPIRIN 325 MG/1
325 TABLET, FILM COATED ORAL DAILY
Qty: 30 | Refills: 3 | Status: DISCONTINUED | COMMUNITY
Start: 2017-05-11 | End: 2017-06-23

## 2017-06-29 ENCOUNTER — APPOINTMENT (OUTPATIENT)
Dept: NEUROSURGERY | Facility: CLINIC | Age: 32
End: 2017-06-29

## 2017-06-29 VITALS
SYSTOLIC BLOOD PRESSURE: 137 MMHG | WEIGHT: 149 LBS | OXYGEN SATURATION: 98 % | BODY MASS INDEX: 22.58 KG/M2 | DIASTOLIC BLOOD PRESSURE: 87 MMHG | HEART RATE: 92 BPM | HEIGHT: 68 IN

## 2017-07-23 PROCEDURE — 93005 ELECTROCARDIOGRAM TRACING: CPT

## 2017-07-23 PROCEDURE — C1894: CPT

## 2017-07-23 PROCEDURE — 70450 CT HEAD/BRAIN W/O DYE: CPT

## 2017-07-23 PROCEDURE — 85306 CLOT INHIBIT PROT S FREE: CPT

## 2017-07-23 PROCEDURE — 85610 PROTHROMBIN TIME: CPT

## 2017-07-23 PROCEDURE — 85415 FIBRINOLYTIC PLASMINOGEN: CPT

## 2017-07-23 PROCEDURE — 80048 BASIC METABOLIC PNL TOTAL CA: CPT

## 2017-07-23 PROCEDURE — 86900 BLOOD TYPING SEROLOGIC ABO: CPT

## 2017-07-23 PROCEDURE — 97161 PT EVAL LOW COMPLEX 20 MIN: CPT

## 2017-07-23 PROCEDURE — 86140 C-REACTIVE PROTEIN: CPT

## 2017-07-23 PROCEDURE — 83735 ASSAY OF MAGNESIUM: CPT

## 2017-07-23 PROCEDURE — 85598 HEXAGNAL PHOSPH PLTLT NEUTRL: CPT

## 2017-07-23 PROCEDURE — 84300 ASSAY OF URINE SODIUM: CPT

## 2017-07-23 PROCEDURE — 82553 CREATINE MB FRACTION: CPT

## 2017-07-23 PROCEDURE — C1887: CPT

## 2017-07-23 PROCEDURE — 84484 ASSAY OF TROPONIN QUANT: CPT

## 2017-07-23 PROCEDURE — 93970 EXTREMITY STUDY: CPT

## 2017-07-23 PROCEDURE — 83036 HEMOGLOBIN GLYCOSYLATED A1C: CPT

## 2017-07-23 PROCEDURE — 70496 CT ANGIOGRAPHY HEAD: CPT

## 2017-07-23 PROCEDURE — 85576 BLOOD PLATELET AGGREGATION: CPT

## 2017-07-23 PROCEDURE — 70544 MR ANGIOGRAPHY HEAD W/O DYE: CPT

## 2017-07-23 PROCEDURE — 85027 COMPLETE CBC AUTOMATED: CPT

## 2017-07-23 PROCEDURE — C1889: CPT

## 2017-07-23 PROCEDURE — 85730 THROMBOPLASTIN TIME PARTIAL: CPT

## 2017-07-23 PROCEDURE — 85652 RBC SED RATE AUTOMATED: CPT

## 2017-07-23 PROCEDURE — 71045 X-RAY EXAM CHEST 1 VIEW: CPT

## 2017-07-23 PROCEDURE — 85300 ANTITHROMBIN III ACTIVITY: CPT

## 2017-07-23 PROCEDURE — 84165 PROTEIN E-PHORESIS SERUM: CPT

## 2017-07-23 PROCEDURE — 81001 URINALYSIS AUTO W/SCOPE: CPT

## 2017-07-23 PROCEDURE — 83090 ASSAY OF HOMOCYSTEINE: CPT

## 2017-07-23 PROCEDURE — 36415 COLL VENOUS BLD VENIPUNCTURE: CPT

## 2017-07-23 PROCEDURE — 85613 RUSSELL VIPER VENOM DILUTED: CPT

## 2017-07-23 PROCEDURE — 81025 URINE PREGNANCY TEST: CPT

## 2017-07-23 PROCEDURE — C1769: CPT

## 2017-07-23 PROCEDURE — 97116 GAIT TRAINING THERAPY: CPT

## 2017-07-23 PROCEDURE — 84702 CHORIONIC GONADOTROPIN TEST: CPT

## 2017-07-23 PROCEDURE — C1713: CPT

## 2017-07-23 PROCEDURE — 84100 ASSAY OF PHOSPHORUS: CPT

## 2017-07-23 PROCEDURE — 82550 ASSAY OF CK (CPK): CPT

## 2017-07-23 PROCEDURE — 86038 ANTINUCLEAR ANTIBODIES: CPT

## 2017-07-23 PROCEDURE — 85303 CLOT INHIBIT PROT C ACTIVITY: CPT

## 2017-07-23 PROCEDURE — 83935 ASSAY OF URINE OSMOLALITY: CPT

## 2017-07-23 PROCEDURE — 83695 ASSAY OF LIPOPROTEIN(A): CPT

## 2017-07-23 PROCEDURE — C1760: CPT

## 2017-07-23 PROCEDURE — 97535 SELF CARE MNGMENT TRAINING: CPT

## 2017-07-23 PROCEDURE — 86146 BETA-2 GLYCOPROTEIN ANTIBODY: CPT

## 2017-07-23 PROCEDURE — 80053 COMPREHEN METABOLIC PANEL: CPT

## 2017-07-23 PROCEDURE — 85301 ANTITHROMBIN III ANTIGEN: CPT

## 2017-07-23 PROCEDURE — 84155 ASSAY OF PROTEIN SERUM: CPT

## 2017-07-23 PROCEDURE — 86850 RBC ANTIBODY SCREEN: CPT

## 2017-07-23 PROCEDURE — 86901 BLOOD TYPING SEROLOGIC RH(D): CPT

## 2017-08-25 ENCOUNTER — APPOINTMENT (OUTPATIENT)
Dept: HEART AND VASCULAR | Facility: CLINIC | Age: 32
End: 2017-08-25
Payer: MEDICAID

## 2017-08-25 VITALS — DIASTOLIC BLOOD PRESSURE: 74 MMHG | OXYGEN SATURATION: 100 % | HEART RATE: 91 BPM | SYSTOLIC BLOOD PRESSURE: 139 MMHG

## 2017-08-25 PROCEDURE — 99214 OFFICE O/P EST MOD 30 MIN: CPT

## 2017-08-27 ENCOUNTER — FORM ENCOUNTER (OUTPATIENT)
Age: 32
End: 2017-08-27

## 2017-08-28 ENCOUNTER — OUTPATIENT (OUTPATIENT)
Dept: OUTPATIENT SERVICES | Facility: HOSPITAL | Age: 32
LOS: 1 days | End: 2017-08-28
Payer: MEDICAID

## 2017-08-28 DIAGNOSIS — T82.897A OTHER SPECIFIED COMPLICATION OF CARDIAC PROSTHETIC DEVICES, IMPLANTS AND GRAFTS, INITIAL ENCOUNTER: Chronic | ICD-10-CM

## 2017-08-28 PROCEDURE — 70553 MRI BRAIN STEM W/O & W/DYE: CPT | Mod: 26

## 2017-08-28 PROCEDURE — A9585: CPT

## 2017-08-28 PROCEDURE — 70544 MR ANGIOGRAPHY HEAD W/O DYE: CPT

## 2017-08-28 PROCEDURE — 70553 MRI BRAIN STEM W/O & W/DYE: CPT

## 2017-08-28 PROCEDURE — 70544 MR ANGIOGRAPHY HEAD W/O DYE: CPT | Mod: 26,59

## 2017-10-16 ENCOUNTER — CHART COPY (OUTPATIENT)
Age: 32
End: 2017-10-16

## 2017-10-19 ENCOUNTER — APPOINTMENT (OUTPATIENT)
Dept: NEUROSURGERY | Facility: CLINIC | Age: 32
End: 2017-10-19
Payer: MEDICAID

## 2017-10-19 VITALS — HEART RATE: 77 BPM | SYSTOLIC BLOOD PRESSURE: 129 MMHG | DIASTOLIC BLOOD PRESSURE: 80 MMHG | OXYGEN SATURATION: 100 %

## 2017-10-19 PROCEDURE — 99215 OFFICE O/P EST HI 40 MIN: CPT

## 2017-10-27 ENCOUNTER — APPOINTMENT (OUTPATIENT)
Dept: HEART AND VASCULAR | Facility: CLINIC | Age: 32
End: 2017-10-27
Payer: MEDICAID

## 2017-10-27 VITALS
HEART RATE: 86 BPM | WEIGHT: 155 LBS | SYSTOLIC BLOOD PRESSURE: 160 MMHG | BODY MASS INDEX: 23.49 KG/M2 | HEIGHT: 68 IN | DIASTOLIC BLOOD PRESSURE: 92 MMHG | OXYGEN SATURATION: 100 %

## 2017-10-27 PROCEDURE — 99214 OFFICE O/P EST MOD 30 MIN: CPT

## 2018-03-20 ENCOUNTER — APPOINTMENT (OUTPATIENT)
Dept: NEUROSURGERY | Facility: CLINIC | Age: 33
End: 2018-03-20
Payer: MEDICAID

## 2018-03-20 VITALS
BODY MASS INDEX: 23.64 KG/M2 | WEIGHT: 156 LBS | HEIGHT: 68 IN | HEART RATE: 80 BPM | RESPIRATION RATE: 14 BRPM | DIASTOLIC BLOOD PRESSURE: 84 MMHG | SYSTOLIC BLOOD PRESSURE: 141 MMHG | OXYGEN SATURATION: 100 % | TEMPERATURE: 97.3 F

## 2018-03-20 PROCEDURE — 99215 OFFICE O/P EST HI 40 MIN: CPT

## 2018-03-20 RX ORDER — CARBAMAZEPINE 200 MG/1
200 TABLET ORAL
Qty: 120 | Refills: 0 | Status: DISCONTINUED | COMMUNITY
Start: 2016-07-15 | End: 2018-03-20

## 2018-03-20 RX ORDER — ISOSORBIDE MONONITRATE 30 MG/1
30 TABLET, EXTENDED RELEASE ORAL
Qty: 90 | Refills: 3 | Status: DISCONTINUED | COMMUNITY
Start: 2017-08-25 | End: 2018-03-20

## 2018-03-30 ENCOUNTER — APPOINTMENT (OUTPATIENT)
Dept: HEART AND VASCULAR | Facility: CLINIC | Age: 33
End: 2018-03-30
Payer: MEDICAID

## 2018-03-30 VITALS — WEIGHT: 157 LBS | HEIGHT: 68 IN | BODY MASS INDEX: 23.79 KG/M2

## 2018-03-30 VITALS — HEART RATE: 77 BPM | DIASTOLIC BLOOD PRESSURE: 87 MMHG | SYSTOLIC BLOOD PRESSURE: 137 MMHG

## 2018-03-30 PROCEDURE — 99214 OFFICE O/P EST MOD 30 MIN: CPT | Mod: 25

## 2018-03-30 PROCEDURE — 93000 ELECTROCARDIOGRAM COMPLETE: CPT

## 2018-04-06 ENCOUNTER — OUTPATIENT (OUTPATIENT)
Dept: OUTPATIENT SERVICES | Facility: HOSPITAL | Age: 33
LOS: 1 days | End: 2018-04-06
Payer: MEDICAID

## 2018-04-06 DIAGNOSIS — I67.1 CEREBRAL ANEURYSM, NONRUPTURED: ICD-10-CM

## 2018-04-06 DIAGNOSIS — T82.897A OTHER SPECIFIED COMPLICATION OF CARDIAC PROSTHETIC DEVICES, IMPLANTS AND GRAFTS, INITIAL ENCOUNTER: Chronic | ICD-10-CM

## 2018-04-06 LAB
HCG SERPL-ACNC: 107 MIU/ML — HIGH
PA ADP PRP-ACNC: 9 PRU — LOW (ref 194–417)

## 2018-04-06 PROCEDURE — 85576 BLOOD PLATELET AGGREGATION: CPT

## 2018-04-06 PROCEDURE — 84702 CHORIONIC GONADOTROPIN TEST: CPT

## 2018-04-06 RX ORDER — CHLORHEXIDINE GLUCONATE 213 G/1000ML
1 SOLUTION TOPICAL ONCE
Qty: 0 | Refills: 0 | Status: DISCONTINUED | OUTPATIENT
Start: 2018-04-06 | End: 2018-04-21

## 2018-04-10 ENCOUNTER — APPOINTMENT (OUTPATIENT)
Dept: MRI IMAGING | Facility: HOSPITAL | Age: 33
End: 2018-04-10

## 2018-04-12 ENCOUNTER — APPOINTMENT (OUTPATIENT)
Dept: NEUROLOGY | Facility: CLINIC | Age: 33
End: 2018-04-12
Payer: MEDICAID

## 2018-04-12 VITALS
HEIGHT: 68 IN | TEMPERATURE: 97.9 F | SYSTOLIC BLOOD PRESSURE: 136 MMHG | OXYGEN SATURATION: 98 % | DIASTOLIC BLOOD PRESSURE: 89 MMHG | WEIGHT: 158 LBS | HEART RATE: 85 BPM | BODY MASS INDEX: 23.95 KG/M2

## 2018-04-12 PROCEDURE — 99215 OFFICE O/P EST HI 40 MIN: CPT

## 2018-04-13 ENCOUNTER — APPOINTMENT (OUTPATIENT)
Dept: HEART AND VASCULAR | Facility: CLINIC | Age: 33
End: 2018-04-13
Payer: MEDICAID

## 2018-04-13 VITALS — SYSTOLIC BLOOD PRESSURE: 139 MMHG | OXYGEN SATURATION: 100 % | HEART RATE: 99 BPM | DIASTOLIC BLOOD PRESSURE: 67 MMHG

## 2018-04-13 LAB
ALBUMIN SERPL ELPH-MCNC: 4.4 G/DL
ALP BLD-CCNC: 62 U/L
ALT SERPL-CCNC: 14 U/L
ANION GAP SERPL CALC-SCNC: 12 MMOL/L
AST SERPL-CCNC: 14 U/L
BASOPHILS # BLD AUTO: 0.02 K/UL
BASOPHILS NFR BLD AUTO: 0.4 %
BILIRUB SERPL-MCNC: 0.2 MG/DL
BUN SERPL-MCNC: 9 MG/DL
CALCIUM SERPL-MCNC: 9.6 MG/DL
CHLORIDE SERPL-SCNC: 106 MMOL/L
CO2 SERPL-SCNC: 23 MMOL/L
CREAT SERPL-MCNC: 0.79 MG/DL
EOSINOPHIL # BLD AUTO: 0.04 K/UL
EOSINOPHIL NFR BLD AUTO: 0.8 %
GLUCOSE SERPL-MCNC: 96 MG/DL
HCT VFR BLD CALC: 38.1 %
HGB BLD-MCNC: 12.1 G/DL
IMM GRANULOCYTES NFR BLD AUTO: 0.2 %
LYMPHOCYTES # BLD AUTO: 1.84 K/UL
LYMPHOCYTES NFR BLD AUTO: 37.3 %
MAN DIFF?: NORMAL
MCHC RBC-ENTMCNC: 28.1 PG
MCHC RBC-ENTMCNC: 31.8 GM/DL
MCV RBC AUTO: 88.4 FL
MONOCYTES # BLD AUTO: 0.52 K/UL
MONOCYTES NFR BLD AUTO: 10.5 %
NEUTROPHILS # BLD AUTO: 2.5 K/UL
NEUTROPHILS NFR BLD AUTO: 50.8 %
PLATELET # BLD AUTO: 290 K/UL
POTASSIUM SERPL-SCNC: 5.1 MMOL/L
PROT SERPL-MCNC: 7.3 G/DL
RBC # BLD: 4.31 M/UL
RBC # FLD: 15.4 %
SODIUM SERPL-SCNC: 141 MMOL/L
WBC # FLD AUTO: 4.93 K/UL

## 2018-04-13 PROCEDURE — 99215 OFFICE O/P EST HI 40 MIN: CPT

## 2018-04-18 ENCOUNTER — APPOINTMENT (OUTPATIENT)
Dept: OBGYN | Facility: CLINIC | Age: 33
End: 2018-04-18
Payer: MEDICAID

## 2018-04-18 VITALS
BODY MASS INDEX: 24.1 KG/M2 | HEIGHT: 68 IN | DIASTOLIC BLOOD PRESSURE: 101 MMHG | SYSTOLIC BLOOD PRESSURE: 161 MMHG | WEIGHT: 159 LBS

## 2018-04-18 DIAGNOSIS — I21.02 ST ELEVATION (STEMI) MYOCARDIAL INFARCTION INVOLVING LEFT ANTERIOR DESCENDING CORONARY ARTERY: ICD-10-CM

## 2018-04-18 DIAGNOSIS — Z83.49 FAMILY HISTORY OF OTHER ENDOCRINE, NUTRITIONAL AND METABOLIC DISEASES: ICD-10-CM

## 2018-04-18 DIAGNOSIS — O11.9 PRE-EXISTING HYPERTENSION WITH PRE-ECLAMPSIA, UNSPECIFIED TRIMESTER: ICD-10-CM

## 2018-04-18 PROCEDURE — 99205 OFFICE O/P NEW HI 60 MIN: CPT

## 2018-04-19 LAB — OXCARBAZEPINE SERPL-MCNC: NORMAL UG/ML

## 2018-05-11 ENCOUNTER — APPOINTMENT (OUTPATIENT)
Dept: HEART AND VASCULAR | Facility: CLINIC | Age: 33
End: 2018-05-11
Payer: MEDICAID

## 2018-05-11 VITALS — OXYGEN SATURATION: 100 % | HEART RATE: 90 BPM | DIASTOLIC BLOOD PRESSURE: 85 MMHG | SYSTOLIC BLOOD PRESSURE: 141 MMHG

## 2018-05-11 PROCEDURE — 99215 OFFICE O/P EST HI 40 MIN: CPT

## 2018-05-16 ENCOUNTER — APPOINTMENT (OUTPATIENT)
Dept: NEUROLOGY | Facility: CLINIC | Age: 33
End: 2018-05-16
Payer: MEDICAID

## 2018-05-16 VITALS
BODY MASS INDEX: 24.4 KG/M2 | OXYGEN SATURATION: 100 % | SYSTOLIC BLOOD PRESSURE: 122 MMHG | HEART RATE: 89 BPM | DIASTOLIC BLOOD PRESSURE: 83 MMHG | HEIGHT: 68 IN | WEIGHT: 161 LBS | TEMPERATURE: 98.8 F

## 2018-05-16 PROCEDURE — 99214 OFFICE O/P EST MOD 30 MIN: CPT

## 2018-05-16 PROCEDURE — XXXXX: CPT

## 2018-05-17 ENCOUNTER — EMERGENCY (EMERGENCY)
Facility: HOSPITAL | Age: 33
LOS: 1 days | Discharge: HOME | End: 2018-05-17
Attending: EMERGENCY MEDICINE | Admitting: EMERGENCY MEDICINE
Payer: MEDICAID

## 2018-05-17 ENCOUNTER — APPOINTMENT (OUTPATIENT)
Dept: NEUROLOGY | Facility: CLINIC | Age: 33
End: 2018-05-17
Payer: MEDICAID

## 2018-05-17 VITALS
SYSTOLIC BLOOD PRESSURE: 108 MMHG | DIASTOLIC BLOOD PRESSURE: 72 MMHG | OXYGEN SATURATION: 98 % | WEIGHT: 161 LBS | HEIGHT: 68 IN | HEART RATE: 97 BPM | BODY MASS INDEX: 24.4 KG/M2

## 2018-05-17 VITALS
SYSTOLIC BLOOD PRESSURE: 122 MMHG | OXYGEN SATURATION: 98 % | RESPIRATION RATE: 18 BRPM | HEART RATE: 76 BPM | DIASTOLIC BLOOD PRESSURE: 76 MMHG | TEMPERATURE: 98 F

## 2018-05-17 VITALS
SYSTOLIC BLOOD PRESSURE: 119 MMHG | HEART RATE: 86 BPM | DIASTOLIC BLOOD PRESSURE: 74 MMHG | RESPIRATION RATE: 18 BRPM | OXYGEN SATURATION: 100 % | TEMPERATURE: 98 F

## 2018-05-17 DIAGNOSIS — Z3A.12 12 WEEKS GESTATION OF PREGNANCY: ICD-10-CM

## 2018-05-17 DIAGNOSIS — O23.41 UNSPECIFIED INFECTION OF URINARY TRACT IN PREGNANCY, FIRST TRIMESTER: ICD-10-CM

## 2018-05-17 DIAGNOSIS — T82.897A OTHER SPECIFIED COMPLICATION OF CARDIAC PROSTHETIC DEVICES, IMPLANTS AND GRAFTS, INITIAL ENCOUNTER: Chronic | ICD-10-CM

## 2018-05-17 DIAGNOSIS — O20.9 HEMORRHAGE IN EARLY PREGNANCY, UNSPECIFIED: ICD-10-CM

## 2018-05-17 DIAGNOSIS — Z79.891 LONG TERM (CURRENT) USE OF OPIATE ANALGESIC: ICD-10-CM

## 2018-05-17 DIAGNOSIS — I10 ESSENTIAL (PRIMARY) HYPERTENSION: ICD-10-CM

## 2018-05-17 DIAGNOSIS — I25.10 ATHEROSCLEROTIC HEART DISEASE OF NATIVE CORONARY ARTERY WITHOUT ANGINA PECTORIS: ICD-10-CM

## 2018-05-17 DIAGNOSIS — Z79.899 OTHER LONG TERM (CURRENT) DRUG THERAPY: ICD-10-CM

## 2018-05-17 DIAGNOSIS — Z79.2 LONG TERM (CURRENT) USE OF ANTIBIOTICS: ICD-10-CM

## 2018-05-17 LAB
ALBUMIN SERPL ELPH-MCNC: 3.8 G/DL — SIGNIFICANT CHANGE UP (ref 3.3–5)
ALP SERPL-CCNC: 64 U/L — SIGNIFICANT CHANGE UP (ref 40–120)
ALT FLD-CCNC: 7 U/L — LOW (ref 10–45)
ANION GAP SERPL CALC-SCNC: 13 MMOL/L — SIGNIFICANT CHANGE UP (ref 5–17)
APPEARANCE UR: (no result)
AST SERPL-CCNC: 17 U/L — SIGNIFICANT CHANGE UP (ref 10–40)
BASOPHILS NFR BLD AUTO: 0.2 % — SIGNIFICANT CHANGE UP (ref 0–2)
BILIRUB SERPL-MCNC: <0.2 MG/DL — SIGNIFICANT CHANGE UP (ref 0.2–1.2)
BILIRUB UR-MCNC: NEGATIVE — SIGNIFICANT CHANGE UP
BLD GP AB SCN SERPL QL: NEGATIVE — SIGNIFICANT CHANGE UP
BUN SERPL-MCNC: 5 MG/DL — LOW (ref 7–23)
CALCIUM SERPL-MCNC: 9.2 MG/DL — SIGNIFICANT CHANGE UP (ref 8.4–10.5)
CHLORIDE SERPL-SCNC: 96 MMOL/L — SIGNIFICANT CHANGE UP (ref 96–108)
CO2 SERPL-SCNC: 21 MMOL/L — LOW (ref 22–31)
COLOR SPEC: YELLOW — SIGNIFICANT CHANGE UP
CREAT SERPL-MCNC: 0.54 MG/DL — SIGNIFICANT CHANGE UP (ref 0.5–1.3)
DIFF PNL FLD: (no result)
EOSINOPHIL NFR BLD AUTO: 0.4 % — SIGNIFICANT CHANGE UP (ref 0–6)
GLUCOSE SERPL-MCNC: 94 MG/DL — SIGNIFICANT CHANGE UP (ref 70–99)
GLUCOSE UR QL: NEGATIVE — SIGNIFICANT CHANGE UP
HCG SERPL-ACNC: HIGH MIU/ML
HCT VFR BLD CALC: 35.5 % — SIGNIFICANT CHANGE UP (ref 34.5–45)
HGB BLD-MCNC: 11.9 G/DL — SIGNIFICANT CHANGE UP (ref 11.5–15.5)
KETONES UR-MCNC: (no result) MG/DL
LEUKOCYTE ESTERASE UR-ACNC: (no result)
LYMPHOCYTES # BLD AUTO: 20.3 % — SIGNIFICANT CHANGE UP (ref 13–44)
MCHC RBC-ENTMCNC: 27.9 PG — SIGNIFICANT CHANGE UP (ref 27–34)
MCHC RBC-ENTMCNC: 33.5 G/DL — SIGNIFICANT CHANGE UP (ref 32–36)
MCV RBC AUTO: 83.3 FL — SIGNIFICANT CHANGE UP (ref 80–100)
MONOCYTES NFR BLD AUTO: 10 % — SIGNIFICANT CHANGE UP (ref 2–14)
NEUTROPHILS NFR BLD AUTO: 69.1 % — SIGNIFICANT CHANGE UP (ref 43–77)
NITRITE UR-MCNC: NEGATIVE — SIGNIFICANT CHANGE UP
PH UR: 6 — SIGNIFICANT CHANGE UP (ref 5–8)
PLATELET # BLD AUTO: 244 K/UL — SIGNIFICANT CHANGE UP (ref 150–400)
POTASSIUM SERPL-MCNC: 4.7 MMOL/L — SIGNIFICANT CHANGE UP (ref 3.5–5.3)
POTASSIUM SERPL-SCNC: 4.7 MMOL/L — SIGNIFICANT CHANGE UP (ref 3.5–5.3)
PROT SERPL-MCNC: 7.1 G/DL — SIGNIFICANT CHANGE UP (ref 6–8.3)
PROT UR-MCNC: (no result) MG/DL
RBC # BLD: 4.26 M/UL — SIGNIFICANT CHANGE UP (ref 3.8–5.2)
RBC # FLD: 13.5 % — SIGNIFICANT CHANGE UP (ref 10.3–16.9)
RH IG SCN BLD-IMP: POSITIVE — SIGNIFICANT CHANGE UP
SODIUM SERPL-SCNC: 130 MMOL/L — LOW (ref 135–145)
SP GR SPEC: 1.02 — SIGNIFICANT CHANGE UP (ref 1–1.03)
UROBILINOGEN FLD QL: 0.2 E.U./DL — SIGNIFICANT CHANGE UP
WBC # BLD: 5.3 K/UL — SIGNIFICANT CHANGE UP (ref 3.8–10.5)
WBC # FLD AUTO: 5.3 K/UL — SIGNIFICANT CHANGE UP (ref 3.8–10.5)

## 2018-05-17 PROCEDURE — 84702 CHORIONIC GONADOTROPIN TEST: CPT

## 2018-05-17 PROCEDURE — 76801 OB US < 14 WKS SINGLE FETUS: CPT | Mod: 26

## 2018-05-17 PROCEDURE — 80053 COMPREHEN METABOLIC PANEL: CPT

## 2018-05-17 PROCEDURE — 81001 URINALYSIS AUTO W/SCOPE: CPT

## 2018-05-17 PROCEDURE — 87086 URINE CULTURE/COLONY COUNT: CPT

## 2018-05-17 PROCEDURE — 86900 BLOOD TYPING SEROLOGIC ABO: CPT

## 2018-05-17 PROCEDURE — 99284 EMERGENCY DEPT VISIT MOD MDM: CPT

## 2018-05-17 PROCEDURE — 36415 COLL VENOUS BLD VENIPUNCTURE: CPT

## 2018-05-17 PROCEDURE — 99284 EMERGENCY DEPT VISIT MOD MDM: CPT | Mod: 25

## 2018-05-17 PROCEDURE — 85025 COMPLETE CBC W/AUTO DIFF WBC: CPT

## 2018-05-17 PROCEDURE — 76817 TRANSVAGINAL US OBSTETRIC: CPT

## 2018-05-17 PROCEDURE — 76801 OB US < 14 WKS SINGLE FETUS: CPT

## 2018-05-17 PROCEDURE — 86850 RBC ANTIBODY SCREEN: CPT

## 2018-05-17 PROCEDURE — 86901 BLOOD TYPING SEROLOGIC RH(D): CPT

## 2018-05-17 PROCEDURE — 99214 OFFICE O/P EST MOD 30 MIN: CPT

## 2018-05-17 PROCEDURE — 76817 TRANSVAGINAL US OBSTETRIC: CPT | Mod: 26

## 2018-05-17 RX ORDER — NITROFURANTOIN MACROCRYSTAL 50 MG
1 CAPSULE ORAL
Qty: 20 | Refills: 0
Start: 2018-05-17 | End: 2018-05-26

## 2018-05-17 RX ORDER — ACETAMINOPHEN 500 MG
650 TABLET ORAL ONCE
Qty: 0 | Refills: 0 | Status: COMPLETED | OUTPATIENT
Start: 2018-05-17 | End: 2018-05-17

## 2018-05-17 RX ORDER — NITROFURANTOIN MACROCRYSTAL 50 MG
100 CAPSULE ORAL ONCE
Qty: 0 | Refills: 0 | Status: COMPLETED | OUTPATIENT
Start: 2018-05-17 | End: 2018-05-17

## 2018-05-17 RX ADMIN — Medication 100 MILLIGRAM(S): at 22:05

## 2018-05-17 RX ADMIN — Medication 650 MILLIGRAM(S): at 22:04

## 2018-05-17 RX ADMIN — Medication 650 MILLIGRAM(S): at 22:05

## 2018-05-17 NOTE — ED PROVIDER NOTE - CARE PLAN
Principal Discharge DX:	Vaginal bleeding affecting early pregnancy  Secondary Diagnosis:	UTI (urinary tract infection)

## 2018-05-17 NOTE — ED ADULT NURSE NOTE - CHPI ED SYMPTOMS NEG
no abdominal pain/no nausea/no fever/no dysuria/no discharge/no chills/no back pain/no vaginal discharge/no vomiting/no pain

## 2018-05-17 NOTE — ED ADULT NURSE NOTE - OBJECTIVE STATEMENT
Pt states 1 week with scant amount of vag bleeding only on toilet paper. Denies cramping or any pain.

## 2018-05-17 NOTE — ED PROVIDER NOTE - OBJECTIVE STATEMENT
33 y/o f 14 weeks gestation, LMP 18 ,  presents to ED c/o noticing brownish staining when wipes for one week. Denies heavy bleeding, clots, cramping, dysuria, sob, chest pain, abd pain. Next appt 18.

## 2018-05-17 NOTE — ED PROVIDER NOTE - MEDICAL DECISION MAKING DETAILS
Patient with minimal vaginal bleeding with + , O+. Sonogram shows fetal activity . UA + will start oral abx therapy. Recommend gyn f/u , avoid sexual activity until cleared by gyn.

## 2018-05-18 LAB
CULTURE RESULTS: NO GROWTH — SIGNIFICANT CHANGE UP
SPECIMEN SOURCE: SIGNIFICANT CHANGE UP

## 2018-05-22 LAB — OXCARBAZEPINE SERPL-MCNC: 18 UG/ML

## 2018-05-25 ENCOUNTER — APPOINTMENT (OUTPATIENT)
Dept: HEART AND VASCULAR | Facility: CLINIC | Age: 33
End: 2018-05-25

## 2018-06-04 ENCOUNTER — APPOINTMENT (OUTPATIENT)
Dept: NEUROLOGY | Facility: CLINIC | Age: 33
End: 2018-06-04

## 2018-06-06 ENCOUNTER — APPOINTMENT (OUTPATIENT)
Dept: NEUROLOGY | Facility: CLINIC | Age: 33
End: 2018-06-06

## 2018-06-13 ENCOUNTER — APPOINTMENT (OUTPATIENT)
Dept: NEUROLOGY | Facility: CLINIC | Age: 33
End: 2018-06-13
Payer: MEDICAID

## 2018-06-13 VITALS
TEMPERATURE: 98 F | SYSTOLIC BLOOD PRESSURE: 143 MMHG | BODY MASS INDEX: 24.1 KG/M2 | WEIGHT: 159 LBS | OXYGEN SATURATION: 99 % | DIASTOLIC BLOOD PRESSURE: 88 MMHG | HEIGHT: 68 IN | HEART RATE: 91 BPM

## 2018-06-13 PROCEDURE — 99214 OFFICE O/P EST MOD 30 MIN: CPT

## 2018-06-18 ENCOUNTER — APPOINTMENT (OUTPATIENT)
Dept: NEUROLOGY | Facility: CLINIC | Age: 33
End: 2018-06-18
Payer: MEDICAID

## 2018-06-18 PROCEDURE — 95816 EEG AWAKE AND DROWSY: CPT

## 2018-06-19 PROCEDURE — 95953: CPT

## 2018-06-20 ENCOUNTER — APPOINTMENT (OUTPATIENT)
Dept: NEUROLOGY | Facility: CLINIC | Age: 33
End: 2018-06-20

## 2018-06-20 PROCEDURE — 95953: CPT

## 2018-06-22 ENCOUNTER — APPOINTMENT (OUTPATIENT)
Dept: HEART AND VASCULAR | Facility: CLINIC | Age: 33
End: 2018-06-22
Payer: MEDICAID

## 2018-06-22 VITALS
HEART RATE: 91 BPM | DIASTOLIC BLOOD PRESSURE: 61 MMHG | WEIGHT: 164 LBS | HEIGHT: 68 IN | OXYGEN SATURATION: 99 % | SYSTOLIC BLOOD PRESSURE: 124 MMHG | BODY MASS INDEX: 24.86 KG/M2

## 2018-06-22 PROCEDURE — 99214 OFFICE O/P EST MOD 30 MIN: CPT

## 2018-06-25 LAB — OXCARBAZEPINE SERPL-MCNC: 22 UG/ML

## 2018-07-18 ENCOUNTER — APPOINTMENT (OUTPATIENT)
Dept: NEUROLOGY | Facility: CLINIC | Age: 33
End: 2018-07-18
Payer: MEDICAID

## 2018-07-18 VITALS
WEIGHT: 168 LBS | DIASTOLIC BLOOD PRESSURE: 78 MMHG | OXYGEN SATURATION: 99 % | SYSTOLIC BLOOD PRESSURE: 131 MMHG | BODY MASS INDEX: 25.46 KG/M2 | HEART RATE: 98 BPM | HEIGHT: 68 IN

## 2018-07-18 PROCEDURE — 99215 OFFICE O/P EST HI 40 MIN: CPT

## 2018-07-19 PROBLEM — G45.9 TRANSIENT CEREBRAL ISCHEMIC ATTACK, UNSPECIFIED: Chronic | Status: ACTIVE | Noted: 2017-05-31

## 2018-07-23 ENCOUNTER — OTHER (OUTPATIENT)
Age: 33
End: 2018-07-23

## 2018-07-23 ENCOUNTER — APPOINTMENT (OUTPATIENT)
Dept: HEART AND VASCULAR | Facility: CLINIC | Age: 33
End: 2018-07-23
Payer: MEDICAID

## 2018-07-23 VITALS — SYSTOLIC BLOOD PRESSURE: 133 MMHG | OXYGEN SATURATION: 99 % | HEART RATE: 93 BPM | DIASTOLIC BLOOD PRESSURE: 64 MMHG

## 2018-07-23 DIAGNOSIS — D50.9 IRON DEFICIENCY ANEMIA, UNSPECIFIED: ICD-10-CM

## 2018-07-23 LAB — OXCARBAZEPINE SERPL-MCNC: 25 UG/ML

## 2018-07-23 PROCEDURE — 99214 OFFICE O/P EST MOD 30 MIN: CPT

## 2018-08-01 ENCOUNTER — RX RENEWAL (OUTPATIENT)
Age: 33
End: 2018-08-01

## 2018-08-22 ENCOUNTER — APPOINTMENT (OUTPATIENT)
Dept: NEUROLOGY | Facility: CLINIC | Age: 33
End: 2018-08-22
Payer: MEDICAID

## 2018-08-22 ENCOUNTER — APPOINTMENT (OUTPATIENT)
Dept: HEART AND VASCULAR | Facility: CLINIC | Age: 33
End: 2018-08-22
Payer: MEDICAID

## 2018-08-22 VITALS
WEIGHT: 177 LBS | SYSTOLIC BLOOD PRESSURE: 135 MMHG | HEART RATE: 88 BPM | OXYGEN SATURATION: 100 % | BODY MASS INDEX: 26.83 KG/M2 | DIASTOLIC BLOOD PRESSURE: 88 MMHG | HEIGHT: 68 IN

## 2018-08-22 VITALS
OXYGEN SATURATION: 100 % | HEART RATE: 93 BPM | DIASTOLIC BLOOD PRESSURE: 80 MMHG | SYSTOLIC BLOOD PRESSURE: 161 MMHG | HEIGHT: 68 IN | WEIGHT: 177 LBS | BODY MASS INDEX: 26.83 KG/M2

## 2018-08-22 VITALS — DIASTOLIC BLOOD PRESSURE: 86 MMHG | SYSTOLIC BLOOD PRESSURE: 154 MMHG

## 2018-08-22 PROCEDURE — 99214 OFFICE O/P EST MOD 30 MIN: CPT

## 2018-08-28 LAB — OXCARBAZEPINE SERPL-MCNC: 9 UG/ML

## 2018-08-30 ENCOUNTER — APPOINTMENT (OUTPATIENT)
Dept: NEUROLOGY | Facility: CLINIC | Age: 33
End: 2018-08-30

## 2018-09-05 ENCOUNTER — APPOINTMENT (OUTPATIENT)
Dept: NEUROLOGY | Facility: CLINIC | Age: 33
End: 2018-09-05
Payer: MEDICAID

## 2018-09-05 VITALS
DIASTOLIC BLOOD PRESSURE: 100 MMHG | OXYGEN SATURATION: 100 % | HEIGHT: 68 IN | WEIGHT: 167 LBS | BODY MASS INDEX: 25.31 KG/M2 | HEART RATE: 88 BPM | SYSTOLIC BLOOD PRESSURE: 150 MMHG

## 2018-09-05 PROCEDURE — 99215 OFFICE O/P EST HI 40 MIN: CPT

## 2018-09-11 LAB — OXCARBAZEPINE SERPL-MCNC: 29 UG/ML

## 2018-09-12 ENCOUNTER — APPOINTMENT (OUTPATIENT)
Dept: HEART AND VASCULAR | Facility: CLINIC | Age: 33
End: 2018-09-12
Payer: MEDICAID

## 2018-09-12 VITALS — DIASTOLIC BLOOD PRESSURE: 93 MMHG | SYSTOLIC BLOOD PRESSURE: 144 MMHG | OXYGEN SATURATION: 99 % | HEART RATE: 85 BPM

## 2018-09-12 PROCEDURE — 99214 OFFICE O/P EST MOD 30 MIN: CPT

## 2018-09-12 PROCEDURE — 93000 ELECTROCARDIOGRAM COMPLETE: CPT

## 2018-09-12 RX ORDER — FOLIC ACID 1 MG/1
1 TABLET ORAL DAILY
Qty: 360 | Refills: 3 | Status: DISCONTINUED | COMMUNITY
Start: 2017-04-10 | End: 2018-09-12

## 2018-09-12 RX ORDER — LABETALOL HYDROCHLORIDE 100 MG/1
100 TABLET, FILM COATED ORAL
Qty: 60 | Refills: 6 | Status: DISCONTINUED | COMMUNITY
Start: 2018-08-22 | End: 2018-09-12

## 2018-09-12 RX ORDER — LABETALOL HYDROCHLORIDE 300 MG/1
300 TABLET, FILM COATED ORAL
Qty: 60 | Refills: 6 | Status: DISCONTINUED | COMMUNITY
Start: 2018-07-23 | End: 2018-09-12

## 2018-09-12 RX ORDER — LABETALOL HYDROCHLORIDE 200 MG/1
200 TABLET, FILM COATED ORAL TWICE DAILY
Qty: 60 | Refills: 6 | Status: DISCONTINUED | COMMUNITY
Start: 2018-05-11 | End: 2018-09-12

## 2018-09-26 ENCOUNTER — APPOINTMENT (OUTPATIENT)
Dept: NEUROLOGY | Facility: CLINIC | Age: 33
End: 2018-09-26
Payer: MEDICAID

## 2018-09-26 VITALS
HEART RATE: 95 BPM | WEIGHT: 165 LBS | TEMPERATURE: 98.4 F | BODY MASS INDEX: 25.01 KG/M2 | OXYGEN SATURATION: 98 % | HEIGHT: 68 IN | DIASTOLIC BLOOD PRESSURE: 96 MMHG | SYSTOLIC BLOOD PRESSURE: 144 MMHG

## 2018-09-26 PROCEDURE — 99215 OFFICE O/P EST HI 40 MIN: CPT

## 2018-09-26 RX ORDER — OXCARBAZEPINE 600 MG/1
600 TABLET, FILM COATED ORAL
Qty: 90 | Refills: 2 | Status: COMPLETED | COMMUNITY
Start: 2018-08-28 | End: 2018-09-26

## 2018-10-08 ENCOUNTER — APPOINTMENT (OUTPATIENT)
Dept: HEART AND VASCULAR | Facility: CLINIC | Age: 33
End: 2018-10-08
Payer: MEDICAID

## 2018-10-08 VITALS — SYSTOLIC BLOOD PRESSURE: 156 MMHG | DIASTOLIC BLOOD PRESSURE: 91 MMHG | HEART RATE: 89 BPM | OXYGEN SATURATION: 96 %

## 2018-10-08 DIAGNOSIS — I21.9 ACUTE MYOCARDIAL INFARCTION, UNSPECIFIED: ICD-10-CM

## 2018-10-08 PROCEDURE — 99215 OFFICE O/P EST HI 40 MIN: CPT

## 2018-10-08 RX ORDER — DILTIAZEM HYDROCHLORIDE 240 MG/1
240 CAPSULE, EXTENDED RELEASE ORAL DAILY
Qty: 30 | Refills: 11 | Status: DISCONTINUED | COMMUNITY
Start: 2018-09-12 | End: 2018-10-08

## 2018-11-27 ENCOUNTER — APPOINTMENT (OUTPATIENT)
Dept: NEUROLOGY | Facility: CLINIC | Age: 33
End: 2018-11-27
Payer: MEDICAID

## 2018-11-27 ENCOUNTER — NON-APPOINTMENT (OUTPATIENT)
Age: 33
End: 2018-11-27

## 2018-11-27 ENCOUNTER — APPOINTMENT (OUTPATIENT)
Dept: HEART AND VASCULAR | Facility: CLINIC | Age: 33
End: 2018-11-27
Payer: MEDICAID

## 2018-11-27 VITALS
OXYGEN SATURATION: 100 % | BODY MASS INDEX: 25.01 KG/M2 | WEIGHT: 165 LBS | SYSTOLIC BLOOD PRESSURE: 147 MMHG | DIASTOLIC BLOOD PRESSURE: 97 MMHG | HEART RATE: 82 BPM | HEIGHT: 68 IN

## 2018-11-27 VITALS — DIASTOLIC BLOOD PRESSURE: 100 MMHG | SYSTOLIC BLOOD PRESSURE: 140 MMHG

## 2018-11-27 VITALS
HEART RATE: 77 BPM | DIASTOLIC BLOOD PRESSURE: 110 MMHG | WEIGHT: 173 LBS | BODY MASS INDEX: 26.22 KG/M2 | HEIGHT: 68 IN | SYSTOLIC BLOOD PRESSURE: 156 MMHG

## 2018-11-27 PROCEDURE — 99215 OFFICE O/P EST HI 40 MIN: CPT

## 2018-11-27 PROCEDURE — 93000 ELECTROCARDIOGRAM COMPLETE: CPT

## 2018-11-27 PROCEDURE — 99213 OFFICE O/P EST LOW 20 MIN: CPT

## 2018-12-02 NOTE — HISTORY OF PRESENT ILLNESS
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 Bennett) here to establish care. \par \par -She has a history of HTN and Preeclampsia, premature labor x 2 pregnancies (6 mos and 7 mos - both babies ), 11 and 10 years ago, CAD with PTCA/TOMASZ to pLAD 2014, with subsequent instent restenosis in prox LAD stent, s/p TOMASZ with Resolute stent 2015 and s/p another stent for ISR in 3/2016 to pLAD and D1, s/p another cath in 2016, which revealed a patent LAD stent. A new lesion was found in ostial LCx, small vessel, not amenable to intervention and was ballooned. On cath, her coronary arteries are very small and spasm easily. She also has a hx of seizure d/o and bilateral Perez-Perez disease and she is now post surgery (left STA-MCA bypass) on 17. Post procedure, she was found to be ASA resistent, was restarted on Brillinta. She delivered a very  baby at the end of 2018 who remains in the NICU and is now ~6 pounds as per family. \par \par She reports that she continues to get chest pain, but both noted at rest and with exertion. Chest pain is sometimes continuous and at rest. She is not very active lately because she has bee at the hospital much of the time with her child. \par \par Prior relevant notes, imaging and labs reviewed. 
negative...

## 2018-12-02 NOTE — ASSESSMENT
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 Bennett) here to establish care.

## 2018-12-02 NOTE — REVIEW OF SYSTEMS
[Headache] : headache [Dyspnea on exertion] : dyspnea during exertion [Chest Pain] : chest pain [Negative] : Heme/Lymph

## 2018-12-02 NOTE — PHYSICAL EXAM
[General Appearance - Well Developed] : well developed [Normal Appearance] : normal appearance [Well Groomed] : well groomed [General Appearance - Well Nourished] : well nourished [No Deformities] : no deformities [General Appearance - In No Acute Distress] : no acute distress [Normal Conjunctiva] : the conjunctiva exhibited no abnormalities [Eyelids - No Xanthelasma] : the eyelids demonstrated no xanthelasmas [Normal Oral Mucosa] : normal oral mucosa [No Oral Pallor] : no oral pallor [No Oral Cyanosis] : no oral cyanosis [Normal Jugular Venous A Waves Present] : normal jugular venous A waves present [Normal Jugular Venous V Waves Present] : normal jugular venous V waves present [No Jugular Venous Reed A Waves] : no jugular venous reed A waves [Respiration, Rhythm And Depth] : normal respiratory rhythm and effort [Exaggerated Use Of Accessory Muscles For Inspiration] : no accessory muscle use [Auscultation Breath Sounds / Voice Sounds] : lungs were clear to auscultation bilaterally [Heart Rate And Rhythm] : heart rate and rhythm were normal [Heart Sounds] : normal S1 and S2 [Murmurs] : no murmurs present [Abdomen Soft] : soft [Abdomen Tenderness] : non-tender [Abdomen Mass (___ Cm)] : no abdominal mass palpated [Gait - Sufficient For Exercise Testing] : the gait was sufficient for exercise testing [Abnormal Walk] : normal gait [Nail Clubbing] : no clubbing of the fingernails [Cyanosis, Localized] : no localized cyanosis [Petechial Hemorrhages (___cm)] : no petechial hemorrhages [Skin Color & Pigmentation] : normal skin color and pigmentation [] : no rash [No Venous Stasis] : no venous stasis [Skin Lesions] : no skin lesions [No Skin Ulcers] : no skin ulcer [No Xanthoma] : no  xanthoma was observed [Oriented To Time, Place, And Person] : oriented to person, place, and time [No Anxiety] : not feeling anxious

## 2018-12-02 NOTE — DISCUSSION/SUMMARY
[FreeTextEntry1] : Ms. Mesa's history and current life situation are quite complex. \par \par -In light of the Perez Perez, I will reach out to Dr. Morris to understand our goals with blood pressure as she explains that she requires some degree of permissive hypertension. \par -Perhaps a CCB would help with spasm as well. Will discuss optimal agents with Dr. Morris now that she is not pregnant. \par -Elevated Lpa also noted on prior labs, but on optimal statin dose for LDL lowering. Will have her repeat lipids on next visit. \par -She is stable to undergo upcoming angiogram. Would consider a plain treadmill stress test in the future to assess her exercise capacity and prepare her for surgery. \par -Explained that she will need to come up with a mechanism to improve her exercise regimen even if in small amounts. \par -Return in 2 months.

## 2018-12-13 ENCOUNTER — APPOINTMENT (OUTPATIENT)
Dept: NEUROSURGERY | Facility: CLINIC | Age: 33
End: 2018-12-13

## 2018-12-27 ENCOUNTER — APPOINTMENT (OUTPATIENT)
Dept: HEART AND VASCULAR | Facility: CLINIC | Age: 33
End: 2018-12-27

## 2019-01-15 ENCOUNTER — APPOINTMENT (OUTPATIENT)
Dept: NEUROLOGY | Facility: CLINIC | Age: 34
End: 2019-01-15
Payer: MEDICAID

## 2019-01-15 VITALS
DIASTOLIC BLOOD PRESSURE: 87 MMHG | TEMPERATURE: 98 F | HEART RATE: 76 BPM | OXYGEN SATURATION: 100 % | HEIGHT: 68 IN | WEIGHT: 168 LBS | SYSTOLIC BLOOD PRESSURE: 145 MMHG | BODY MASS INDEX: 25.46 KG/M2

## 2019-01-15 DIAGNOSIS — R20.0 ANESTHESIA OF SKIN: ICD-10-CM

## 2019-01-15 PROCEDURE — 99214 OFFICE O/P EST MOD 30 MIN: CPT

## 2019-01-16 PROBLEM — R20.0 LEFT ARM NUMBNESS: Status: ACTIVE | Noted: 2019-01-15

## 2019-02-14 ENCOUNTER — APPOINTMENT (OUTPATIENT)
Dept: NEUROSURGERY | Facility: CLINIC | Age: 34
End: 2019-02-14
Payer: MEDICAID

## 2019-02-14 VITALS
RESPIRATION RATE: 16 BRPM | SYSTOLIC BLOOD PRESSURE: 136 MMHG | HEIGHT: 68 IN | WEIGHT: 168 LBS | DIASTOLIC BLOOD PRESSURE: 81 MMHG | HEART RATE: 88 BPM | BODY MASS INDEX: 25.46 KG/M2 | OXYGEN SATURATION: 99 %

## 2019-02-14 PROCEDURE — 99215 OFFICE O/P EST HI 40 MIN: CPT

## 2019-02-22 ENCOUNTER — NON-APPOINTMENT (OUTPATIENT)
Age: 34
End: 2019-02-22

## 2019-02-22 ENCOUNTER — APPOINTMENT (OUTPATIENT)
Dept: HEART AND VASCULAR | Facility: CLINIC | Age: 34
End: 2019-02-22
Payer: MEDICAID

## 2019-02-22 VITALS
SYSTOLIC BLOOD PRESSURE: 130 MMHG | BODY MASS INDEX: 25.31 KG/M2 | DIASTOLIC BLOOD PRESSURE: 70 MMHG | HEIGHT: 68 IN | WEIGHT: 167 LBS | HEART RATE: 87 BPM

## 2019-02-22 PROCEDURE — 93000 ELECTROCARDIOGRAM COMPLETE: CPT

## 2019-02-22 PROCEDURE — 99214 OFFICE O/P EST MOD 30 MIN: CPT

## 2019-02-22 NOTE — REVIEW OF SYSTEMS
[Dyspnea on exertion] : dyspnea during exertion [Chest Pain] : chest pain [Numbness (Hypesthesia)] : numbness [Negative] : Heme/Lymph [FreeTextEntry2] : left face and arm numbness intermittent

## 2019-02-22 NOTE — DISCUSSION/SUMMARY
[FreeTextEntry1] : Her blood pressures appear to be much better controlled at this time. Encouraged to continue healthy dietary habits and walking. \par Will get a modified treadmill stress test to assess exercise capacity and what is limiting her- whether blood pressure, oxygenation, heart rate specifically. \par Agree with upcoming angiogram scheduled for March in light of her seizures and numbness for assessment of grafts. Discussed briefly with Dr. Morris.

## 2019-02-22 NOTE — ASSESSMENT
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 Bennett) here for follow-up.

## 2019-02-22 NOTE — PHYSICAL EXAM
[General Appearance - Well Developed] : well developed [Normal Appearance] : normal appearance [Well Groomed] : well groomed [General Appearance - Well Nourished] : well nourished [No Deformities] : no deformities [General Appearance - In No Acute Distress] : no acute distress [Normal Conjunctiva] : the conjunctiva exhibited no abnormalities [Eyelids - No Xanthelasma] : the eyelids demonstrated no xanthelasmas [Normal Oral Mucosa] : normal oral mucosa [No Oral Pallor] : no oral pallor [No Oral Cyanosis] : no oral cyanosis [Normal Jugular Venous A Waves Present] : normal jugular venous A waves present [Normal Jugular Venous V Waves Present] : normal jugular venous V waves present [No Jugular Venous Reed A Waves] : no jugular venous reed A waves [Respiration, Rhythm And Depth] : normal respiratory rhythm and effort [Exaggerated Use Of Accessory Muscles For Inspiration] : no accessory muscle use [Auscultation Breath Sounds / Voice Sounds] : lungs were clear to auscultation bilaterally [Heart Rate And Rhythm] : heart rate and rhythm were normal [Heart Sounds] : normal S1 and S2 [Murmurs] : no murmurs present [Abdomen Soft] : soft [Abdomen Tenderness] : non-tender [Abdomen Mass (___ Cm)] : no abdominal mass palpated [Abnormal Walk] : normal gait [Gait - Sufficient For Exercise Testing] : the gait was sufficient for exercise testing [Nail Clubbing] : no clubbing of the fingernails [Cyanosis, Localized] : no localized cyanosis [Petechial Hemorrhages (___cm)] : no petechial hemorrhages [Skin Color & Pigmentation] : normal skin color and pigmentation [] : no rash [No Venous Stasis] : no venous stasis [Skin Lesions] : no skin lesions [No Skin Ulcers] : no skin ulcer [No Xanthoma] : no  xanthoma was observed [Oriented To Time, Place, And Person] : oriented to person, place, and time [Affect] : the affect was normal [Mood] : the mood was normal [No Anxiety] : not feeling anxious

## 2019-02-22 NOTE — HISTORY OF PRESENT ILLNESS
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 Bennett) here for follow-up. \par \par -She has a history of HTN and Preeclampsia, premature labor x 2 pregnancies (6 mos and 7 mos - both babies ), 11 and 10 years ago, CAD with PTCA/TOMASZ to pLAD 2014, with subsequent in-stent restenosis in prox LAD stent, s/p TOMASZ with Resolute stent 2015 and s/p another stent for ISR in 3/2016 to pLAD and D1, s/p another cath in 2016, which revealed a patent LAD stent. A new lesion was found in ostial LCx, small vessel, not amenable to intervention and was ballooned. On cath, her coronary arteries are very small and spasm easily. She also has a hx of seizure d/o and bilateral Perez-Perez disease and she is now post surgery (left STA-MCA bypass) on 17. Post procedure, she was found to be ASA resistent, was restarted on Brillinta. She delivered a very  baby at the end of 2018 who is now at home and thriving. \par \par She reports that she continues to get chest pain mainly on awakening in am and gets very short of breath with stairs and therefore avoids significant exertion. She reports that she is eager to stay healthy to be able to care for her child. \par

## 2019-03-01 NOTE — HISTORY OF PRESENT ILLNESS
[FreeTextEntry1] : 33 year-old female presents for follow up of Perez-Perez disease now that she has given birth.  She followed up with Dr. Walter in November for her seizures.\par \par 1) Seizures - \par She had 3 seizures with slow speech during seizures.  They occurred (2 in December and 1 in January) after she saw Dr. Walter so Dr. Walter unaware.  She's compliant with Trileptal.  \par - One had associated headache on left side of head with numbness of left hand and gums.\par \par The only change to her medication is antihypertensives.\par \par 2) Perez-Perez disease - No new weakness. \par - She has cramping her left arm with extension up her arm.\par - Had numbness in her left thumb compared to other side\par \par - She has cerebral angiogram scheduled for February 14th with Dr. Morris.\par \par 3) Fatigue - She thinks that she's tired part of the morning and later in the day when the sun goes down.  She's sleeping most nights except when she needs to wake for the baby.  She's not sure if related to her medications.

## 2019-03-01 NOTE — ASSESSMENT
[FreeTextEntry1] : 33 year-old female with PMH Perez-Perez disease presents for follow up.  She also reports several new seizures. She's doing well neurologically on exam without deficits.\par \par Plan for seizures - \par 1) Ordered Trileptal level\par 2) She should follow up with Dr. Watler post maternity leave \par \par Plan for Perez-Perez disease - \par 1) Stressed importance of following up with Dr. Evin Morris for cerebral angiogram now that she's given birth.  She is still a possible candidate for intervention given her previous workup for Perez-Perez disease and persistence of neurological complaints.\par 2) Ordered Provigil 100mg daily to help with her fatigue.\par 3) She's on Brilinta 60mg BID for antiplatelet and Atorvastatin 80mg for statin\par 4) Blood pressure - needs to discuss better blood pressure control with her Cardiologist.\par 5) Ordered MRA Head without anselmo NOVA for further evaluation of her vasculature prior to appointment with Dr. Morris.

## 2019-03-01 NOTE — PHYSICAL EXAM
[FreeTextEntry1] : Physical exam:\par General - sitting in exam chair, NAD\par Eyes: anicteric sclera\par CV: RRR\par Extremities: 2+ radial pulses bilaterally\par \par Neurological exam: \par Mental Status -  AAOx3, speech fluent, no dysarthria. Naming and repetition intact. Follows 3 step commands, remote and recent memory intact. Fund of knowledge full.\par Cranial Nerves - EOMI, VFF, face symmetric, V1-V3 intact, tongue midline\par Motor - No pronator drift, 5/5 x 4, normal bulk and tone\par Sensory - light touch and pinprick intact throughout\par Reflexes- 2+ throughout\par Coordination- finger-nose intact bilaterally, no tremor or dysmetria\par Gait- steady

## 2019-03-15 ENCOUNTER — OUTPATIENT (OUTPATIENT)
Dept: OUTPATIENT SERVICES | Facility: HOSPITAL | Age: 34
LOS: 1 days | Discharge: HOME | End: 2019-03-15
Payer: MEDICAID

## 2019-03-15 DIAGNOSIS — T82.897A OTHER SPECIFIED COMPLICATION OF CARDIAC PROSTHETIC DEVICES, IMPLANTS AND GRAFTS, INITIAL ENCOUNTER: Chronic | ICD-10-CM

## 2019-03-15 DIAGNOSIS — I67.5 MOYAMOYA DISEASE: ICD-10-CM

## 2019-03-15 LAB
HCG SERPL-ACNC: <.1 MIU/ML — SIGNIFICANT CHANGE UP
OXCARBAZEPINE SERPL-MCNC: 34 UG/ML

## 2019-03-15 PROCEDURE — 36224 PLACE CATH CAROTD ART: CPT | Mod: RT

## 2019-03-15 PROCEDURE — C1894: CPT

## 2019-03-15 PROCEDURE — 84702 CHORIONIC GONADOTROPIN TEST: CPT

## 2019-03-15 PROCEDURE — C1760: CPT

## 2019-03-15 PROCEDURE — C1769: CPT

## 2019-03-15 PROCEDURE — C1725: CPT

## 2019-03-15 PROCEDURE — 36223 PLACE CATH CAROTID/INOM ART: CPT | Mod: LT

## 2019-03-15 PROCEDURE — 36226 PLACE CATH VERTEBRAL ART: CPT | Mod: LT

## 2019-03-15 PROCEDURE — 36227 PLACE CATH XTRNL CAROTID: CPT | Mod: RT

## 2019-03-15 PROCEDURE — 36223 PLACE CATH CAROTID/INOM ART: CPT | Mod: 59,LT

## 2019-03-15 RX ORDER — CHLORHEXIDINE GLUCONATE 213 G/1000ML
1 SOLUTION TOPICAL ONCE
Qty: 0 | Refills: 0 | Status: DISCONTINUED | OUTPATIENT
Start: 2019-03-15 | End: 2019-03-30

## 2019-03-15 RX ORDER — SODIUM CHLORIDE 9 MG/ML
1000 INJECTION INTRAMUSCULAR; INTRAVENOUS; SUBCUTANEOUS
Qty: 0 | Refills: 0 | Status: DISCONTINUED | OUTPATIENT
Start: 2019-03-15 | End: 2019-03-30

## 2019-03-15 NOTE — BRIEF OPERATIVE NOTE - NSICDXBRIEFPROCEDURE_GEN_ALL_CORE_FT
PROCEDURES:  Angiography of bilateral carotid or cerebral arteries 15-Mar-2019 12:53:14  Diego Zhang

## 2019-03-15 NOTE — PROGRESS NOTE ADULT - SUBJECTIVE AND OBJECTIVE BOX
Neuroendovascular Pre-Op Note    HPI: 33 year old Khmer speaking female w/ CAD s/p stents, Perez-Perez Disease s/p right STA-MCA bypass 2017, HTN, HLD presents today for follow-up cerebral angiography. Neuroendovascular Pre-Op Note    HPI: 33 year old Rw[place]an speaking female w/ CAD s/p stents, HTN, HLD, Seizure d/o, b/l Perez-Perez Disease s/p left STA-MCA direct/indirect bypass 2017, HTN, HLD presents today for follow-up cerebral angiography. Patient reports doing well post bypass with no headaches, visual changes, extremity weakness, gait changes or difficulties with ADLs. She is presently on Brilinta and consistent with taking it - she has history of Aspirin resistance. R[place]andan translation provided by nursing staff at bedside.    She also reports one week of neck pain with left arm pain without paresthesias or weakness. Her outpatient clearance also reports chest pain in AM when wakening and SOB with significant exertion. She takes Nitroglycerin as needed.      PMH: HTN, HLD, h/o MI, CAD s/p cardiac stents, GERD, MELISSA, b/l Perez Perez Disease, Seizure d/o, Preeclampsia, h/o  labor x3 ()  PSH: Left STA-MCA bypass , cardiac stents , , .  SocHx: Currently has one child delivered 2018  but doing well, denies smoking ETOH abuse or illicit drug use.  Allergies: NKDA  Medications: Atorvastatin 80mg daily, Brilinta 60mg BID, Isosorbide Mononitrate ER 60mg AM, 30mg bedtime, Labetalol 200mg BID, Oxcarbezapine 600mg BID, Ranexa 1000mg BID, Nitroglycerin 0.4mg SL PRN chest pain.    Exam:   Gen: NAD, AAOx3, Rwandan speaking  HEENT: PERRL. EOMI. VF grossly intact. Left scalp incision well healed  Neck: FROM, nontender  Lungs: Clear b/l  Heart: S1, S2. NSR.  Abd: Soft, NT/ND. +BS  Exts: Pulses 2+ throughout  Neuro: CNs II-XII intact. 5/5 str x4 extremities. Sensation to LT intact. Following commands. Gait intact. Speech clear.

## 2019-03-15 NOTE — BRIEF OPERATIVE NOTE - OPERATION/FINDINGS
Femoral cerebral angiogram performed under MAC anesthesia via right CFA using a 5Fr short sheath. Three vessel angiogram performed. Left vertebral injection demonstrating right PCA flow into right DAVID distribution. Left ICA with string sign, left CCA injection demonstrates previously known thrombosed left STA-MCA bypass graft with indirect bypass vasculature providing flow to the left hemisphere minus the left DAVID. Right ICA injection with narrowed ICA, occluded MCA/DAVID branches with Perez vessels providing distal flow. Right ECA injection demonstrates filling of the left DAVID via the right MMA and right Occipital artery. Right groin failed perclose attempt due to deep CFA. Manual compression for 20 minutes with hemostasis obtained. Patient remained hemodynamically and neurologically stable throughout.    Full report to follow, d/w Dr. Morris

## 2019-03-15 NOTE — PROGRESS NOTE ADULT - ASSESSMENT
33 year old English speaking female with multiple medical problems, s/p left STA-MCA bypass 05/2017 presenting today for diagnostic cerebral angiography.

## 2019-03-15 NOTE — PROGRESS NOTE ADULT - NSICDXPROBLEM_GEN_ALL_CORE_FT
PROBLEM DIAGNOSES  Problem: Sandra sandra disease  Assessment and Plan: Plan for diagnostic cerebral angiogram,  Consent in chart, all R/B/A discussed with translation provided by nursing at bedside,  Outpatient medical clearance reviewed,  Serum HCG negative,  D/w Dr. Morris

## 2019-03-21 DIAGNOSIS — Z95.5 PRESENCE OF CORONARY ANGIOPLASTY IMPLANT AND GRAFT: ICD-10-CM

## 2019-03-21 DIAGNOSIS — I67.5 MOYAMOYA DISEASE: ICD-10-CM

## 2019-03-21 DIAGNOSIS — I10 ESSENTIAL (PRIMARY) HYPERTENSION: ICD-10-CM

## 2019-03-21 DIAGNOSIS — K21.9 GASTRO-ESOPHAGEAL REFLUX DISEASE WITHOUT ESOPHAGITIS: ICD-10-CM

## 2019-03-21 DIAGNOSIS — D50.9 IRON DEFICIENCY ANEMIA, UNSPECIFIED: ICD-10-CM

## 2019-03-21 DIAGNOSIS — Z86.73 PERSONAL HISTORY OF TRANSIENT ISCHEMIC ATTACK (TIA), AND CEREBRAL INFARCTION WITHOUT RESIDUAL DEFICITS: ICD-10-CM

## 2019-03-21 DIAGNOSIS — G40.909 EPILEPSY, UNSPECIFIED, NOT INTRACTABLE, WITHOUT STATUS EPILEPTICUS: ICD-10-CM

## 2019-03-21 DIAGNOSIS — I65.23 OCCLUSION AND STENOSIS OF BILATERAL CAROTID ARTERIES: ICD-10-CM

## 2019-03-21 DIAGNOSIS — I25.2 OLD MYOCARDIAL INFARCTION: ICD-10-CM

## 2019-03-21 DIAGNOSIS — E78.5 HYPERLIPIDEMIA, UNSPECIFIED: ICD-10-CM

## 2019-03-25 ENCOUNTER — APPOINTMENT (OUTPATIENT)
Dept: HEART AND VASCULAR | Facility: CLINIC | Age: 34
End: 2019-03-25
Payer: MEDICAID

## 2019-03-25 VITALS
HEIGHT: 68 IN | DIASTOLIC BLOOD PRESSURE: 84 MMHG | WEIGHT: 170 LBS | BODY MASS INDEX: 25.76 KG/M2 | HEART RATE: 87 BPM | SYSTOLIC BLOOD PRESSURE: 144 MMHG

## 2019-03-25 PROCEDURE — 93015 CV STRESS TEST SUPVJ I&R: CPT

## 2019-03-25 PROCEDURE — 99213 OFFICE O/P EST LOW 20 MIN: CPT | Mod: 25

## 2019-03-25 NOTE — REASON FOR VISIT
[FreeTextEntry1] : 33 year old female with Perez Perez disease, HTN/preeclampsia, CAD, here for follow-up and stress test.  Dr. Lynch (OB)

## 2019-03-25 NOTE — HISTORY OF PRESENT ILLNESS
[FreeTextEntry1] : 33 year old female with Perez Perez disease here for follow-up and stress test. \par \par She reports mild chest discomfort only intermittently and not always with exertion. She also reports that she has mild lightheadedness intermittently. She has been ambulating a lot and denies any significant limitations. \par \par -She has a PMHx of HTN and Preeclampsia, premature labor x 2 pregnancies (6 mos and 7 mos - both babies ), 11 and 10 years ago, CAD with PTCA/TOMASZ to pLAD 2014, with subsequent in-stent restenosis in prox LAD stent, s/p TOMASZ with Resolute stent 2015 and s/p another stent for ISR in 3/2016 to pLAD and D1, s/p another cath in 2016, which revealed a patent LAD stent. A new lesion was found in ostial LCx, small vessel, not amenable to intervention and was ballooned. On cath, her coronary arteries are very small and spasm easily. She also has a hx of seizure d/o and bilateral Perez-Perez disease and she is now post surgery (left STA-MCA bypass) on 17. Post procedure, she was found to be ASA resistant, was restarted on Brillinta.\par \par Patient presents today for a stress test to further evaluate chest pain and GALLOWAY. \par \par

## 2019-03-25 NOTE — DISCUSSION/SUMMARY
[FreeTextEntry1] : Ms. Mesa's exercise capacity on a modified Jordan treadmill stress test was better than I expected. She appeared to be able to do more exercise, but this was clearly more than she was used to and appeared mildly anxious. \par \par Suggested that she continues to push herself intermittently to improve her exercise capacity. \par No concerning symptoms. \par Return in 3 months. \par

## 2019-04-04 ENCOUNTER — APPOINTMENT (OUTPATIENT)
Dept: NEUROSURGERY | Facility: CLINIC | Age: 34
End: 2019-04-04
Payer: MEDICAID

## 2019-04-04 VITALS
RESPIRATION RATE: 18 BRPM | HEIGHT: 68 IN | DIASTOLIC BLOOD PRESSURE: 83 MMHG | SYSTOLIC BLOOD PRESSURE: 142 MMHG | BODY MASS INDEX: 25.76 KG/M2 | WEIGHT: 170 LBS | HEART RATE: 73 BPM | OXYGEN SATURATION: 99 %

## 2019-04-04 PROCEDURE — 99214 OFFICE O/P EST MOD 30 MIN: CPT

## 2019-04-08 NOTE — ADDENDUM
[FreeTextEntry1] : April 4, 2019\par \par Evin Morris MD\par [facility]\par Department of Neurology and Neurosurgery\par [address], [address], [place] [address]\par \par Re:	Leyla Mesa \par \par Dear Germain:\par \par As you know, we met with Leyla and her mom in the office today.  A postoperative angiogram performed last month showed evidence of robust indirect graft maturity with significant hemispheric revascularization on the left-hand side.  As you know, Leyla has a hypercoagulable state and direct grafting is likely high risk.  We have recommended a right-sided indirect revascularization for her right-sided moyamoya disease, and we had a long discussion regarding the risks, benefits, and alternatives to treatment.  Leyla will get back to us regarding her decision and is likely to return to the [facility] this month with followup sometime in the early summer.  \par \par I will certainly keep you abreast of her progress and appreciate your contribution to her care.\par \par Sincerely,\par \par \par \par Mike Long MD\par \par DL/ag [facility] #[id number]\par \par \par

## 2019-04-08 NOTE — HISTORY OF PRESENT ILLNESS
[FreeTextEntry1] :  used ID # [id number].\par She is doing well since LEFT STA_MCA Bypass surgery 2017. Denies h/a, n/v or vision changes. \par She denies any evidence of stroke syndrome

## 2019-04-08 NOTE — REASON FOR VISIT
[Follow-Up: _____] : a [unfilled] follow-up visit [Parent] : parent [FreeTextEntry1] : INTERVAL CHANGE; NEW BORN BABY Girl\par S/P CEREBRAL ANGIOGRAM 3/15/19\par Here for discussion concerning surgery\par Denies any evidence of new stroke syndrome

## 2019-04-08 NOTE — ASSESSMENT
[FreeTextEntry1] : S/P LEFT STA-MCA ByPASS 2017; \par SURGERY RECOMMENDED: RIGHT EDAS BYPASS. pt will contact us when ready to proceed.\par S/P recent Cerebral Angiogram 3/15/19\par Doing well without evidence of stroke\par Education provided regarding plan of care.\par

## 2019-04-08 NOTE — PHYSICAL EXAM
[General Appearance - Alert] : alert [General Appearance - In No Acute Distress] : in no acute distress [Oriented To Time, Place, And Person] : oriented to person, place, and time [Impaired Insight] : insight and judgment were intact [Affect] : the affect was normal [Mood] : the mood was normal [Cranial Nerves Optic (II)] : visual acuity intact bilaterally,  pupils equal round and reactive to light [Cranial Nerves Oculomotor (III)] : extraocular motion intact [Cranial Nerves Trigeminal (V)] : facial sensation intact symmetrically [Cranial Nerves Facial (VII)] : face symmetrical [Cranial Nerves Vestibulocochlear (VIII)] : hearing was intact bilaterally [Cranial Nerves Glossopharyngeal (IX)] : tongue and palate midline [Cranial Nerves Accessory (XI - Cranial And Spinal)] : head turning and shoulder shrug symmetric [Cranial Nerves Hypoglossal (XII)] : there was no tongue deviation with protrusion [Motor Tone] : muscle tone was normal in all four extremities [Motor Strength] : muscle strength was normal in all four extremities [Sensation Tactile Decrease] : light touch was intact [Intact] : all sensory within normal limits bilaterally [Sclera] : the sclera and conjunctiva were normal [Neck Appearance] : the appearance of the neck was normal [] : no respiratory distress [Abdomen Soft] : soft [Abnormal Walk] : normal gait [Skin Color & Pigmentation] : normal skin color and pigmentation

## 2019-04-09 ENCOUNTER — APPOINTMENT (OUTPATIENT)
Dept: NEUROLOGY | Facility: CLINIC | Age: 34
End: 2019-04-09
Payer: MEDICAID

## 2019-04-09 VITALS
HEART RATE: 87 BPM | SYSTOLIC BLOOD PRESSURE: 143 MMHG | OXYGEN SATURATION: 98 % | BODY MASS INDEX: 25.76 KG/M2 | DIASTOLIC BLOOD PRESSURE: 85 MMHG | HEIGHT: 68 IN | WEIGHT: 170 LBS

## 2019-04-09 PROCEDURE — 99214 OFFICE O/P EST MOD 30 MIN: CPT

## 2019-04-09 NOTE — ASSESSMENT
[FreeTextEntry1] : 33 year-old female presents for follow up of seizures and Perez-Perez disease. She's doing well neurologically on exam without deficits.\par \par Plan for seizures - \par 1) Continue current Trileptal dose, 900mg BID, since her Trileptal level was close to high level of normal.  She should also take Folic acid 1mg for neural protection.\par 2) She should follow up with Dr. Walter post maternity leave in regard to dose adjustment.\par \par Plan for Perez-Perez disease - \par 1) Neurologically cleared for intervention on right.\par 2) Continue Brilinta 60mg BID for antiplatelet and Atorvastatin 80mg for statin\par 3) Blood pressure - needs better blood pressure control, deferred to her Cardiologist.\par I will follow up with her either in the hospital post procedure or within 3 months.

## 2019-04-09 NOTE — DATA REVIEWED
[de-identified] : Labs:\par 1/15/2019 - Oxacarbazepine 34 (10-35)\par \par 2/21/2019 - \par CBC, CMP - WNL\par Lipid profile: LDL 75, HDL 46, Triglycerides 73, Total 137\par TSH 1.59\par Vitamin B12 376\par Hemoglobin A1C 5.2%

## 2019-04-09 NOTE — HISTORY OF PRESENT ILLNESS
[FreeTextEntry1] : 33 year-old female presents for follow up of seizures and Perez-Perez disease.  \par \par 1) Seizures - She had 2 seizures since last visit (1 in March and 1 in April) that presented with aura consisting of generalized fatigue and gum cramping.  She had bilateral muscle cramping and shaking for less than 15 minutes.  Some shortness of breath.  These were relatively similar to her previous seizures.  She's compliant with Trileptal.  \par \par 2) Perez-Perez disease - No new weakness.  Has numbness in her left thumb compared to other side\par - She had cerebral angiogram in March to evaluate for surgery on second side.  It confirmed good blood flow through her bypass on the left.  She has since followed up with Rachel Morris and Ethan.  The next surgery is planned once she returns from [place] in about 2 months.\par - Compliant with Brilinta and Atorvastatin.  No bleeding in urine or stool.

## 2019-04-09 NOTE — REVIEW OF SYSTEMS
[As Noted in HPI] : as noted in HPI [Negative] : Cardiovascular [FreeTextEntry2] : General fatigue without specific weakness or numbness.

## 2019-07-09 ENCOUNTER — APPOINTMENT (OUTPATIENT)
Dept: NEUROLOGY | Facility: CLINIC | Age: 34
End: 2019-07-09

## 2019-09-23 ENCOUNTER — APPOINTMENT (OUTPATIENT)
Dept: HEART AND VASCULAR | Facility: CLINIC | Age: 34
End: 2019-09-23
Payer: MEDICAID

## 2019-09-23 ENCOUNTER — NON-APPOINTMENT (OUTPATIENT)
Age: 34
End: 2019-09-23

## 2019-09-23 VITALS
DIASTOLIC BLOOD PRESSURE: 80 MMHG | WEIGHT: 163 LBS | SYSTOLIC BLOOD PRESSURE: 124 MMHG | HEART RATE: 80 BPM | BODY MASS INDEX: 24.71 KG/M2 | HEIGHT: 68 IN

## 2019-09-23 PROCEDURE — 93000 ELECTROCARDIOGRAM COMPLETE: CPT

## 2019-09-23 PROCEDURE — 99215 OFFICE O/P EST HI 40 MIN: CPT

## 2019-09-23 NOTE — PHYSICAL EXAM
[General Appearance - Well Developed] : well developed [Normal Appearance] : normal appearance [Well Groomed] : well groomed [No Deformities] : no deformities [General Appearance - Well Nourished] : well nourished [General Appearance - In No Acute Distress] : no acute distress [Normal Conjunctiva] : the conjunctiva exhibited no abnormalities [Normal Oral Mucosa] : normal oral mucosa [Normal Oropharynx] : normal oropharynx [Respiration, Rhythm And Depth] : normal respiratory rhythm and effort [Exaggerated Use Of Accessory Muscles For Inspiration] : no accessory muscle use [Auscultation Breath Sounds / Voice Sounds] : lungs were clear to auscultation bilaterally [Heart Sounds] : normal S1 and S2 [Heart Rate And Rhythm] : heart rate and rhythm were normal [Murmurs] : no murmurs present [Arterial Pulses Normal] : the arterial pulses were normal [Edema] : no peripheral edema present [Abdomen Tenderness] : non-tender [Abdomen Soft] : soft [Abnormal Walk] : normal gait [Nail Clubbing] : no clubbing of the fingernails [Skin Color & Pigmentation] : normal skin color and pigmentation [Cyanosis, Localized] : no localized cyanosis [Oriented To Time, Place, And Person] : oriented to person, place, and time [Skin Turgor] : normal skin turgor [] : no rash [Affect] : the affect was normal [Impaired Insight] : insight and judgment were intact [No Anxiety] : not feeling anxious [Mood] : the mood was normal [Gait - Sufficient For Exercise Testing] : the gait was sufficient for exercise testing

## 2019-09-23 NOTE — REVIEW OF SYSTEMS
[Feeling Fatigued] : feeling fatigued [see HPI] : see HPI [Chest  Pressure] : chest pressure [Chest Pain] : chest pain [Negative] : Heme/Lymph

## 2019-09-24 NOTE — DISCUSSION/SUMMARY
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 Will discuss with both Dr. Barrientos, and Dr. Bennett best way to proceed in working up her symptoms with either CTA vs cardiac cath since the culprit is likely a small vessel that may not be amenable to PCI. Patient would rather avoid having another cath. \par \par # Fatigue\par - Mentions symptoms are soon after taking her meds. From her list, the 2 meds that could explain her symptoms include labetalol and oxcarbazepine. Counseled patient to spread meds apart by at lease 2hrs and try to identify which one is the one making her symptomatic. Will discuss with Dr. Morris if switching labetalol for another beta blocker could be an option for improving her heart rate control and potentially reducing ischemic burden.

## 2019-09-24 NOTE — HISTORY OF PRESENT ILLNESS
[FreeTextEntry1] : 33yo F with Perez Perez disease, HTN/preeclampsia, CAD, seizures disorder, here for follow-up.\par \par Patient complains of worsening chest pain which is happening more frequently now for the past month and her requiring up to 3 nitro/day. Mentions pain is on exertion, and immediately gets better after using nitro. If she does not use nitro, it takes up to 20min for it to resolve. Denies any other associated symptoms.\par \par Endorses feeling severely fatigued and sleepy usually a few minutes after taking her medications. \par \par Lp(a) in 2016 was 168\par \par Cell [phone]

## 2019-09-24 NOTE — REASON FOR VISIT
[Coronary Artery Disease] : coronary artery disease [Follow-Up - Clinic] : a clinic follow-up of [Medication Management] : Medication management [Hypertension] : hypertension [FreeTextEntry1] : 35yo F with Perez Perez disease, HTN/preeclampsia, CAD, seizures disorder, here for follow-up.

## 2019-09-25 RX ORDER — LABETALOL HYDROCHLORIDE 200 MG/1
200 TABLET, FILM COATED ORAL TWICE DAILY
Qty: 360 | Refills: 3 | Status: DISCONTINUED | COMMUNITY
Start: 2018-10-08 | End: 2019-09-25

## 2019-09-27 NOTE — BRIEF OPERATIVE NOTE - NSICDXBRIEFPOSTOP_GEN_ALL_CORE_FT
POST-OP DIAGNOSIS:  Moyamoya disease 15-Mar-2019 12:52:16  Diego Zhang
NP NOTE:  HPI, ROS, pE of intake doctor reviewed.  Labs reviewed.  Patient is here for c/o depression, seen by psych, given prozac and f/u FSL.

## 2019-10-31 ENCOUNTER — APPOINTMENT (OUTPATIENT)
Dept: NEUROLOGY | Facility: CLINIC | Age: 34
End: 2019-10-31
Payer: MEDICAID

## 2019-10-31 VITALS
DIASTOLIC BLOOD PRESSURE: 95 MMHG | SYSTOLIC BLOOD PRESSURE: 157 MMHG | TEMPERATURE: 98.1 F | HEIGHT: 68 IN | OXYGEN SATURATION: 97 % | BODY MASS INDEX: 24.86 KG/M2 | WEIGHT: 164 LBS | HEART RATE: 108 BPM

## 2019-10-31 DIAGNOSIS — R20.0 ANESTHESIA OF SKIN: ICD-10-CM

## 2019-10-31 PROCEDURE — 99215 OFFICE O/P EST HI 40 MIN: CPT

## 2019-10-31 PROCEDURE — 95819 EEG AWAKE AND ASLEEP: CPT

## 2019-11-01 ENCOUNTER — RX RENEWAL (OUTPATIENT)
Age: 34
End: 2019-11-01

## 2019-11-01 ENCOUNTER — APPOINTMENT (OUTPATIENT)
Dept: HEART AND VASCULAR | Facility: CLINIC | Age: 34
End: 2019-11-01

## 2019-11-01 PROBLEM — R20.0 RIGHT ARM NUMBNESS: Status: ACTIVE | Noted: 2019-11-01

## 2019-11-05 LAB — OXCARBAZEPINE SERPL-MCNC: 32 UG/ML

## 2019-11-07 ENCOUNTER — APPOINTMENT (OUTPATIENT)
Dept: NEUROLOGY | Facility: CLINIC | Age: 34
End: 2019-11-07
Payer: MEDICAID

## 2019-11-08 PROCEDURE — 95953: CPT

## 2019-11-09 ENCOUNTER — APPOINTMENT (OUTPATIENT)
Dept: NEUROLOGY | Facility: CLINIC | Age: 34
End: 2019-11-09

## 2019-11-09 PROCEDURE — 95953: CPT

## 2019-11-13 ENCOUNTER — RX CHANGE (OUTPATIENT)
Age: 34
End: 2019-11-13

## 2020-01-08 NOTE — ED PROVIDER NOTE - TIMING
gradual onset Benzoyl Peroxide Counseling: Patient counseled that medicine may cause skin irritation and bleach clothing.  In the event of skin irritation, the patient was advised to reduce the amount of the drug applied or use it less frequently.   The patient verbalized understanding of the proper use and possible adverse effects of benzoyl peroxide.  All of the patient's questions and concerns were addressed.

## 2020-02-03 ENCOUNTER — RX RENEWAL (OUTPATIENT)
Age: 35
End: 2020-02-03

## 2020-02-06 ENCOUNTER — APPOINTMENT (OUTPATIENT)
Dept: NEUROLOGY | Facility: CLINIC | Age: 35
End: 2020-02-06

## 2020-03-26 ENCOUNTER — APPOINTMENT (OUTPATIENT)
Dept: ANTEPARTUM | Facility: CLINIC | Age: 35
End: 2020-03-26
Payer: MEDICAID

## 2020-03-26 ENCOUNTER — APPOINTMENT (OUTPATIENT)
Dept: HEART AND VASCULAR | Facility: CLINIC | Age: 35
End: 2020-03-26
Payer: MEDICAID

## 2020-03-26 ENCOUNTER — NON-APPOINTMENT (OUTPATIENT)
Age: 35
End: 2020-03-26

## 2020-03-26 VITALS
DIASTOLIC BLOOD PRESSURE: 82 MMHG | HEART RATE: 77 BPM | BODY MASS INDEX: 24.4 KG/M2 | WEIGHT: 161 LBS | HEIGHT: 68 IN | SYSTOLIC BLOOD PRESSURE: 148 MMHG

## 2020-03-26 PROCEDURE — 76817 TRANSVAGINAL US OBSTETRIC: CPT

## 2020-03-26 PROCEDURE — 99215 OFFICE O/P EST HI 40 MIN: CPT

## 2020-03-26 PROCEDURE — 93000 ELECTROCARDIOGRAM COMPLETE: CPT

## 2020-03-26 PROCEDURE — 76816 OB US FOLLOW-UP PER FETUS: CPT

## 2020-03-27 ENCOUNTER — APPOINTMENT (OUTPATIENT)
Dept: NEUROLOGY | Facility: CLINIC | Age: 35
End: 2020-03-27
Payer: MEDICAID

## 2020-03-27 DIAGNOSIS — G45.9 TRANSIENT CEREBRAL ISCHEMIC ATTACK, UNSPECIFIED: ICD-10-CM

## 2020-03-27 DIAGNOSIS — G40.909 EPILEPSY, UNSPECIFIED, NOT INTRACTABLE, W/OUT STATUS EPILEPTICUS: ICD-10-CM

## 2020-03-27 DIAGNOSIS — D68.59 OTHER PRIMARY THROMBOPHILIA: ICD-10-CM

## 2020-03-27 PROCEDURE — 99213 OFFICE O/P EST LOW 20 MIN: CPT | Mod: 95

## 2020-03-27 NOTE — REVIEW OF SYSTEMS
[As Noted in HPI] : as noted in HPI [Negative] : Heme/Lymph [FreeTextEntry2] : General fatigue without specific weakness or numbness.

## 2020-03-27 NOTE — DATA REVIEWED
[de-identified] : Labs:\par 1/15/2019 - Oxacarbazepine 34 (10-35)\par \par 2/21/2019 - \par CBC, CMP - WNL\par Lipid profile: LDL 75, HDL 46, Triglycerides 73, Total 137\par TSH 1.59\par Vitamin B12 376\par Hemoglobin A1C 5.2%

## 2020-03-27 NOTE — REVIEW OF SYSTEMS
[Feeling Fatigued] : feeling fatigued [Chest Pain] : chest pain [Dizziness] : dizziness [Numbness (Hypesthesia)] : numbness [Convulsions] : convulsions [Negative] : Heme/Lymph [Shortness Of Breath] : no shortness of breath [Dyspnea on exertion] : not dyspnea during exertion [Palpitations] : no palpitations

## 2020-03-27 NOTE — HISTORY OF PRESENT ILLNESS
[FreeTextEntry1] : Interval history: \par Verbal consent given on  03/27/2020  and by Federico Mesa\par Start time: 1:02pm\par End time : 1:21pm \par \par Declines formal - using family member.\par \par Patient recently had a headache that radiated to her left eye; it lasted about 20 minutes. She said she couldn't speak and some of her body parts fell asleep: her left face, left arm, not her leg. Her speech changed; she couldn't get the words out. This occurred on March 14th- 3rd, 4th day had the same symptoms again and lasted about 40 mins to an hour. Patient has been adherent to Brilinta BID- even during the episodes and never missed a dose. 16th of March was her last headache. \par \par \par \par Dated 4/2019:\par HPI : 33 year-old female presents for follow up of seizures and Perez-Perez disease.  \par \par 1) Seizures - She had 2 seizures since last visit (1 in March and 1 in April) that presented with aura consisting of generalized fatigue and gum cramping.  She had bilateral muscle cramping and shaking for less than 15 minutes.  Some shortness of breath.  These were relatively similar to her previous seizures.  She's compliant with Trileptal.  \par \par 2) Perez-Perez disease - No new weakness.  Has numbness in her left thumb compared to other side\par - She had cerebral angiogram in March to evaluate for surgery on second side.  It confirmed good blood flow through her bypass on the left.  She has since followed up with Rachel Morris and Ethan.  The next surgery is planned once she returns from [place] in about 2 months.\par - Compliant with Brilinta and Atorvastatin.  No bleeding in urine or stool.

## 2020-03-27 NOTE — PHYSICAL EXAM
[Well Developed] : well developed [Well Nourished] : well nourished [Normal Voice Quality] : was normal [Normal Verbal Skills] : the patient had normal verbal communication skills [Normal Conjunctiva] : the conjunctiva exhibited no abnormalities [Eyelids - No Xanthelasma] : the eyelids demonstrated no xanthelasmas [Normal Jugular Venous A Waves Present] : normal jugular venous A waves present [Normal Jugular Venous V Waves Present] : normal jugular venous V waves present [No Jugular Venous Reed A Waves] : no jugular venous reed A waves [Respiration, Rhythm And Depth] : normal respiratory rhythm and effort [Exaggerated Use Of Accessory Muscles For Inspiration] : no accessory muscle use [Auscultation Breath Sounds / Voice Sounds] : lungs were clear to auscultation bilaterally [Heart Rate And Rhythm] : heart rate and rhythm were normal [Heart Sounds] : normal S1 and S2 [Murmurs] : no murmurs present [Abdomen Soft] : soft [Abdomen Tenderness] : non-tender [Abdomen Mass (___ Cm)] : no abdominal mass palpated [Abnormal Walk] : normal gait [Gait - Sufficient For Exercise Testing] : the gait was sufficient for exercise testing [Nail Clubbing] : no clubbing of the fingernails [Cyanosis, Localized] : no localized cyanosis [Petechial Hemorrhages (___cm)] : no petechial hemorrhages [Skin Color & Pigmentation] : normal skin color and pigmentation [] : no rash [No Venous Stasis] : no venous stasis [Skin Lesions] : no skin lesions [No Skin Ulcers] : no skin ulcer [No Xanthoma] : no  xanthoma was observed [Oriented To Time, Place, And Person] : oriented to person, place, and time [Mood] : the mood was normal [FreeTextEntry1] : poor understanding of her overall health; neurology- CN II-XII grossly intact, however pupillary response was not assessed, normal Rhomberg, 5/5 strength b/l UE & LE, possible hyperreflexia on left UE compared to right, vs. diminished on right

## 2020-03-27 NOTE — DISCUSSION/SUMMARY
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 Ms. Mesa is currently pregnant and not clear how far in her pregnancy she is. \par \par Patient found to be 14 weeks and three days pregnant and also describing symptoms of TIA vs. CVA and possible unstable thrombus. \par She will be evaluated by Dr. Nahum Barney and will get imaging of brain. \par Will give more specific recommendations on anesthesia and thrombotic agents with the hep of Dr. Umm Viera. \par Also recommend that her blood pressures be maintained greater than 120 as feasible for the maintenance of cerebral perfusion. \par From a cardiac standpoint, she has a preserved EF on prior imaging and no reason to suspect any issues with handling volume as needed per-procedure. Will not repeat echo for concern that echo tech could expose her to COVID inadvertently and she had a preserved EF in , \par Aiming for planned OR D&C next Friday after input from multiple specialties.

## 2020-03-27 NOTE — ASSESSMENT
[FreeTextEntry1] : 34 year-old female w/pmh of seizures and Perez-Perez disease presents for follow up of recurrent onset of headaches/TIA. Neurological exam without deficits. Patient is being worked up for clearance for an  for her 14 week pregnancy due to being very high risk. In addition, will retest hypercoagulable labs due to her history of miscarriages and failed bypass on Plavix in the past. Additional imaging needed to ensure to rule out stroke or worsening Perez-Perez which will change her risk during termination of pregnancy. \par \par Plan for headaches/TIA:\par -MRI w/o contrast \par -MRA head w/o contrast\par -Labs- hypercoag including accumetrics\par \par Plan for seizures - \par 1) Continue current Trileptal dose, 900mg BID, since her Trileptal level was close to high level of normal.  She should also take Folic acid 1mg for neural protection.\par 2) Continue to follow up with Dr. Walter\par \par Plan for Perez-Perez disease - \par 1) Continue Brilinta 60mg BID for antiplatelet and Atorvastatin 80mg for statin\par 2) Blood pressure - keep monitoring w/ Cardiologist.\par

## 2020-03-27 NOTE — HISTORY OF PRESENT ILLNESS
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 Ms. Mesa is currently pregnant and not clear how far in her pregnancy she is. \par \par Chest pain on exertion and at rest. Chest pain on walking 4 blocks and also at night. Pain improved with nitroglycerin. The pain is the same at rest and on exertion and worse at night. She reports that it is unchanged for several years and definitely not increased in the prior few months. The pain is also unchanged from the time of her stress test that we had done about one year ago when she was able to walk for 12:30 minutes on modified Jordan protocol.\par \par On the flight back from the , she had headache in her left frontal area and then numbness in her left shoulder area lasting for 20 minutes. She noted that she also had numbness in her mouth and had a hard time speaking briefly (slurred speech) and then was fine. She reports that her eye felt almost "wrinkled" which she describes as weakness in the left eyelid and all of these symptoms improved in 20 minutes. Slurred speech lasted for about only 40 minutes after the flight landed. \par \par One week ago she had a left sided headache with slurred speech for which the symptoms lasted 40 minutes. None of the other neurologic symptoms noted. \par \par She reports that she has had headaches in the past, but these are different than her prior in that the location is in back of the left eye and she now had what sounds like drooping of the left eyelid with it.  In asking about her prior headaches, she reports that she had similar symptoms,but now these are more severe. She also never had the feeling of something moving in her head. This was also more intense. \par \par She has only been in [place] for 2 weeks and had been in  prior to that for months. No recent seizures- last one was February. She had 4 in the prior months, but the seizures have actually become less frequent in the month. \par \par When she was in [facility], she had a few weeks of diarrhea back in November, but nothing since that. \par \par She specifically denies fever, cough, dyspnea or contacts known to have COVID.\par \par In the setting of her prior bypass graft failure, she had difficulty speaking but that resolved. This was while she was in the hospital and lasted very briefly. \par \par Cardiac cath history:\par - 2014 --> s/p PTCA/TOMASZ to LAD\par - 2015 --> ISR of pLAD s/p TOMASZ with resolute stent\par - 2016 --> ISR of pLAD s/p PTCA/TOMASZ, as well as PTCA/TOMASZ to D1\par - 2016 --> PTCA of Cx. On this angiogram patient noted to have patent LM, pLAD, and pD1\par \par 3/2019- Modified Jordan stress test- 12:30 minutes- atypical left should pain, unchanged with exercise.\par \par From Prevention Fellow- Taniya Gupta 3/20/2020\par Ms Mesa called out office yesterday and I spoke with her. She wanted to inform us that she is pregnant. Says she missed her period x2 and did a home pregnancy test which was positive. She thinks she is about 9-10 weeks pregnant, however, she has not seen a doctor to better assess, and denies having an appointment with her OB, however, mentions being interested in terminating the pregnancy. On further questioning she mentions her OB being Dr. Geovanna Leal at [facility] and I encouraged her to make an appointment with her as soon as possible to start the process and that in the meantime, I will let Dr. Rose know her situation and we will stay in touch with her to coordinate her care.\par

## 2020-03-27 NOTE — PHYSICAL EXAM
[FreeTextEntry1] : Physical exam:\par General - sitting in exam chair, NAD\par Eyes: anicteric sclera\par \par Neurological exam: \par Mental Status -  AAOx3, speech fluent, no dysarthria. Naming and repetition intact. Follows 3 step commands, remote and recent memory intact. Fund of knowledge full.\par Cranial Nerves - EOMI, VFF, face symmetric, V1-V3 intact, tongue midline\par Motor - No pronator drift, 5/5 x 4, normal bulk and tone\par Sensory - light touch feels the same on both arms and feet/legs\par Coordination- finger-nose intact bilaterally, no tremor or dysmetria\par Gait- steady- could walk on her tip toes and heels.

## 2020-03-30 ENCOUNTER — LABORATORY RESULT (OUTPATIENT)
Age: 35
End: 2020-03-30

## 2020-08-02 LAB
ALBUMIN SERPL ELPH-MCNC: 3.9 G/DL
ALP BLD-CCNC: 67 U/L
ALT SERPL-CCNC: 10 U/L
ANA SER IF-ACNC: NEGATIVE
ANION GAP SERPL CALC-SCNC: 13 MMOL/L
APTT IMM NP/PRE NP PPP: NORMAL
APTT INV RATIO PPP: 31.8 SEC
AST SERPL-CCNC: 12 U/L
AT III PPP CHRO-ACNC: 104 %
B2 GLYCOPROT1 AB SER QL: NEGATIVE
B2 GLYCOPROT1 IGA SERPL IA-ACNC: <5 SAU
B2 GLYCOPROT1 IGG SER-ACNC: <5 SGU
B2 GLYCOPROT1 IGM SER-ACNC: <5 SMU
BASOPHILS # BLD AUTO: 0.03 K/UL
BASOPHILS NFR BLD AUTO: 0.5 %
BILIRUB SERPL-MCNC: 0.2 MG/DL
BUN SERPL-MCNC: 6 MG/DL
CALCIUM SERPL-MCNC: 9.1 MG/DL
CARDIOLIPIN AB SER IA-ACNC: NEGATIVE
CARDIOLIPIN IGM SER-MCNC: 11.7 MPL
CARDIOLIPIN IGM SER-MCNC: 5.4 GPL
CHLORIDE SERPL-SCNC: 96 MMOL/L
CO2 SERPL-SCNC: 23 MMOL/L
CONFIRM: 27 SEC
CREAT SERPL-MCNC: 0.58 MG/DL
CRP SERPL HS-MCNC: 3.73 MG/L
DEPRECATED CARDIOLIPIN IGA SER: <5 APL
DNA PLOIDY SPEC FC-IMP: NORMAL
DRVVT IMM 1:2 NP PPP: NORMAL
DRVVT SCREEN TO CONFIRM RATIO: 1.03 RATIO
EOSINOPHIL # BLD AUTO: 0.03 K/UL
EOSINOPHIL NFR BLD AUTO: 0.5 %
ERYTHROCYTE [SEDIMENTATION RATE] IN BLOOD BY WESTERGREN METHOD: 20 MM/HR
FIBRINOGEN PPP COAG.DERIVED-MCNC: 516 MG/DL
FOLATE SERPL-MCNC: 11.9 NG/ML
GLUCOSE SERPL-MCNC: 77 MG/DL
HCT VFR BLD CALC: 41.3 %
HCYS SERPL-MCNC: 5.2 UMOL/L
HGB BLD-MCNC: 13 G/DL
HOMOCYSTEINE LEVEL: 8.3 UMOL/L
IMM GRANULOCYTES NFR BLD AUTO: 1.3 %
INR PPP: 0.95 RATIO
LUPUS ANTICOAGULANT CASCADE REFLEX: NORMAL
LYMPHOCYTES # BLD AUTO: 1.59 K/UL
LYMPHOCYTES NFR BLD AUTO: 25 %
MAN DIFF?: NORMAL
MCHC RBC-ENTMCNC: 28.4 PG
MCHC RBC-ENTMCNC: 31.5 GM/DL
MCV RBC AUTO: 90.2 FL
METHYLMALONIC ACID LEVEL: 96 NMOL/L
MONOCYTES # BLD AUTO: 0.49 K/UL
MONOCYTES NFR BLD AUTO: 7.7 %
NEUTROPHILS # BLD AUTO: 4.15 K/UL
NEUTROPHILS NFR BLD AUTO: 65 %
NPP NORMAL POOLED PLASMA: NORMAL SECS
PA ADP PRP-ACNC: 6 PRU
PLATELET # BLD AUTO: 233 K/UL
PLATELET RESPONSE ASPIRIN: 651 ARU
POTASSIUM SERPL-SCNC: 4.3 MMOL/L
PROT SERPL-MCNC: 6.3 G/DL
PT BLD: 10.7 SEC
PTR INTERP: NORMAL
RBC # BLD: 4.58 M/UL
RBC # FLD: 12.9 %
SCREEN DRVVT: 30.7 SEC
SODIUM SERPL-SCNC: 131 MMOL/L
VIT B12 SERPL-MCNC: 435 PG/ML
WBC # FLD AUTO: 6.37 K/UL

## 2020-10-01 ENCOUNTER — APPOINTMENT (OUTPATIENT)
Dept: NEUROLOGY | Facility: CLINIC | Age: 35
End: 2020-10-01
Payer: MEDICAID

## 2020-10-01 VITALS
WEIGHT: 169 LBS | HEIGHT: 68 IN | DIASTOLIC BLOOD PRESSURE: 78 MMHG | BODY MASS INDEX: 25.61 KG/M2 | SYSTOLIC BLOOD PRESSURE: 146 MMHG | HEART RATE: 79 BPM | TEMPERATURE: 98.4 F | OXYGEN SATURATION: 97 %

## 2020-10-01 DIAGNOSIS — Z00.00 ENCOUNTER FOR GENERAL ADULT MEDICAL EXAMINATION W/OUT ABNORMAL FINDINGS: ICD-10-CM

## 2020-10-01 PROCEDURE — 99214 OFFICE O/P EST MOD 30 MIN: CPT

## 2020-10-02 NOTE — PHYSICAL EXAM
[FreeTextEntry1] : MS- AAOx3, speech fluent, no dysarthria. Naming and repetition intact. Follows 3 step commands, remote and recent memory intact. Fund of knowledge full. \par CN- PERRLA, EOMI, VFFC, face symmetric, Dec L V1, tongue midline, palate symmetric, shoulder shrug symmetric, hearing grossly intact. \par Fund- no papilledema\par Motor- 5/5 x 4\par Sensory-LT/Vib/PP/proprioception intact throughout except( dec PP RUE and LLE)\par Reflexes- 2+ throughout, plantar withdrawal b/l\par Coordination- FTN intact, no tremor or dysmetria\par Gait- steady normal gait, no ataxia\par romberg negative

## 2020-10-02 NOTE — DATA REVIEWED
[de-identified] : MRA 8/28/17- attenuated caliber of L STA w/ no signal identified in distal bypass segment which may be secondary to occlusion.\par MRI brain 8/28/17-s/p L FT craniotomy for STA bypass with post surgical changes. Stable watershed ischemia w/in centrum semiovale b/l. No acute ischemia. [de-identified] : VEEG 3/2016 at [facility]: bitemporal slowing with left temporal sharp wave possibly epileptiform. no seizure\par \par \par EXAM:  VID EEG MON SZ FIRST 24 HR                      \par  \par PROCEDURE DATE:  10/23/2016  \par     \par     \par \par \par \par INTERPRETATION:  \par \par \par [facility] Department of Neurology\par Epilepsy Monitoring Unit Neurophysiology Report\par \par Patient Name:  STEPHANE NICOLE  \par :  1985\par MRN:  [id number]\par \par Study Start Date/Time:  10/21/2016, 2:33:54 PM\par Study End Date/Time:  10/23/2016, 10:45 AM\par \par Referred by: -Dr. Lesly Walter\par \par Brief Clinical History:  STEPHANE NICOLE is a 30 yo Female with history \par of Myocardial infaction and multiple cardiac stents, ?encephalomalacic \par changes of the left frontal parietal and to a lesser extent temporal \par lobes. There are several foci of T2 and FLAIR signal hyperintensities in \par the bilateral centrum semiovale, left greater than right; findings are in \par a watershed distribution. There is a small left frontal infarct. There is \par a small right basal ganglia chronic lacunar infarction? and seizure \par disorder.  Requires removal of broad enzyme inducers, namely \par carbamazepine, for oxcarbazepine due to less pharmacokinetic interaction \par with Brillinta, per cardiologist.  Admitted to monitor background at \par onset of changes to determine risks in cross-over.\par \par Diagnosis Code: -G40.219\par \par Medications on admission\par Carbamazepine 200mg bid, oxcarbazepine 150mg bid\par \par Acquisition Details:\par Electroencephalography was acquired using a minimum of 21 channels on an \par [employer] system v 8.1.1 with electrode placement according \par to the standard International 10-20 system following ACNS (American \par Clinical Neurophysiology Society) guidelines for Long Term Video EEG \par monitoring.  Anterior temporal T1 and T2 electrodes were utilized \par whenever possible.   The [employer] automated spike & seizure detections were \par all reviewed in detail, in addition to extensive portions of raw EEG.\par \par Day 1: 10/21/2016 @ 2:33:54 PM\par Background:  predominantly alpha and beta frequencies.\par Organization: normal anterior-posterior voltage-frequency gradient\par Posterior Dominant Rhythm: symmetric, well-regulated 10 Hz.\par N2 sleep: symmetric and synchronous sleep spindles and K-complexes \par present.\par Focal abnormalities:\par 1)  left 3.5Hz Temporal Intermittent Rhythmic Delta Activity (TIRDA), \par occasional during wakefulness and frequent during early sleep. \par Spontaneous Activity:\par 1)  No epileptiform discharges present, aside from TIRDA above.\par \par Events:\par 1)  No electrographic seizures occurred during this day\par 2)  No significant clinical events occurred during this day.\par Provocations:\par 1)  HV/Photic: not performed.\par \par Daily Updates (from 6am until 6am the following day):\par Day 2 Oct 22-: Background unchanged.  There was frequent TIRDA upon \par awakening, up to 4s in duration, and left > right polymorphic delta \par lasting <2s.  Technical issues obscured much of the EEG tracing after \par 10pm.\par \par Day 3 Oct 23: Tracing illegible from start 6am, until 10am.  Background \par unchanged.  There was frequent TIRDA upon awakening, up to 4s in \par duration, and left > right polymorphic delta lasting <2s.\par Provocations: Photic stimulation performed at 10;38, and did NOT elicit \par abnormalities on EEG.\par \par Summary:\par This was an abnormal video-EEG study due to:\par 1)  Left TIRDA, which in patients with mesial temporal abnormalities has \par been considered to be epileptiform in nature.\par 2)  Left > Right polymorphic delta activity\par \par \par Clinical Correlation:\par There was evidence of left worse than right mild cerebral dysfunction.  \par In patients with known temporal lobe seizures, the left temporal \par intermittent delta activity would lateralize the epileptogenic focus to \par the left.\par \par \par Ncio Cadet MD MSc\par Director, Epilepsy and EEG\par \par - REEG- mild cerebral dysfunction with high epileptic potential in the L temporal region\par - -AEEG- mild cerebral dysfunction with highly epileptic potential arising from the left temporal region. In addition GRDA is a nonspecific marker of encephalopathy. \par \par AEEG -2020: epileptogenic focus L temporal area (occasional L temp spikes, Continuous L temp slowing)\par REEG 10/31/19: epileptogenic focus L temporal area \par \par  [de-identified] : EXAM:  CT HEAD WO CONTRAST\par \par \par PROCEDURE DATE:  03/16/2016\par \par \par \par \par INTERPRETATION:  PROCEDURE: CT head without intravenous contrast\par \par INDICATION: Abnormal EEG/seizure\par \par TECHNIQUE: Multiple axial images were obtained at 5 mm intervals from the\par skull base to the vertex. The images were reviewed in brain and bone windows.\par \par COMPARISON: None\par \par FINDINGS: The CT examination demonstrates asymmetry in the cerebral\par hemispheres with the left being smaller with dilatation of frontal and\par parietal sulci as well as the sylvian fissure. There also may be reduced\par volume of left sphenoid white matter. The left frontal sinus is mildly\par asymmetrically larger. These findings may be secondary to cerebral\par hemiatrophy. There is no midline shift or extra axial collections. The gray\par white differentiation appears within normal limits. There is no intracranial\par hemorrhage or acute transcortical infarct. The bony windows demonstrates no\par fractures. The visualized paranasal sinuses are within normal limits. The\par mastoid air cells are well aerated.\par \par IMPRESSION: Findings suggestive of left cerebral hemiatrophy. These findings\par may be further evaluated with a MRI.\par \par \par \par \par "Thank you for the opportunity to participate in the care of this patient."\par \par \par \par JOSE ROBERTO GO M.D., ATTENDING RADIOLOGIST\par \par  \par 4/25/16- Carbamazepine level -10.5 H (taken 4 hr after took AM dose)\par \par 4/12/2018- OXC- undetectable\par \par 5/22/18  OXC level- 18 (therapeutic)\par \par 6/13/18 OXC- 22\par \par 7/2018 OXC level- 25\par \par 8/22/2018- OXC level- 9L\par \par OXC level: 9/5/18- 29 (therapeutic)\par \par 1/2019 OXC- 34\par \par OXC level- 11/5/2019: 32

## 2020-10-02 NOTE — HISTORY OF PRESENT ILLNESS
[Left Handed] : left handed [Stress] : stress [Postictal Confusion and Lethargy] : postictal confusion and lethargy [___ per month] : [unfilled] times per month [Lesional] : lesional [Grand Mal Status Epilepticus] : no [] : no [Family History of Seizures] : no family history of seizures [ Complications] : ~T no  complications [Head Trauma] : no head trauma [Febrile Seizures] : no febrile seizures [Meningitis or Encephalitis] : no meningitis or encephalitis [Developmental Delay] : no developmental delay [Stroke] : no stroke  [FreeTextEntry1] : 9 yrs of age [FreeTextEntry2] : 1) Convulsive [FreeTextEntry3] : Begins with a headache, becomes stiff/rigid, + drooling. no incontinence or tongue biting. No LOC.  Events last 5 minutes roughly. + Post-ictal tiredness no confusion\par \par *There was only 1 seizure in lifetime during which she lost consciousness.  [FreeTextEntry4] : Triggered by stress.  [FreeTextEntry7] : Prior to hospitalization where Tegretol was increased, had 2 seizures 1 week apart 1 month ago in [place] after self-lowering her Tegretol 3 months prior. Pior to that had 1 seizure per month.\par \par (for a 10 yr period when [age] was off AEDs and seizure free) Seizures recurred after receiving her first cardiac stent.\par \par She has not had sz since leaving hospital 3 weeks ago [de-identified] : 2/2016 [de-identified] : Tegretol and Tegretol XR- tiredness\par Keppra- made seizures worse

## 2020-10-02 NOTE — DISCUSSION/SUMMARY
[FreeTextEntry1] : 29 y/o LHF with hx of significant cardiovascular disease s/p stents, epilepsy since age of 9 initially on CBZ-unclear classification although possibly partial epilepsy due to inpatient EEG showing bitemporal slowing with left temporal sharp wave possibly epileptiform.\par \par As per Cardiology, CBZ can have drug interactions with Brillinta (antiplatelet needed for multiple stents) and thus recommended  transition to OXC. OXC also has a mildly safer pregnancy/teratogenicity profile (2.5% risk as compared to CBZ at 3%).  Discussed with patient and she was agreeable to transitioning to this medication in a monitored EMU setting due to risk of seizure exacerbation. Patient was successfully cross titrated in October 2016. \par \par In 4/2018 patient returned to clinic after discovering she was pregnant. She was started on Folic acid 4mg and continued on OXC 600mg BID with therapeutic levels until the week of spontaneous vaginal delivery on 8/27 at 24 weeks and 1 day (she had been on hormone treatment with a cervical device for cervical incompetency). Her daughter is doing well at 14 months of age, starting to stand on her own but not walking yet. Patient had 1 seizure post delivery due to subtherapeutic level, OXC increased to 900mg BID. In last few months seizures have exacerbated for unclear reasons. Possibly triggered by lack of sleep with young toddler. Also with RUE diminished sensation of unclear duration. \par \par \par 1) Cont OXC  900mg BID \par 2) Continue Folic Acid 1 mg daily\par 3) Cardiology f/u with Dr. Rose\par 4) Stroke f/u with Dr. Barney- Advised to go to nearest if ER if any focal neuro deficits as this could be a sign of stroke \par 5) F/u with NSx/Dr. Morris for angiogram\par 6) Check OXC level, consider inc dose after check level. May need AEEG as well\par \par \par F/u 2 months\par \par No driving advised in accordance with [place] law. Plans to fly to [place] in [place], will need better seizure control by then\par

## 2020-10-08 ENCOUNTER — APPOINTMENT (OUTPATIENT)
Dept: HEART AND VASCULAR | Facility: CLINIC | Age: 35
End: 2020-10-08
Payer: MEDICAID

## 2020-10-08 ENCOUNTER — NON-APPOINTMENT (OUTPATIENT)
Age: 35
End: 2020-10-08

## 2020-10-08 VITALS
BODY MASS INDEX: 25.46 KG/M2 | HEIGHT: 68 IN | HEART RATE: 72 BPM | WEIGHT: 168 LBS | SYSTOLIC BLOOD PRESSURE: 150 MMHG | DIASTOLIC BLOOD PRESSURE: 118 MMHG | TEMPERATURE: 98 F

## 2020-10-08 DIAGNOSIS — I25.10 ATHEROSCLEROTIC HEART DISEASE OF NATIVE CORONARY ARTERY W/OUT ANGINA PECTORIS: ICD-10-CM

## 2020-10-08 LAB
ANION GAP SERPL CALC-SCNC: 10 MMOL/L
BUN SERPL-MCNC: 9 MG/DL
CALCIUM SERPL-MCNC: 9.4 MG/DL
CHLORIDE SERPL-SCNC: 104 MMOL/L
CO2 SERPL-SCNC: 26 MMOL/L
CREAT SERPL-MCNC: 0.81 MG/DL
GLUCOSE SERPL-MCNC: 82 MG/DL
OXCARBAZEPINE SERPL-MCNC: 13 UG/ML
POTASSIUM SERPL-SCNC: 4.7 MMOL/L
SODIUM SERPL-SCNC: 140 MMOL/L

## 2020-10-08 PROCEDURE — 99215 OFFICE O/P EST HI 40 MIN: CPT

## 2020-10-08 PROCEDURE — 93000 ELECTROCARDIOGRAM COMPLETE: CPT

## 2020-10-08 NOTE — PHYSICAL EXAM
[General Appearance - Well Developed] : well developed [Normal Appearance] : normal appearance [Well Groomed] : well groomed [General Appearance - Well Nourished] : well nourished [No Deformities] : no deformities [General Appearance - In No Acute Distress] : no acute distress [Normal Jugular Venous A Waves Present] : normal jugular venous A waves present [Normal Jugular Venous V Waves Present] : normal jugular venous V waves present [No Jugular Venous Reed A Waves] : no jugular venous reed A waves [Respiration, Rhythm And Depth] : normal respiratory rhythm and effort [Exaggerated Use Of Accessory Muscles For Inspiration] : no accessory muscle use [Auscultation Breath Sounds / Voice Sounds] : lungs were clear to auscultation bilaterally [Heart Rate And Rhythm] : heart rate and rhythm were normal [Heart Sounds] : normal S1 and S2 [Murmurs] : no murmurs present [Abdomen Soft] : soft [Abdomen Tenderness] : non-tender [Abdomen Mass (___ Cm)] : no abdominal mass palpated [Nail Clubbing] : no clubbing of the fingernails [Cyanosis, Localized] : no localized cyanosis [Petechial Hemorrhages (___cm)] : no petechial hemorrhages [Skin Color & Pigmentation] : normal skin color and pigmentation [] : no rash [No Venous Stasis] : no venous stasis [Skin Lesions] : no skin lesions [No Skin Ulcers] : no skin ulcer [No Xanthoma] : no  xanthoma was observed [Oriented To Time, Place, And Person] : oriented to person, place, and time [Affect] : the affect was normal [Mood] : the mood was normal [No Anxiety] : not feeling anxious

## 2020-10-08 NOTE — HISTORY OF PRESENT ILLNESS
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 \par \par She needs to take up to 3-4 pills of SL NTG almost every day. She reports this is for 3 months. She is walking intermittently to help her lose weight. She gets the chest pain when she walks. \par She sometimes gets a headache. She does get some dizziness, but not continuous. She gets dizzy when she gets chest pain. \par She has an IUD and reports that she has been spotting ever since she had D&C but not really getting full periods. \par She denies any orthopnea or PND. \par No fever, chills, cough. \par She had last seizure in May when she was in [facility]. She also had one month ago. \par \par Prior History\par On the flight back from the , she had headache in her left frontal area and then numbness in her left shoulder area lasting for 20 minutes. She noted that she also had numbness in her mouth and had a hard time speaking briefly (slurred speech) and then was fine. She reports that her eye felt almost "wrinkled" which she describes as weakness in the left eyelid and all of these symptoms improved in 20 minutes. Slurred speech lasted for about only 40 minutes after the flight landed. \par \par One week ago she had a left sided headache with slurred speech for which the symptoms lasted 40 minutes. None of the other neurologic symptoms noted. \par \par She reports that she has had headaches in the past, but these are different than her prior in that the location is in back of the left eye and she now had what sounds like drooping of the left eyelid with it.  In asking about her prior headaches, she reports that she had similar symptoms,but now these are more severe. She also never had the feeling of something moving in her head. This was also more intense. \par \par She has only been in [place] for 2 weeks and had been in  prior to that for months. No recent seizures- last one was February. She had 4 in the prior months, but the seizures have actually become less frequent in the month. \par \par When she was in [facility], she had a few weeks of diarrhea back in November, but nothing since that. \par \par She specifically denies fever, cough, dyspnea or contacts known to have COVID.\par \par In the setting of her prior bypass graft failure, she had difficulty speaking but that resolved. This was while she was in the hospital and lasted very briefly. \par \par Cardiac cath history:\par - 2014 --> s/p PTCA/TOMASZ to LAD\par - 2015 --> ISR of pLAD s/p TOMASZ with resolute stent\par - 2016 --> ISR of pLAD s/p PTCA/TOMASZ, as well as PTCA/TOMASZ to D1\par - 2016 --> PTCA of Cx. On this angiogram patient noted to have patent LM, pLAD, and pD1\par \par 3/2019- Modified Jordan stress test- 12:30 minutes- atypical left should pain, unchanged with exercise.\par \par From Prevention Fellow- Taniya Gupta 3/20/2020\par Ms Mesa called out office yesterday and I spoke with her. She wanted to inform us that she is pregnant. Says she missed her period x2 and did a home pregnancy test which was positive. She thinks she is about 9-10 weeks pregnant, however, she has not seen a doctor to better assess, and denies having an appointment with her OB, however, mentions being interested in terminating the pregnancy. On further questioning she mentions her OB being Dr[facility]al at [facility] and I encouraged her to make an appointment with her as soon as possible to start the process and that in the meantime, I will let Dr. Rose know her situation and we will stay in touch with her to coordinate her care.\par

## 2020-10-08 NOTE — DISCUSSION/SUMMARY
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 \par \par Toprol increased in the setting of her having elevated BP and in the setting of angina. She will have CTA to assess disease and will be conservative if feasible. \par Will have her connect with an internist since she was not able to see the last one she connected with. \par

## 2020-10-08 NOTE — REVIEW OF SYSTEMS
[Headache] : headache [Dyspnea on exertion] : dyspnea during exertion [Chest Pain] : chest pain [Dizziness] : dizziness [Negative] : Heme/Lymph

## 2020-10-28 ENCOUNTER — RX RENEWAL (OUTPATIENT)
Age: 35
End: 2020-10-28

## 2020-11-11 ENCOUNTER — RESULT REVIEW (OUTPATIENT)
Age: 35
End: 2020-11-11

## 2020-11-11 ENCOUNTER — APPOINTMENT (OUTPATIENT)
Dept: CT IMAGING | Facility: HOSPITAL | Age: 35
End: 2020-11-11
Payer: MEDICAID

## 2020-11-11 ENCOUNTER — OUTPATIENT (OUTPATIENT)
Dept: OUTPATIENT SERVICES | Facility: HOSPITAL | Age: 35
LOS: 1 days | End: 2020-11-11
Payer: MEDICAID

## 2020-11-11 DIAGNOSIS — T82.897A OTHER SPECIFIED COMPLICATION OF CARDIAC PROSTHETIC DEVICES, IMPLANTS AND GRAFTS, INITIAL ENCOUNTER: Chronic | ICD-10-CM

## 2020-11-11 PROCEDURE — 75574 CT ANGIO HRT W/3D IMAGE: CPT

## 2020-11-11 PROCEDURE — 75574 CT ANGIO HRT W/3D IMAGE: CPT | Mod: 26

## 2020-11-13 ENCOUNTER — INPATIENT (INPATIENT)
Facility: HOSPITAL | Age: 35
LOS: 5 days | Discharge: LEFT BEFORE DISCHARGE | DRG: 287 | End: 2020-11-19
Attending: PSYCHIATRY & NEUROLOGY | Admitting: PSYCHIATRY & NEUROLOGY
Payer: MEDICAID

## 2020-11-13 VITALS
OXYGEN SATURATION: 99 % | SYSTOLIC BLOOD PRESSURE: 165 MMHG | HEIGHT: 68 IN | HEART RATE: 84 BPM | WEIGHT: 160.06 LBS | DIASTOLIC BLOOD PRESSURE: 100 MMHG | RESPIRATION RATE: 18 BRPM | TEMPERATURE: 98 F

## 2020-11-13 DIAGNOSIS — G40.909 EPILEPSY, UNSPECIFIED, NOT INTRACTABLE, WITHOUT STATUS EPILEPTICUS: ICD-10-CM

## 2020-11-13 DIAGNOSIS — G45.9 TRANSIENT CEREBRAL ISCHEMIC ATTACK, UNSPECIFIED: ICD-10-CM

## 2020-11-13 DIAGNOSIS — E78.5 HYPERLIPIDEMIA, UNSPECIFIED: ICD-10-CM

## 2020-11-13 DIAGNOSIS — I10 ESSENTIAL (PRIMARY) HYPERTENSION: ICD-10-CM

## 2020-11-13 DIAGNOSIS — I25.10 ATHEROSCLEROTIC HEART DISEASE OF NATIVE CORONARY ARTERY WITHOUT ANGINA PECTORIS: ICD-10-CM

## 2020-11-13 DIAGNOSIS — Z29.9 ENCOUNTER FOR PROPHYLACTIC MEASURES, UNSPECIFIED: ICD-10-CM

## 2020-11-13 DIAGNOSIS — I20.0 UNSTABLE ANGINA: ICD-10-CM

## 2020-11-13 DIAGNOSIS — T82.897A OTHER SPECIFIED COMPLICATION OF CARDIAC PROSTHETIC DEVICES, IMPLANTS AND GRAFTS, INITIAL ENCOUNTER: Chronic | ICD-10-CM

## 2020-11-13 LAB
ALBUMIN SERPL ELPH-MCNC: 3.7 G/DL — SIGNIFICANT CHANGE UP (ref 3.3–5)
ALBUMIN SERPL ELPH-MCNC: 4 G/DL — SIGNIFICANT CHANGE UP (ref 3.3–5)
ALP SERPL-CCNC: 43 U/L — SIGNIFICANT CHANGE UP (ref 40–120)
ALP SERPL-CCNC: 44 U/L — SIGNIFICANT CHANGE UP (ref 40–120)
ALT FLD-CCNC: SIGNIFICANT CHANGE UP (ref 10–45)
ALT FLD-CCNC: SIGNIFICANT CHANGE UP (ref 10–45)
ANION GAP SERPL CALC-SCNC: 8 MMOL/L — SIGNIFICANT CHANGE UP (ref 5–17)
ANION GAP SERPL CALC-SCNC: 9 MMOL/L — SIGNIFICANT CHANGE UP (ref 5–17)
APTT BLD: 32.9 SEC — SIGNIFICANT CHANGE UP (ref 27.5–35.5)
AST SERPL-CCNC: SIGNIFICANT CHANGE UP (ref 10–40)
AST SERPL-CCNC: SIGNIFICANT CHANGE UP (ref 10–40)
BASOPHILS # BLD AUTO: 0.04 K/UL — SIGNIFICANT CHANGE UP (ref 0–0.2)
BASOPHILS NFR BLD AUTO: 0.7 % — SIGNIFICANT CHANGE UP (ref 0–2)
BILIRUB SERPL-MCNC: 0.2 MG/DL — SIGNIFICANT CHANGE UP (ref 0.2–1.2)
BILIRUB SERPL-MCNC: 0.3 MG/DL — SIGNIFICANT CHANGE UP (ref 0.2–1.2)
BUN SERPL-MCNC: 11 MG/DL — SIGNIFICANT CHANGE UP (ref 7–23)
BUN SERPL-MCNC: 11 MG/DL — SIGNIFICANT CHANGE UP (ref 7–23)
CALCIUM SERPL-MCNC: 8.4 MG/DL — SIGNIFICANT CHANGE UP (ref 8.4–10.5)
CALCIUM SERPL-MCNC: 8.8 MG/DL — SIGNIFICANT CHANGE UP (ref 8.4–10.5)
CHLORIDE SERPL-SCNC: 100 MMOL/L — SIGNIFICANT CHANGE UP (ref 96–108)
CHLORIDE SERPL-SCNC: 100 MMOL/L — SIGNIFICANT CHANGE UP (ref 96–108)
CO2 SERPL-SCNC: 25 MMOL/L — SIGNIFICANT CHANGE UP (ref 22–31)
CO2 SERPL-SCNC: 25 MMOL/L — SIGNIFICANT CHANGE UP (ref 22–31)
CREAT SERPL-MCNC: 0.73 MG/DL — SIGNIFICANT CHANGE UP (ref 0.5–1.3)
CREAT SERPL-MCNC: 0.78 MG/DL — SIGNIFICANT CHANGE UP (ref 0.5–1.3)
EOSINOPHIL # BLD AUTO: 0.04 K/UL — SIGNIFICANT CHANGE UP (ref 0–0.5)
EOSINOPHIL NFR BLD AUTO: 0.7 % — SIGNIFICANT CHANGE UP (ref 0–6)
GLUCOSE SERPL-MCNC: 111 MG/DL — HIGH (ref 70–99)
GLUCOSE SERPL-MCNC: 131 MG/DL — HIGH (ref 70–99)
HCT VFR BLD CALC: 40.7 % — SIGNIFICANT CHANGE UP (ref 34.5–45)
HGB BLD-MCNC: 13.2 G/DL — SIGNIFICANT CHANGE UP (ref 11.5–15.5)
IMM GRANULOCYTES NFR BLD AUTO: 0.7 % — SIGNIFICANT CHANGE UP (ref 0–1.5)
INR BLD: 1.05 — SIGNIFICANT CHANGE UP (ref 0.88–1.16)
LIDOCAIN IGE QN: 54 U/L — SIGNIFICANT CHANGE UP (ref 7–60)
LYMPHOCYTES # BLD AUTO: 1.97 K/UL — SIGNIFICANT CHANGE UP (ref 1–3.3)
LYMPHOCYTES # BLD AUTO: 33.5 % — SIGNIFICANT CHANGE UP (ref 13–44)
MAGNESIUM SERPL-MCNC: 1.8 MG/DL — SIGNIFICANT CHANGE UP (ref 1.6–2.6)
MCHC RBC-ENTMCNC: 29 PG — SIGNIFICANT CHANGE UP (ref 27–34)
MCHC RBC-ENTMCNC: 32.4 GM/DL — SIGNIFICANT CHANGE UP (ref 32–36)
MCV RBC AUTO: 89.5 FL — SIGNIFICANT CHANGE UP (ref 80–100)
MONOCYTES # BLD AUTO: 0.5 K/UL — SIGNIFICANT CHANGE UP (ref 0–0.9)
MONOCYTES NFR BLD AUTO: 8.5 % — SIGNIFICANT CHANGE UP (ref 2–14)
NEUTROPHILS # BLD AUTO: 3.29 K/UL — SIGNIFICANT CHANGE UP (ref 1.8–7.4)
NEUTROPHILS NFR BLD AUTO: 55.9 % — SIGNIFICANT CHANGE UP (ref 43–77)
NRBC # BLD: 0 /100 WBCS — SIGNIFICANT CHANGE UP (ref 0–0)
NT-PROBNP SERPL-SCNC: 168 PG/ML — SIGNIFICANT CHANGE UP (ref 0–300)
PLATELET # BLD AUTO: 234 K/UL — SIGNIFICANT CHANGE UP (ref 150–400)
POTASSIUM SERPL-MCNC: SIGNIFICANT CHANGE UP (ref 3.5–5.3)
POTASSIUM SERPL-MCNC: SIGNIFICANT CHANGE UP (ref 3.5–5.3)
POTASSIUM SERPL-SCNC: SIGNIFICANT CHANGE UP (ref 3.5–5.3)
POTASSIUM SERPL-SCNC: SIGNIFICANT CHANGE UP (ref 3.5–5.3)
PROT SERPL-MCNC: 6.3 G/DL — SIGNIFICANT CHANGE UP (ref 6–8.3)
PROT SERPL-MCNC: 6.8 G/DL — SIGNIFICANT CHANGE UP (ref 6–8.3)
PROTHROM AB SERPL-ACNC: 12.6 SEC — SIGNIFICANT CHANGE UP (ref 10.6–13.6)
RBC # BLD: 4.55 M/UL — SIGNIFICANT CHANGE UP (ref 3.8–5.2)
RBC # FLD: 12.3 % — SIGNIFICANT CHANGE UP (ref 10.3–14.5)
SARS-COV-2 RNA SPEC QL NAA+PROBE: SIGNIFICANT CHANGE UP
SODIUM SERPL-SCNC: 133 MMOL/L — LOW (ref 135–145)
SODIUM SERPL-SCNC: 134 MMOL/L — LOW (ref 135–145)
TROPONIN T SERPL-MCNC: <0.01 NG/ML — SIGNIFICANT CHANGE UP (ref 0–0.01)
WBC # BLD: 5.88 K/UL — SIGNIFICANT CHANGE UP (ref 3.8–10.5)
WBC # FLD AUTO: 5.88 K/UL — SIGNIFICANT CHANGE UP (ref 3.8–10.5)

## 2020-11-13 PROCEDURE — 71045 X-RAY EXAM CHEST 1 VIEW: CPT | Mod: 26

## 2020-11-13 PROCEDURE — 93010 ELECTROCARDIOGRAM REPORT: CPT

## 2020-11-13 PROCEDURE — 99285 EMERGENCY DEPT VISIT HI MDM: CPT

## 2020-11-13 RX ORDER — MAGNESIUM OXIDE 400 MG ORAL TABLET 241.3 MG
400 TABLET ORAL ONCE
Refills: 0 | Status: COMPLETED | OUTPATIENT
Start: 2020-11-13 | End: 2020-11-14

## 2020-11-13 RX ORDER — NITROGLYCERIN 6.5 MG
0.4 CAPSULE, EXTENDED RELEASE ORAL
Refills: 0 | Status: DISCONTINUED | OUTPATIENT
Start: 2020-11-13 | End: 2020-11-19

## 2020-11-13 RX ORDER — NITROGLYCERIN 6.5 MG
0.4 CAPSULE, EXTENDED RELEASE ORAL ONCE
Refills: 0 | Status: COMPLETED | OUTPATIENT
Start: 2020-11-13 | End: 2020-11-13

## 2020-11-13 RX ORDER — ASPIRIN/CALCIUM CARB/MAGNESIUM 324 MG
325 TABLET ORAL ONCE
Refills: 0 | Status: COMPLETED | OUTPATIENT
Start: 2020-11-13 | End: 2020-11-13

## 2020-11-13 RX ORDER — HEPARIN SODIUM 5000 [USP'U]/ML
5000 INJECTION INTRAVENOUS; SUBCUTANEOUS EVERY 8 HOURS
Refills: 0 | Status: DISCONTINUED | OUTPATIENT
Start: 2020-11-13 | End: 2020-11-14

## 2020-11-13 RX ORDER — METOPROLOL TARTRATE 50 MG
50 TABLET ORAL DAILY
Refills: 0 | Status: DISCONTINUED | OUTPATIENT
Start: 2020-11-13 | End: 2020-11-14

## 2020-11-13 RX ADMIN — Medication 0.4 MILLIGRAM(S): at 20:18

## 2020-11-13 RX ADMIN — Medication 325 MILLIGRAM(S): at 20:18

## 2020-11-13 NOTE — H&P ADULT - NSHPPHYSICALEXAM_GEN_ALL_CORE
T(C): 36.6 (11-13-20 @ 19:33), Max: 36.6 (11-13-20 @ 19:33)  HR: 74 (11-13-20 @ 20:39) (74 - 84)  BP: 166/79 (11-13-20 @ 20:39) (165/100 - 166/79)  RR: 18 (11-13-20 @ 19:33) (18 - 18)  SpO2: 99% (11-13-20 @ 19:33) (99% - 99%)  Wt(kg): --    Appearance: Normal	  HEENT:   Normal oral mucosa, PERRL, EOMI	  Neck: Supple, - JVD; No Carotid Bruit and 2+ pulses B/L  Cardiovascular: Normal S1 S2, No JVD, No murmurs  Respiratory: Lungs clear to auscultation No Rales, Rhonchi, Wheezing	  Gastrointestinal:  Soft, Non-tender, + BS	  Skin: No rashes, No ecchymoses, No cyanosis  Extremities: Normal range of motion, No clubbing, cyanosis or edema  Vascular: Femoral pulses 2+ b/l without bruit, DP 1+ b/l, PT 1+ b/l  Neurologic: Non-focal  Psychiatry: A & O x 3, Mood & affect appropriate

## 2020-11-13 NOTE — H&P ADULT - NSHPLABSRESULTS_GEN_ALL_CORE
13.2   5.88  )-----------( 234      ( 13 Nov 2020 20:05 )             40.7       11-13    133<L>  |  100  |  11  ----------------------------<  131<H>  See Note   |  25  |  0.78    Ca    8.4      13 Nov 2020 20:53  Mg     1.8     11-13    TPro  6.3  /  Alb  3.7  /  TBili  0.2  /  DBili  x   /  AST  See Note  /  ALT  See Note  /  AlkPhos  44  11-13      PT/INR - ( 13 Nov 2020 20:05 )   PT: 12.6 sec;   INR: 1.05          PTT - ( 13 Nov 2020 20:05 )  PTT:32.9 sec    CARDIAC MARKERS ( 13 Nov 2020 20:05 )  x     / <0.01 ng/mL / x     / x     / x                EKG: NSR@76bpm with non specific ST-T wave changes

## 2020-11-13 NOTE — ED ADULT NURSE NOTE - CHPI ED NUR SYMPTOMS NEG
no back pain/no vomiting/no shortness of breath/no diaphoresis/no fever/no syncope/no nausea/no chills/no congestion/no dizziness

## 2020-11-13 NOTE — ED PROVIDER NOTE - CLINICAL SUMMARY MEDICAL DECISION MAKING FREE TEXT BOX
Patient presents to ED with concern for persistent cp over the past several days in setting of known CAD with multiple stents in place.  Patient is followed by Dr. Rose who is requesting cardiac tele admission for likely cardiac catheterization.  Patient is given nitro SL, ASA and case is discussed with cardio fellow who accepts patient on behalf of cardiologist on call, Dr. Valdez.  Initial trop negative.  CXR clear.  EKG without evidence of ischemic changes.  Dr. Rose to follow on admission, however requesting admission go to on call cardiologist.

## 2020-11-13 NOTE — H&P ADULT - HISTORY OF PRESENT ILLNESS
A&O X3 Full Code    34 y/o female with PMHx of HTN, HLD, CAD vs. coronary vasospasm s/p PTCA X3, L hemispheric TIAs (intermittent speech difficulties) with vascular imaging (MRA, DSA) that revealed bilateral ICA occlusion with multiple sources of collateral flow to bilateral hemispheres (mostly PCA on R side, PCA and transdural MMA collateral on L side, consistent with severe bilateral moyamoya and recent abnormal CCTA 2/2 to intermittent , non exertion chest painwas on schedule for cardiac catheterization next week pres A&O X3 Full Code    34 y/o female with PMHx of HTN, HLD, CAD vs. coronary vasospasm s/p PTCA X3, L hemispheric TIAs (intermittent speech difficulties) with vascular imaging (MRA, DSA) that revealed bilateral ICA occlusion with multiple sources of collateral flow to bilateral hemispheres (mostly PCA on R side, PCA and transdural MMA collateral on L side, consistent with severe bilateral moyamoya and recent abnormal CCTA 2/2 to intermittent , non exertion chest pain was on schedule for cardiac catheterization next week presents to [facility] ER this evening, 11/13/20, by her Cardiologist, Dr. Rose, for Uns A&O X3 Full Code    36 y/o female with PMHx of HTN, HLD, CAD vs. coronary vasospasm s/p PTCA X3, L hemispheric TIAs (intermittent speech difficulties) with vascular imaging (MRA, DSA) that revealed bilateral ICA occlusion with multiple sources of collateral flow to bilateral hemispheres (mostly PCA on R side, PCA and transdural MMA collateral on L side, consistent with severe bilateral moyamoya and recent abnormal CCTA 2/2 to intermittent , non exertion chest pain was on schedule for cardiac catheterization next week presents to [facility] ER this evening, 11/13/20, by her Cardiologist, Dr. Rose, for Unstable Angina.      In speaking with patient, she states she was on the schedule for cardiac cath on       for abnormal CCTA and intermittent, non exertional, non radiating chest pain with no associated symptoms.  However,  pt. reports chest pain, described as               is worse today and could not wait for cardiac cath next week.  She called her Cardiologist, Dr. Rose who told her to take a SL Nitro which did not improve her chest pain.  Pt. denies sick contact, fever, HA, SOB, dizziness, diaphoresis, palpitations, abdominal discomfort, n/v and bowel changes.    In ER ECG reveals NSR@76bpm with non specific ST-T wave changes, Troponin neg X1, , Mg 1.8 (supplemented Mag Ox 400mg PO X1), K+ hemolysis (redraw) Na 133.  CXR reveals no acute pathology seen, follow up official report.  COVID 19    In light of patient's cardiac hx, UA and abnormal CCTA pt. is now admitted to [facility] for recommended Left Heart Catheterization with possible intervention early next week, TTE and medical management. A&O X3 Full Code   and  RN Nurse Tiffany  in ER    34 y/o female, Bah[place]ian speaking with PMHx of HTN, HLD, CAD vs. coronary vasospasm multiple PCI's and stent most recent Cardiac Cath on 8/18/16 @[facility] PTCA of prox Cx with LM patent stent; LAD patent stent; D1 patent stent, LCx Ostial 80%, L hemispheric TIAs (pt. denies residual deficits) with vascular imaging (MRA, DSA) that revealed bilateral ICA occlusion with multiple sources of collateral flow to bilateral hemispheres (mostly PCA on R side, PCA and transdural MMA collateral on L side, consistent with severe bilateral moyamoya, hx of Left Crani @[facility] 5/31/17 with Dr. Long,for Left STA-MCA bypass direct and indirect (2 revisions of anastomosis and repositioning graft) complicated by acute intracranial hemorrhage in superior left frontal lobe and recent abnormal CCTA 2/2 to intermittent , non exertion chest pain was on schedule for cardiac catheterization next week presents to [facility] ER this evening, 11/13/20, by her Cardiologist, Dr. Rose, for Unstable Angina.      In speaking with patient, she states she was on the schedule for cardiac cath on 11/19/20, Thursday with Dr. BRENDA Barrientos,      for abnormal CCTA and intermittent, non exertional, non radiating chest pain with no associated symptoms.  However,  pt. reports chest pain, described as  "heavy" pressure, 8/10 is worse today and could not wait for cardiac cath next week.  She called her Cardiologist, Dr. Rose who told her to take a SL Nitro which did not improve her chest pain.  Pt. denies sick contact, fever, HA, SOB, dizziness, diaphoresis, palpitations, abdominal discomfort, n/v and bowel changes.    In ER ECG reveals NSR@76bpm with non specific ST-T wave changes, Troponin neg X1, , Mg 1.8 (supplemented Mag Ox 400mg PO X1), K+ hemolysis (redraw) Na 133.  CXR reveals no acute pathology seen, follow up official report.  COVID 19    In light of patient's cardiac hx, UA and abnormal CCTA pt. is now admitted to [facility] for recommended Left Heart Catheterization with possible intervention early next week, TTE and medical management. A&O X3 Full Code   and  RN Nurse Tiffany  in ER    36 y/o female, Kos[place] speaking with PMHx of HTN, HLD, CAD vs. coronary vasospasm multiple PCI's and stent most recent Cardiac Cath on 8/18/16 @[facility] PTCA of prox Cx with LM patent stent; LAD patent stent; D1 patent stent, LCx Ostial 80%, L hemispheric TIAs (pt. denies residual deficits) with vascular imaging (MRA, DSA) that revealed bilateral ICA occlusion with multiple sources of collateral flow to bilateral hemispheres (mostly PCA on R side, PCA and transdural MMA collateral on L side, consistent with severe bilateral moyamoya, hx of Left Crani @[facility] 5/31/17 with Dr. Long,for Left STA-MCA bypass direct and indirect (2 revisions of anastomosis and repositioning graft) complicated by acute intracranial hemorrhage in superior left frontal lobe and recent abnormal CCTA 2/2 to intermittent , non exertion chest pain was on schedule for cardiac catheterization next week presents to [facility] ER this evening, 11/13/20, by her Cardiologist, Dr. Rose, for Unstable Angina.      In speaking with patient, she states she was on the schedule for cardiac cath on 11/19/20, Thursday with Dr. BRENDA Barrientos,      for abnormal CCTA and intermittent, non exertional, non radiating chest pain with no associated symptoms.  However,  pt. reports chest pain, described as  "heavy" pressure, 8/10 is worse today and could not wait for cardiac cath next week.  She called her Cardiologist, Dr. Rose who told her to take a SL Nitro which did not improve her chest pain.  Pt. denies sick contact, fever, HA, SOB, dizziness, diaphoresis, palpitations, abdominal discomfort, n/v and bowel changes.    In ER ECG reveals NSR@76bpm with non specific ST-T wave changes, Troponin neg X1, , Mg 1.8 (supplemented Mag Ox 400mg PO X1), K+ hemolysis (redraw) Na 133.  CXR reveals no acute pathology seen, follow up official report.  COVID 19 non dected    In light of patient's cardiac hx, UA and abnormal CCTA pt. is now admitted to [facility] for recommended Left Heart Catheterization with possible intervention early next week, TTE and medical management.

## 2020-11-13 NOTE — H&P ADULT - NSICDXPASTMEDICALHX_GEN_ALL_CORE_FT
PAST MEDICAL HISTORY:  CAD (coronary artery disease) Hx Cardiac stent which was re occluded then open Hx of cardiac catheterization PTCA X3 on Brilinta Aspirin    Dyslipidemia     Epilepsy Epilepsy    Essential hypertension Hypertension    Moyamoya disease     TIA (transient ischemic attack) x 4

## 2020-11-13 NOTE — H&P ADULT - ASSESSMENT
36 y/o female with PMHx of HTN, HLD, CAD vs. coronary vasospasm s/p PTCA X3, L hemispheric TIAs (intermittent speech difficulties) with vascular imaging (MRA, DSA) that revealed bilateral ICA occlusion with multiple sources of collateral flow to bilateral hemispheres (mostly PCA on R side, PCA and transdural MMA collateral on L side, consistent with severe bilateral moyamoya and recent abnormal CCTA 2/2 to intermittent , non exertion chest pain was on schedule for cardiac catheterization next week presents to [facility] ER this evening, 11/13/20, by her Cardiologist, Dr. Rose, for Unstable Angina.

## 2020-11-13 NOTE — H&P ADULT - PROBLEM SELECTOR PLAN 1
Telemetry, Troponin neg X1; trend Troponin, ECG, Hx of Cardiac Cath with PCI's most recent 8/18/2016 @[facility] with Dr. JERI Barrientos, PTCA pCX; LM patent stent; pLAD patent stent; D1 patent stent, LCx Ostial 80% ddecrete lesion RCA angiographically normal.  Pt. was on Cath Schedue 11/19/20 with Dr. JERI Barrientos for UA and abnormal CCTA revealing possible restenosis of one of stent.  Cath will need to be moved up.  Please contact Dr. JERI Barrientos tomorrow  Pt. on Brilinta 60mg PO bid

## 2020-11-13 NOTE — ED PROVIDER NOTE - OBJECTIVE STATEMENT
35 year old female with extensive medical history including HTN, dyslipidemia, CAD vs coronary vasospasm s/p PTCA x 3, L hemispheric TIAs (intermittent speech difficulties) w vascular imaging (MRA, DSA) that revealed bilateral ICA occlusion with multiple sources of collateral flow to bilateral hemispheres (mostly PCA on R side, PCA and transdural MMA collaterals on L side), consistent with severe bilateral moyamoya presents to ED at her cardiologist, Dr. Rose, request secondary to concern for recent chest discomfort.  Patient states she was scheduled for cardiac catheterization next week, however contacted Dr. Rose today stating she could not wait due to worsening pain and was directed to ED for expedited care.  Patient notes persistent chest discomfort without radiation or associated symptoms.  She took SL nitro earlier today under the direction of Dr. Rose, however did not feel as if it were improving her symptoms.  She denies associated fever, chills, shortness of breath, abdominal pain, nausea, emesis, changes to bowel movements, peripheral edema, rashes, recent travel, known sick contacts or any additional acute complaints or concerns at this time.

## 2020-11-13 NOTE — H&P ADULT - PROBLEM SELECTOR PLAN 2
hx of multiple PCI's with Stent.  Most recent Cath on 8/18/2016 @[facility] with Dr. Barrientos PTCA pCX; LM patent stent; pLAD patent stent; D1 patent stent, LCX Ostial 80% descrete lesion RCA angiographically    Pt. on Sched for Cath with Dr. BRENDA Barrientos on 11/19/20 will need to move up 2/2 to UA and abnormal CCTA.  TTE in AM.   Pt. on Brilinta 60mg PO bid; Ranexa 1000mg PO bid, Metoprolol XL 50mg PO bid, Imdur 30mg PO at night and Imdur 60mg PO day. Atorvastatin 80mg PO daily

## 2020-11-13 NOTE — ED PROVIDER NOTE - NS ED ROS FT
Constitutional: No fever or chills.   Eyes: No pain, blurry vision, or discharge.  ENMT: No hearing changes, pain, discharge or infections. No neck pain or stiffness.  Cardiac: + chest pain, no SOB or edema.   Respiratory: No cough or respiratory distress. No hemoptysis. No history of asthma or RAD.  GI: No nausea, vomiting, diarrhea or abdominal pain.  : No dysuria, frequency or burning.  MS: No myalgia, muscle weakness, joint pain or back pain.  Neuro: No headache or weakness. No LOC.  Skin: No skin rash.   Endocrine: No history of thyroid disease or diabetes.  Except as documented in the HPI, all other systems are negative.

## 2020-11-13 NOTE — H&P ADULT - NSHPREVIEWOFSYSTEMS_GEN_ALL_CORE
GENERAL, CONSTITUTIONAL : denies recent weight loss, fever, chills  EYES, VISION: denies changes in vision   EARS, NOSE, THROAT: denies hearing loss  HEART, CARDIOVASCULAR: admits to  chest pain, denies arrhythmia, palpitations,   RESPIRATORY: Denies cough, SOB, wheezing, PND, orthopnea  GASTROINTESTINAL: Denies abdominal pain, heartburn, bloody stool, dark tarry stool  GENITOURINARY: Denies frequent urination, urgency  MUSCULOSKELETAL denies joint pain or swelling, restricted motion, musculoskeletal pain.   SKIN & INTEGUMENTARY Denies rashes, sores, blisters, blisters, growths.  NEUROLOGICAL: Denies numbness or tingling sensations, sensation loss, burning.   PSYCHIATRIC: Denies nervousness, anxiety, depression  ENDOCRINE Denies heat or cold intolerance, excessive thirst  HEMATOLOGIC/LYMPHATIC: Denies abnormal bleeding, bleeding of any kind

## 2020-11-14 ENCOUNTER — TRANSCRIPTION ENCOUNTER (OUTPATIENT)
Age: 35
End: 2020-11-14

## 2020-11-14 LAB
A1C WITH ESTIMATED AVERAGE GLUCOSE RESULT: 5.3 % — SIGNIFICANT CHANGE UP (ref 4–5.6)
ALBUMIN SERPL ELPH-MCNC: 4.1 G/DL — SIGNIFICANT CHANGE UP (ref 3.3–5)
ALP SERPL-CCNC: 51 U/L — SIGNIFICANT CHANGE UP (ref 40–120)
ALT FLD-CCNC: 15 U/L — SIGNIFICANT CHANGE UP (ref 10–45)
ANION GAP SERPL CALC-SCNC: 10 MMOL/L — SIGNIFICANT CHANGE UP (ref 5–17)
APTT BLD: 33.1 SEC — SIGNIFICANT CHANGE UP (ref 27.5–35.5)
AST SERPL-CCNC: 16 U/L — SIGNIFICANT CHANGE UP (ref 10–40)
BASOPHILS # BLD AUTO: 0.04 K/UL — SIGNIFICANT CHANGE UP (ref 0–0.2)
BASOPHILS NFR BLD AUTO: 0.6 % — SIGNIFICANT CHANGE UP (ref 0–2)
BILIRUB DIRECT SERPL-MCNC: <0.2 MG/DL — SIGNIFICANT CHANGE UP (ref 0–0.2)
BILIRUB INDIRECT FLD-MCNC: SIGNIFICANT CHANGE UP (ref 0.2–1)
BILIRUB SERPL-MCNC: 0.3 MG/DL — SIGNIFICANT CHANGE UP (ref 0.2–1.2)
BUN SERPL-MCNC: 11 MG/DL — SIGNIFICANT CHANGE UP (ref 7–23)
CALCIUM SERPL-MCNC: 9.3 MG/DL — SIGNIFICANT CHANGE UP (ref 8.4–10.5)
CHLORIDE SERPL-SCNC: 103 MMOL/L — SIGNIFICANT CHANGE UP (ref 96–108)
CHOLEST SERPL-MCNC: 141 MG/DL — SIGNIFICANT CHANGE UP
CK MB CFR SERPL CALC: <1 NG/ML — SIGNIFICANT CHANGE UP (ref 0–6.7)
CK MB CFR SERPL CALC: <1 NG/ML — SIGNIFICANT CHANGE UP (ref 0–6.7)
CK SERPL-CCNC: 97 U/L — SIGNIFICANT CHANGE UP (ref 25–170)
CK SERPL-CCNC: 97 U/L — SIGNIFICANT CHANGE UP (ref 25–170)
CO2 SERPL-SCNC: 24 MMOL/L — SIGNIFICANT CHANGE UP (ref 22–31)
CREAT SERPL-MCNC: 0.78 MG/DL — SIGNIFICANT CHANGE UP (ref 0.5–1.3)
EOSINOPHIL # BLD AUTO: 0.04 K/UL — SIGNIFICANT CHANGE UP (ref 0–0.5)
EOSINOPHIL NFR BLD AUTO: 0.6 % — SIGNIFICANT CHANGE UP (ref 0–6)
ESTIMATED AVERAGE GLUCOSE: 105 MG/DL — SIGNIFICANT CHANGE UP (ref 68–114)
GLUCOSE SERPL-MCNC: 102 MG/DL — HIGH (ref 70–99)
HCT VFR BLD CALC: 44.1 % — SIGNIFICANT CHANGE UP (ref 34.5–45)
HDLC SERPL-MCNC: 43 MG/DL — LOW
HGB BLD-MCNC: 14.3 G/DL — SIGNIFICANT CHANGE UP (ref 11.5–15.5)
IMM GRANULOCYTES NFR BLD AUTO: 0.5 % — SIGNIFICANT CHANGE UP (ref 0–1.5)
LIPID PNL WITH DIRECT LDL SERPL: 74 MG/DL — SIGNIFICANT CHANGE UP
LYMPHOCYTES # BLD AUTO: 2.29 K/UL — SIGNIFICANT CHANGE UP (ref 1–3.3)
LYMPHOCYTES # BLD AUTO: 35.9 % — SIGNIFICANT CHANGE UP (ref 13–44)
MAGNESIUM SERPL-MCNC: 2.1 MG/DL — SIGNIFICANT CHANGE UP (ref 1.6–2.6)
MCHC RBC-ENTMCNC: 29.1 PG — SIGNIFICANT CHANGE UP (ref 27–34)
MCHC RBC-ENTMCNC: 32.4 GM/DL — SIGNIFICANT CHANGE UP (ref 32–36)
MCV RBC AUTO: 89.6 FL — SIGNIFICANT CHANGE UP (ref 80–100)
MONOCYTES # BLD AUTO: 0.54 K/UL — SIGNIFICANT CHANGE UP (ref 0–0.9)
MONOCYTES NFR BLD AUTO: 8.5 % — SIGNIFICANT CHANGE UP (ref 2–14)
NEUTROPHILS # BLD AUTO: 3.44 K/UL — SIGNIFICANT CHANGE UP (ref 1.8–7.4)
NEUTROPHILS NFR BLD AUTO: 53.9 % — SIGNIFICANT CHANGE UP (ref 43–77)
NON HDL CHOLESTEROL: 98 MG/DL — SIGNIFICANT CHANGE UP
NRBC # BLD: 0 /100 WBCS — SIGNIFICANT CHANGE UP (ref 0–0)
PLATELET # BLD AUTO: 248 K/UL — SIGNIFICANT CHANGE UP (ref 150–400)
POTASSIUM SERPL-MCNC: 4.4 MMOL/L — SIGNIFICANT CHANGE UP (ref 3.5–5.3)
POTASSIUM SERPL-SCNC: 4.4 MMOL/L — SIGNIFICANT CHANGE UP (ref 3.5–5.3)
PROT SERPL-MCNC: 6.8 G/DL — SIGNIFICANT CHANGE UP (ref 6–8.3)
RBC # BLD: 4.92 M/UL — SIGNIFICANT CHANGE UP (ref 3.8–5.2)
RBC # FLD: 12.6 % — SIGNIFICANT CHANGE UP (ref 10.3–14.5)
SODIUM SERPL-SCNC: 137 MMOL/L — SIGNIFICANT CHANGE UP (ref 135–145)
TRIGL SERPL-MCNC: 119 MG/DL — SIGNIFICANT CHANGE UP
TROPONIN T SERPL-MCNC: <0.01 NG/ML — SIGNIFICANT CHANGE UP (ref 0–0.01)
TROPONIN T SERPL-MCNC: <0.01 NG/ML — SIGNIFICANT CHANGE UP (ref 0–0.01)
WBC # BLD: 6.38 K/UL — SIGNIFICANT CHANGE UP (ref 3.8–10.5)
WBC # FLD AUTO: 6.38 K/UL — SIGNIFICANT CHANGE UP (ref 3.8–10.5)

## 2020-11-14 PROCEDURE — 93306 TTE W/DOPPLER COMPLETE: CPT | Mod: 26

## 2020-11-14 PROCEDURE — 93010 ELECTROCARDIOGRAM REPORT: CPT

## 2020-11-14 PROCEDURE — 99222 1ST HOSP IP/OBS MODERATE 55: CPT

## 2020-11-14 RX ORDER — ISOSORBIDE MONONITRATE 60 MG/1
30 TABLET, EXTENDED RELEASE ORAL
Refills: 0 | Status: DISCONTINUED | OUTPATIENT
Start: 2020-11-14 | End: 2020-11-19

## 2020-11-14 RX ORDER — ISOSORBIDE MONONITRATE 60 MG/1
60 TABLET, EXTENDED RELEASE ORAL
Refills: 0 | Status: DISCONTINUED | OUTPATIENT
Start: 2020-11-14 | End: 2020-11-19

## 2020-11-14 RX ORDER — RANOLAZINE 500 MG/1
1000 TABLET, FILM COATED, EXTENDED RELEASE ORAL
Refills: 0 | Status: DISCONTINUED | OUTPATIENT
Start: 2020-11-14 | End: 2020-11-19

## 2020-11-14 RX ORDER — TICAGRELOR 90 MG/1
60 TABLET ORAL
Refills: 0 | Status: DISCONTINUED | OUTPATIENT
Start: 2020-11-14 | End: 2020-11-16

## 2020-11-14 RX ORDER — METOPROLOL TARTRATE 50 MG
50 TABLET ORAL
Refills: 0 | Status: DISCONTINUED | OUTPATIENT
Start: 2020-11-14 | End: 2020-11-19

## 2020-11-14 RX ORDER — ATORVASTATIN CALCIUM 80 MG/1
80 TABLET, FILM COATED ORAL AT BEDTIME
Refills: 0 | Status: DISCONTINUED | OUTPATIENT
Start: 2020-11-14 | End: 2020-11-19

## 2020-11-14 RX ORDER — OXCARBAZEPINE 300 MG/1
600 TABLET, FILM COATED ORAL
Refills: 0 | Status: DISCONTINUED | OUTPATIENT
Start: 2020-11-14 | End: 2020-11-17

## 2020-11-14 RX ORDER — INFLUENZA VIRUS VACCINE 15; 15; 15; 15 UG/.5ML; UG/.5ML; UG/.5ML; UG/.5ML
0.5 SUSPENSION INTRAMUSCULAR ONCE
Refills: 0 | Status: COMPLETED | OUTPATIENT
Start: 2020-11-14 | End: 2020-11-14

## 2020-11-14 RX ORDER — FOLIC ACID 0.8 MG
1 TABLET ORAL DAILY
Refills: 0 | Status: DISCONTINUED | OUTPATIENT
Start: 2020-11-14 | End: 2020-11-19

## 2020-11-14 RX ADMIN — TICAGRELOR 60 MILLIGRAM(S): 90 TABLET ORAL at 18:36

## 2020-11-14 RX ADMIN — Medication 0.4 MILLIGRAM(S): at 18:36

## 2020-11-14 RX ADMIN — Medication 50 MILLIGRAM(S): at 06:44

## 2020-11-14 RX ADMIN — OXCARBAZEPINE 600 MILLIGRAM(S): 300 TABLET, FILM COATED ORAL at 18:30

## 2020-11-14 RX ADMIN — ISOSORBIDE MONONITRATE 60 MILLIGRAM(S): 60 TABLET, EXTENDED RELEASE ORAL at 06:43

## 2020-11-14 RX ADMIN — ATORVASTATIN CALCIUM 80 MILLIGRAM(S): 80 TABLET, FILM COATED ORAL at 21:57

## 2020-11-14 RX ADMIN — MAGNESIUM OXIDE 400 MG ORAL TABLET 400 MILLIGRAM(S): 241.3 TABLET ORAL at 00:01

## 2020-11-14 RX ADMIN — Medication 50 MILLIGRAM(S): at 00:01

## 2020-11-14 RX ADMIN — TICAGRELOR 60 MILLIGRAM(S): 90 TABLET ORAL at 07:11

## 2020-11-14 RX ADMIN — Medication 50 MILLIGRAM(S): at 18:30

## 2020-11-14 RX ADMIN — ISOSORBIDE MONONITRATE 30 MILLIGRAM(S): 60 TABLET, EXTENDED RELEASE ORAL at 21:57

## 2020-11-14 RX ADMIN — Medication 1 MILLIGRAM(S): at 11:44

## 2020-11-14 RX ADMIN — RANOLAZINE 1000 MILLIGRAM(S): 500 TABLET, FILM COATED, EXTENDED RELEASE ORAL at 06:43

## 2020-11-14 RX ADMIN — OXCARBAZEPINE 600 MILLIGRAM(S): 300 TABLET, FILM COATED ORAL at 07:10

## 2020-11-14 RX ADMIN — RANOLAZINE 1000 MILLIGRAM(S): 500 TABLET, FILM COATED, EXTENDED RELEASE ORAL at 18:30

## 2020-11-14 NOTE — PROGRESS NOTE ADULT - PROBLEM SELECTOR PLAN 2
hx of multiple PCI's with Stent.  Most recent Cath on 8/18/2016 @[facility] with Dr. Barrientos PTCA pCX; LM patent stent; pLAD patent stent; D1 patent stent, LCX Ostial 80% descrete lesion RCA angiographically    Pt. on Sched for Cath with Dr. BRENDA Barrientos on 11/19/20 will need to move up 2/2 to UA and abnormal CCTA.  TTE in AM.   Pt. on Brilinta 60mg PO bid; Ranexa 1000mg PO bid, Metoprolol XL 50mg PO bid, Imdur 30mg PO at night and Imdur 60mg PO day. Atorvastatin 80mg PO daily Pt w/ h/o multiple TIA's with no residual deficits as well as h/o severe bilateral moyamoya; left STA-MCA bypass with 2 revisions with Dr. Long in 2017  - Continue Atorvastatin 80mg PO daily. Pt w/ h/o multiple TIA's with no residual deficits as well as h/o severe bilateral moyamoya; left STA-MCA bypass with 2 revisions with Dr. Long in 2017 c/b acute intracranial hemorrhage  - Continue Atorvastatin 80mg PO daily

## 2020-11-14 NOTE — DISCHARGE NOTE PROVIDER - NSDCFUADDAPPT_GEN_ALL_CORE_FT
Please follow up with your Neurology/Epilepsy Provider, Dr. Lesly Walter at [address], [place] [address] on 12/2/2020 at 4:40pm.    Please bring your Insurance card(s), photo ID and discharge paperwork to your appointment.    Appointment was scheduled by Ms. DAVID De La Cruz, Referral Coordinator.

## 2020-11-14 NOTE — PROGRESS NOTE ADULT - PROBLEM SELECTOR PLAN 4
Monitor BP  On Metoprolol XL 50mg PO bid, Imdur 30mg PO PM Imdur 60mg PO AM, LDL 74, Trig 119, Hemoglobin A1c 5.3  - Continue with Atorvastatin 80mg PO daily

## 2020-11-14 NOTE — DISCHARGE NOTE PROVIDER - CARE PROVIDERS DIRECT ADDRESSES
[email or web address] ,[email or web address] ,[email or web address],DirectAddress_Unknown

## 2020-11-14 NOTE — DISCHARGE NOTE PROVIDER - PROVIDER TOKENS
PROVIDER:[TOKEN:[[id number]]] PROVIDER:[TOKEN:[[id number]]],PROVIDER:[TOKEN:[[id number]]],PROVIDER:[TOKEN:[[id number]]] PROVIDER:[TOKEN:[[id number]]],PROVIDER:[TOKEN:[[id number]]],FREE:[LAST:[Jose Angel],FIRST:[Lesly],PHONE:[phone]],FAX:[phone]],ADDRESS:[EEG/EPILEPSY  [address] [place] [address], [address] [place] [address]],SCHEDULEDAPPT:[12/02/2020],SCHEDULEDAPPTTIME:[04:40 PM]]

## 2020-11-14 NOTE — PROGRESS NOTE ADULT - SUBJECTIVE AND OBJECTIVE BOX
S: Pt seen and examined bedside.  Patient denies C/P, SOB, N/V, dizziness, palpitations, and diaphoresis.  Pt denies fever/chills, dysuria, abdominal pain, diarrhea, and cough  12 Point ROS otherwise negative except as per HPI/subjective.     O: Vital Signs Last 24 Hrs  T(C): 36.3 (2020 09:01), Max: 36.9 (2020 01:30)  T(F): 97.4 (2020 09:01), Max: 98.4 (2020 01:30)  HR: 70 (2020 08:35) (67 - 84)  BP: 150/82 (2020 08:35) (150/82 - 170/92)  BP(mean): --  RR: 18 (2020 08:35) (16 - 18)  SpO2: 98% (2020 08:35) (96% - 100%)    PHYSICAL EXAM:  GEN: NAD  HEENT: No JVD  PULM:  CTA B/L  CARD:  RRR, S1 and S2   ABD: +BS, NT, soft/ND	  EXT: No Edema B/L LE  NEURO: A+Ox3, no focal deficit  PSYCH: Mood Appropriate    LABS:                        14.3   6.38  )-----------( 248      ( 2020 05:55 )             44.1         137  |  103  |  11  ----------------------------<  102<H>  4.4   |  24  |  0.78    Ca    9.3      2020 05:55  Mg     2.1         TPro  6.8  /  Alb  4.1  /  TBili  0.3  /  DBili  <0.2  /  AST  16  /  ALT  15  /  AlkPhos  51      PT/INR - ( 2020 20:05 )   PT: 12.6 sec;   INR: 1.05          PTT - ( 2020 05:55 )  PTT:33.1 sec  Troponin T, Serum: <0.01 ng/mL (20 @ 05:55)  Troponin T, Serum: <0.01 ng/mL (20 @ 00:00)  Troponin T, Serum: <0.01 ng/mL (20 @ 20:05)      Daily Height in cm: 172.72 (2020 19:33)    Daily Weight in k.3 (2020 01:30)   S: Pt seen and examined bedside. Pt reports she continues to have 2/10 substernal CP, is frustrated she has to stay through the weekend for cardiac cath.   Patient denies SOB, N/V, dizziness, palpitations, and diaphoresis.  Pt denies fever/chills, dysuria, abdominal pain, diarrhea, and cough  12 Point ROS otherwise negative except as per HPI/subjective.     O: Vital Signs Last 24 Hrs  T(C): 36.3 (2020 09:01), Max: 36.9 (2020 01:30)  T(F): 97.4 (2020 09:01), Max: 98.4 (2020 01:30)  HR: 70 (2020 08:35) (67 - 84)  BP: 150/82 (2020 08:35) (150/82 - 170/92)  BP(mean): --  RR: 18 (2020 08:35) (16 - 18)  SpO2: 98% (2020 08:35) (96% - 100%)    PHYSICAL EXAM:  GEN: NAD  HEENT: No JVD  PULM:  CTA B/L, no WRR  CARD:  RRR, S1 and S2, no murmur  ABD: +BS, NT, soft/ND	  EXT: No Edema B/L LE  NEURO: A+Ox3, no focal deficits  PSYCH: Mood Appropriate    LABS:                        14.3   6.38  )-----------( 248      ( 2020 05:55 )             44.1         137  |  103  |  11  ----------------------------<  102<H>  4.4   |  24  |  0.78    Ca    9.3      2020 05:55  Mg     2.1         TPro  6.8  /  Alb  4.1  /  TBili  0.3  /  DBili  <0.2  /  AST  16  /  ALT  15  /  AlkPhos  51      PT/INR - ( 2020 20:05 )   PT: 12.6 sec;   INR: 1.05          PTT - ( 2020 05:55 )  PTT:33.1 sec  Troponin T, Serum: <0.01 ng/mL (20 @ 05:55)  Troponin T, Serum: <0.01 ng/mL (20 @ 00:00)  Troponin T, Serum: <0.01 ng/mL (20 @ 20:05)      Daily Height in cm: 172.72 (2020 19:33)    Daily Weight in k.3 (2020 01:30)

## 2020-11-14 NOTE — DISCHARGE NOTE PROVIDER - NSDCCPCAREPLAN_GEN_ALL_CORE_FT
PRINCIPAL DISCHARGE DIAGNOSIS  Diagnosis: CAD (coronary artery disease)  Assessment and Plan of Treatment: You have a diagnosis of coronary artery disease and received a stent to your coronary artery.  You have been taking Brilinta(Ticagrelor) 60mg Twice Daily. You MUST continue taking the daily Brilinta to ensure your stents do not close. DO NOT STOP THESE MEDICATIONS FOR ANY REASON UNLESS OTHERWISE INDICATED BY YOUR CARDIOLOGIST BECAUSE THIS WILL PUT YOU AT RISK FOR A HEART ATTACK. You should refrain from strenuous activity and heavy lifting for 1 week. Please make a follow up appointment with your cardiologist within 1-2 weeks of your discharge. All of your prescriptions have been sent electronically to your pharmacy.        SECONDARY DISCHARGE DIAGNOSES  Diagnosis: Hyperlipidemia  Assessment and Plan of Treatment: Please continue your daily statin to ensure your cardiac stent remains open.      Diagnosis: Hypertension  Assessment and Plan of Treatment: You have a history of elevated blood pressure and you should continue your blood pressure medications as prescribed.       PRINCIPAL DISCHARGE DIAGNOSIS  Diagnosis: CAD (coronary artery disease)  Assessment and Plan of Treatment: You have a diagnosis of coronary artery disease and received a stent to your coronary artery.  You have been taking Brilinta(Ticagrelor) 60mg Twice Daily. You MUST continue taking the daily Brilinta to ensure your stents do not close. DO NOT STOP THESE MEDICATIONS FOR ANY REASON UNLESS OTHERWISE INDICATED BY YOUR CARDIOLOGIST BECAUSE THIS WILL PUT YOU AT RISK FOR A HEART ATTACK. You should refrain from strenuous activity and heavy lifting for 1 week. Please make a follow up appointment with your cardiologist within 1-2 weeks of your discharge. All of your prescriptions have been sent electronically to your pharmacy.        SECONDARY DISCHARGE DIAGNOSES  Diagnosis: Seizures  Assessment and Plan of Treatment: You have a history of seizures and had a Seizure post cardiac cath likely stress induced.  Your Oxycarbenzepine was increased from 600mg to 900mg daily.  --CT Head negative.  --EEG revealed a:   --Please follow up with Epilepsy, Dr. Lesly Walter for further evaluation.    Diagnosis: Hyperlipidemia  Assessment and Plan of Treatment: Please continue your daily statin to prevent cholesterol build up in your stents.       Diagnosis: Hypertension  Assessment and Plan of Treatment: You have a history of elevated blood pressure and you should continue your blood pressure medications as prescribed.       PRINCIPAL DISCHARGE DIAGNOSIS  Diagnosis: CAD (coronary artery disease)  Assessment and Plan of Treatment: PRINCIPAL DISCHARGE DIAGNOSIS  Diagnosis: Coronary artery disease  Assessment and Plan of Treatment: - Please follow up with Dr. Rose in 1-2 weeks  - You underwent a diagnostic cardiac catheterization and was found Non-Obs Disease.    --You do have a history of known Coronary Artery Disease, so please  continue to take aspirin 81mg daily and  Brilinta 90mg twice daily  DO NOT STOP THESE MEDICATIONS AS THEY PREVENT YOUR STENT FROM CLOSING  You underwent a coronary angiogram and should wait 3 days before returning to ordinary activities. Do not drive for 2 days. Consult your doctor before returning to vigorous activity. You may return to work in 3-5 days. The catheter from your groin was removed and you should remove the dressing in 24 hours. You may shower once the dressing is removed, but avoid baths, hot tubs, or swimming for 5 days to prevent infection. If you notice bleeding from the site, hardening and pain at the site, drainage or redness from the site, coolness/paleness of the extremity, swelling, or fever, please call [phone]        SECONDARY DISCHARGE DIAGNOSES  Diagnosis: Seizures  Assessment and Plan of Treatment: You have a history of seizures and had a Seizure post cardiac cath likely stress induced.  Your Oxycarbenzepine was increased from 600mg to 900mg daily.  --CT Head negative.  --EEG revealed a:   --Please follow up with Epilepsy, Dr. Lesly Walter for further evaluation.  --Also  please continue to follow up for Neurologist. Dr. Barney and Neurosurgery, Dr. Long for continued neurology evaluation.    Diagnosis: Hyperlipidemia  Assessment and Plan of Treatment: Please continue your daily statin to prevent cholesterol build up in your stents.   --Continue Atorvastatin 80mg daily      Diagnosis: Hypertension  Assessment and Plan of Treatment: You have a history of elevated blood pressure and you should continue your blood pressure medications as prescribed.  --Continue Metoprolol Succinate 50mg daily, Imdur 60mg daily in AM and Imdur 30mg daily PM     PRINCIPAL DISCHARGE DIAGNOSIS  Diagnosis: CAD (coronary artery disease)  Assessment and Plan of Treatment: PRINCIPAL DISCHARGE DIAGNOSIS  Diagnosis: Coronary artery disease  Assessment and Plan of Treatment: - Please follow up with Dr. Rose in 1-2 weeks  - You underwent a diagnostic cardiac catheterization and was found Non-Obs Disease.    --You do have a history of known Coronary Artery Disease, so please  continue to take aspirin 81mg daily and  Brilinta 90mg twice daily  DO NOT STOP THESE MEDICATIONS AS THEY PREVENT YOUR STENT FROM CLOSING  You underwent a coronary angiogram and should wait 3 days before returning to ordinary activities. Do not drive for 2 days. Consult your doctor before returning to vigorous activity. You may return to work in 3-5 days. The catheter from your groin was removed and you should remove the dressing in 24 hours. You may shower once the dressing is removed, but avoid baths, hot tubs, or swimming for 5 days to prevent infection. If you notice bleeding from the site, hardening and pain at the site, drainage or redness from the site, coolness/paleness of the extremity, swelling, or fever, please call [phone]        SECONDARY DISCHARGE DIAGNOSES  Diagnosis: Seizures  Assessment and Plan of Treatment: You have a history of seizures and had a Seizure post cardiac cath likely stress induced.  Your Oxycarbenzepine was increased from 600mg to 900mg daily. We recommended you stay in the hospital for further workup as it may be due to low sodium levels due to your medication. We would like to continue monitoring for seizures, adjusting medication, and correcting your sodium levels. However, you requested to leave the hospital against medical advice and verbalized understanding of the risks of leaving early, including repeat seizures, brain damage, and death.  --Please follow up with Epilepsy, Dr. Lesly Walter for further evaluation.  --Also  please continue to follow up for Neurologist. Dr. Barney and Neurosurgery, Dr. Long for continued neurology evaluation.  Tiene antecedentes de convulsiones y tuvo gianna convulsión después de un cateterismo cardíaco probablemente inducida por estrés. Ragland Oxicarbenzepina se incrementó de 600 mg a 900 mg al día. Le recomendamos que permanezca en el hospital para realizar más estudios, ya que puede deberse a niveles bajos de sodio debido a ragland medicación. Nos gustaría seguir controlando las convulsiones, ajustando la medicación y corrigiendo john niveles de sodio. Sin embargo, solicitó salir del hospital en contra de los consejos médicos y la comprensión verbalizada de los riesgos de irse antes de tiempo, incluidas las convulsiones repetidas, el daño cerebral y la muerte.  --Britni un seguimiento con Epilepsy, Dra. Lesly Walter para gianna evaluación adicional.  --También continúe con el seguimiento del neurólogo. Dr. Barney y Neurocirugía, Dr. Long para gianna evaluación neurológica continua.    Diagnosis: Hyperlipidemia  Assessment and Plan of Treatment: Please continue your daily statin to prevent cholesterol build up in your stents.   --Continue Atorvastatin 80mg daily      Diagnosis: Hypertension  Assessment and Plan of Treatment: You have a history of elevated blood pressure and you should continue your blood pressure medications as prescribed.  --Continue Metoprolol Succinate 50mg daily, Imdur 60mg daily in AM and Imdur 30mg daily PM     PRINCIPAL DISCHARGE DIAGNOSIS  Diagnosis: CAD (coronary artery disease)  Assessment and Plan of Treatment: PRINCIPAL DISCHARGE DIAGNOSIS  Diagnosis: Coronary artery disease  Assessment and Plan of Treatment: - Please follow up with Dr. Rose in 1-2 weeks  - You underwent a diagnostic cardiac catheterization and was found Non-Obs Disease.    --You do have a history of known Coronary Artery Disease, so please  continue to take aspirin 81mg daily and  Brilinta 90mg twice daily  DO NOT STOP THESE MEDICATIONS AS THEY PREVENT YOUR STENT FROM CLOSING  You underwent a coronary angiogram and should wait 3 days before returning to ordinary activities. Do not drive for 2 days. Consult your doctor before returning to vigorous activity. You may return to work in 3-5 days. The catheter from your groin was removed and you should remove the dressing in 24 hours. You may shower once the dressing is removed, but avoid baths, hot tubs, or swimming for 5 days to prevent infection. If you notice bleeding from the site, hardening and pain at the site, drainage or redness from the site, coolness/paleness of the extremity, swelling, or fever, please call [phone]        SECONDARY DISCHARGE DIAGNOSES  Diagnosis: Seizures  Assessment and Plan of Treatment: You have a history of seizures and had a Seizure post cardiac cath likely stress induced.  Your Oxycarbenzepine was increased from 600mg to 900mg daily. We recommended you stay in the hospital for further workup as it may be due to low sodium levels due to your medication.   --Please follow up with Epilepsy, Dr. Lesly Walter for further evaluation.  --Also  please continue to follow up for Neurologist. Dr. Barney and Neurosurgery, Dr. Long for continued neurology evaluation.      Diagnosis: Hyperlipidemia  Assessment and Plan of Treatment: Please continue your daily statin to prevent cholesterol build up in your stents.   --Continue Atorvastatin 80mg daily      Diagnosis: Hypertension  Assessment and Plan of Treatment: You have a history of elevated blood pressure and you should continue your blood pressure medications as prescribed.  --Continue Metoprolol Succinate 50mg daily, Imdur 60mg daily in AM and Imdur 30mg daily PM     PRINCIPAL DISCHARGE DIAGNOSIS  Diagnosis: CAD (coronary artery disease)  Assessment and Plan of Treatment: PRINCIPAL DISCHARGE DIAGNOSIS  Diagnosis: Coronary artery disease  Assessment and Plan of Treatment: - Please follow up with Dr. Rose in 1-2 weeks  - You underwent a diagnostic cardiac catheterization and was found Non-Obs Disease.    --You do have a history of known Coronary Artery Disease, so please  continue to take aspirin 81mg daily and  Brilinta 90mg twice daily  DO NOT STOP THESE MEDICATIONS AS THEY PREVENT YOUR STENT FROM CLOSING  You underwent a coronary angiogram and should wait 3 days before returning to ordinary activities. Do not drive for 2 days. Consult your doctor before returning to vigorous activity. You may return to work in 3-5 days. The catheter from your groin was removed and you should remove the dressing in 24 hours. You may shower once the dressing is removed, but avoid baths, hot tubs, or swimming for 5 days to prevent infection. If you notice bleeding from the site, hardening and pain at the site, drainage or redness from the site, coolness/paleness of the extremity, swelling, or fever, please call [phone]        SECONDARY DISCHARGE DIAGNOSES  Diagnosis: Seizures  Assessment and Plan of Treatment: You have a history of seizures and had a Seizure post cardiac cath likely stress induced.  Your sodium level was also found very low, likely from your seizure medication. Please continue your Oxcarbazepine 600mg twice daily, and Vimpat 50mg twice daily.   --Please follow up with Epilepsy, Dr. Lesly Walter for further evaluation.  --Also  please continue to follow up for Neurologist. Dr. Barney and Neurosurgery, Dr. Long for continued neurology evaluation.      Diagnosis: Hyperlipidemia  Assessment and Plan of Treatment: Please continue your daily statin to prevent cholesterol build up in your stents.   --Continue Atorvastatin 80mg daily      Diagnosis: Hypertension  Assessment and Plan of Treatment: You have a history of elevated blood pressure and you should continue your blood pressure medications as prescribed.  --Continue Metoprolol Succinate 50mg daily, Imdur 60mg daily in AM and Imdur 30mg daily PM

## 2020-11-14 NOTE — PROGRESS NOTE ADULT - PROBLEM SELECTOR PLAN 3
multiple with no residual deficits.  On Atorvastatin 80mg PO daily.  Hx of severe bilateral moyamoya 's-160's   - Continue home Metoprolol succinate 50mg PO BID, Imdur 30mg PO QD in PM and Imdur 60mg PO in AM

## 2020-11-14 NOTE — DISCHARGE NOTE PROVIDER - NSDCFUSCHEDAPPT_GEN_ALL_CORE_FT
[facility] ; 11/19/2020 ; [facility]  [facility] ; 11/19/2020 ; NPP Neuro [address]  [facility] ; 11/23/2020 ; NPP Cardio Vasc [address]  [facility] ; 11/25/2020 ; [facility] ; NPP Rad MRI  [address] [facility] ; 11/19/2020 ; [facility] PreAdmits  [facility] ; 11/23/2020 ; NP Cardio Vasc [address]  [facility] ; 11/25/2020 ; [facility] ; [facility] ; NPRADHA Rad MRI  [address] [facility] ; 11/23/2020 ; STEPHANIE Cardio Vasc [address]  [facility] ; 11/25/2020 ; [facility] ; 11/[facility] ; STEPHANIE Rad MRI  [address] [facility] ; 11/23/2020 ; STEPHANIE Cardio Vasc [address]  [facility] ; 11/25/2020 ; [facility]  [facility] ; 11/25/2020 ; STEPHANIE Rad MRI  [address] [facility] ; 12/02/2020 ; STEPHANIE Neuro [address]  [facility] ; 12/02/2020 ; NPP Neuro [address]

## 2020-11-14 NOTE — DISCHARGE NOTE PROVIDER - CARE PROVIDER_API CALL
Isi Rose  CARDIOVASCULAR DISEASE  [address], [place] [facility]  Phone: [phone]  Fax: [phone]  Follow Up Time:    Isi Rose  CARDIOVASCULAR DISEASE  [address], [address] [place] [address]  Phone: [phone]  Fax: [phone]  Follow Up Time:     Lesly Walter  EEG/EPILEPSY  [address] [address]  Phone: [phone]  Fax: [phone]  Follow Up Time:     Mike Long  NEUROSURGERY  [address], [address] [place] [address]  Phone: [phone]  Fax: [phone]  Follow Up Time:    Isi Rose  CARDIOVASCULAR DISEASE  [address]  [place] [address]  Phone: [phone]  Fax: [phone]  Follow Up Time:     Mike Long  NEUROSURGERY  [address], [address] [place] [address]  Phone: [phone]  Fax: [phone]  Follow Up Time:     Lesly Walter  EEG/EPILEPSY  [address] [place] [address]  Phone: [phone]  Fax: [phone]  Scheduled Appointment: 12/02/2020 04:40 PM

## 2020-11-14 NOTE — DISCHARGE NOTE PROVIDER - HOSPITAL COURSE
INCOMPLETE  34 y/o female with PMHx of HTN, HLD, CAD vs. coronary vasospasm s/p multiple PCI's (most recent 2016 @ [facility]), L hemispheric TIAs (intermittent speech difficulties), severe B/L moyamoya s/p left STA-MCA bypass in 2017 c/b acute intracranial hemorrhage (known to Dr. Long) who presented to the [facility] ED on 11/13 sent by Dr. Rose for evaluation of SSCP, relieved by SL Nitro and a recent abnormal CCTA. Pt was scheduled for cardiac cath as ambulatory for Thursday 11/19/20 however her pain has worsened. Pt was admitted to [address] [place] [address] for further management of unstable angina and plan for cardiac cath with Dr. Barrientos on Monday 11/16/20. Troponin negative x 3, ECG NSR @ 76bpm, no STT changes. CXR with no acute pathology.   CCTA 11/11/20 revealed hypoattenuation in the proximal portion, cannot rule out in stent restenosis, patent stent in D1 and LM, Remaining coronary arteries appear normal. ECHO 11/14/20 revealed normal left and RV size and systolic function, No significant valvular disease, Small anterior pericardial effusion without tamponade.  S/p cardiac catheterization 11/16/20 resulting in.......    Today pt was seen and examined at bedside, denies any complaints of chest pain, dizziness, SOB, palpitations, pain, LE edema, fever and/or chills. Right radial/groin access site soft, no bleeding or swelling at site, radial pulse 2+, DP/PT pulses at baseline. No events on tele overnight, VSS, Labs unremarkable/stable, Physical exam WNL. Pt was seen and examined by cardiology attending as well and is deemed stable for discharge per  .   Pt is to continue Brilinta 60mg PO BID, Atorvastatin 80mg PO QD, Toprol XL 50mg PO QD, Ranexa 1000mg PO BID, Imdur 30mg PO QHS, Imdur 60mg PO in AM. Pt is to follow up with cardiologist Dr. Rose in 1-2 weeks for post discharge check-up. Pt instructed to return to ED/seek immediate medical attention if symptoms of chest pain, SOB, LOC, bleeding from the access site. Pt agrees with the discharge plan, verbalizes understanding of the information given. All medications explained risks/side effects and e-prescribed to patient preferred pharmacy.         [FreeTextEntry3] : All medical record entries made by the Scribe were at my, Dr. Askew's direction and personally dictated by me on 03/08/2019  I have reviewed the chart and agree that the record accurately reflects my personal performance of the history, physical exam, assessment and plan. I have also personally directed, reviewed, and agreed with the chart.   34 y/o female with PMHx of HTN, HLD, CAD vs. coronary vasospasm s/p multiple PCI's (most recent 2016 @ [facility]), L hemispheric TIAs (intermittent speech difficulties), severe B/L moyamoya s/p left STA-MCA bypass in 2017 c/b acute intracranial hemorrhage (known to Dr. Long) who presented to the [facility] ED on 11/13 sent by Dr. Rose for evaluation of SSCP, relieved by SL Nitro and a recent abnormal CCTA. Pt was scheduled for cardiac cath as ambulatory for Thursday 11/19/20 however her pain has worsened. Pt was admitted to [address] [place] [address] for further management of unstable angina and plan for cardiac cath with Dr. Barrientos on Monday 11/16/20. Troponin negative x 3, ECG NSR @ 76bpm, no STT changes. CXR with no acute pathology.   CCTA 11/11/20 revealed hypoattenuation in the proximal portion, cannot rule out in stent restenosis, patent stent in D1 and LM, Remaining coronary arteries appear normal. ECHO 11/14/20 revealed normal left and RV size and systolic function, No significant valvular disease, Small anterior pericardial effusion without tamponade.  S/p cardiac catheterization 11/16/20 resulting in.......    Today pt was seen and examined at bedside, denies any complaints of chest pain, dizziness, SOB, palpitations, pain, LE edema, fever and/or chills. Right radial/groin access site soft, no bleeding or swelling at site, radial pulse 2+, DP/PT pulses at baseline. No events on tele overnight, VSS, Labs unremarkable/stable, Physical exam WNL. Pt was seen and examined by cardiology attending as well and is deemed stable for discharge per  .   Pt is to continue Brilinta 60mg PO BID, Atorvastatin 80mg PO QD, Toprol XL 50mg PO QD, Ranexa 1000mg PO BID, Imdur 30mg PO QHS, Imdur 60mg PO in AM. Pt is to follow up with cardiologist Dr. Rose in 1-2 weeks for post discharge check-up. Pt instructed to return to ED/seek immediate medical attention if symptoms of chest pain, SOB, LOC, bleeding from the access site. Pt agrees with the discharge plan, verbalizes understanding of the information given. All medications explained risks/side effects and e-prescribed to patient preferred pharmacy.           36 y/o female with PMHx of HTN, HLD, CAD vs. coronary vasospasm s/p multiple PCI's (most recent 2016 @ [facility]), L hemispheric TIAs (intermittent speech difficulties), severe B/L moyamoya s/p left STA-MCA bypass in 2017 c/b acute intracranial hemorrhage (known to Dr. Long) who presented to the [facility] ED on 11/13 sent by Dr. Rose for evaluation of SSCP, relieved by SL Nitro and a recent abnormal CCTA. Pt was scheduled for cardiac cath as ambulatory for Thursday 11/19/20 however her pain has worsened. Pt was admitted to [address] [place] [address] for further management of unstable angina and plan for cardiac cath with Dr. Barrientos on Monday 11/16/20. Troponin negative x 3, ECG NSR @ 76bpm, no STT changes. CXR with no acute pathology.   CCTA 11/11/20 revealed hypoattenuation in the proximal portion, cannot rule out in stent restenosis, patent stent in D1 and LM, Remaining coronary arteries appear normal. ECHO 11/14/20 revealed normal left and RV size and systolic function, No significant valvular disease, Small anterior pericardial effusion without tamponade.  Pt is s/p Cardiac Cath 11/16/2020: LM patent stent. LAD patent stent. D1 patent stent. LCx mild diffuse (small). RCA normal. EF 65%.. EDP 4mmHg. No AS/MR. R groin AS, no complications.       Hospital complicated by  seizure post cath 11/16; stat CT head prelim read revealed no intercranial hemorrhage/transcortical infarct..  Pt currently arousable; AO X 3.    Epilepsy team contacted, aware of CT head findings and 24 hr EEG, placed.  Neurology DR. Barney team consulted; evaluated pt at bedside, thinks Sz is triggered by cath.  Of note:  per Dr. Walter, pt had similar  stress induced seizure event on her prior cath.   Oxycarbenzepine  levels done is <2.0.  Per Neuro, increased Oxycarbenzepine from 600mg to 900mg daily.  EEG reveals:       Today pt was seen and examined at bedside, denies any complaints of chest pain, dizziness, SOB, palpitations, pain, LE edema, fever and/or chills. Right groin access site soft, no bleeding or swelling at site, radial pulse 2+, DP/PT pulses at baseline. No events on tele overnight, VSS, Labs notable for hyponatremia 126 Neuro aware, nothing to do prior to discharge), otherwise labs WNL.  Physical exam WNL.  Pt was seen and examined by cardiology attending as well and is deemed stable for discharge per . Pt is to continue Brilinta 60mg PO BID, Atorvastatin 80mg PO QD, Toprol XL 50mg PO QD, Ranexa 1000mg PO BID, Imdur 30mg PO QHS, Imdur 60mg PO in AM.   Pt is to follow up with cardiologist Dr. Rose in 1-2 weeks for post discharge check-up. Pt instructed to return to ED/seek immediate medical attention if symptoms of chest pain, SOB, LOC, bleeding from the access site. Pt agrees with the discharge plan, verbalizes understanding of the information given. All medications explained risks/side effects and e-prescribed to patient preferred pharmacy.           34 y/o female with PMHx of HTN, HLD, CAD vs. coronary vasospasm s/p multiple PCI's (most recent 2016 @ [facility]), L hemispheric TIAs (intermittent speech difficulties), severe B/L moyamoya s/p left STA-MCA bypass in 2017 c/b acute intracranial hemorrhage (known to Dr. Long) who presented to the [facility] ED on 11/13 sent by Dr. Rose for evaluation of SSCP, relieved by SL Nitro and a recent abnormal CCTA. Pt was scheduled for cardiac cath as ambulatory for Thursday 11/19/20 however her pain has worsened. Pt was admitted to [address] [place] [address] for further management of unstable angina and plan for cardiac cath with Dr. Barrientos on Monday 11/16/20. Troponin negative x 3, ECG NSR @ 76bpm, no STT changes. CXR with no acute pathology.   CCTA 11/11/20 revealed hypoattenuation in the proximal portion, cannot rule out in stent restenosis, patent stent in D1 and LM, Remaining coronary arteries appear normal. ECHO 11/14/20 revealed normal left and RV size and systolic function, No significant valvular disease, Small anterior pericardial effusion without tamponade.  Pt is s/p Cardiac Cath 11/16/2020: LM patent stent. LAD patent stent. D1 patent stent. LCx mild diffuse (small). RCA normal. EF 65%.. EDP 4mmHg. No AS/MR. R groin AS, no complications.       Hospital complicated by  seizure post cath 11/16; stat CT head revealed no intercranial hemorrhage/transcortical infarct..  Pt noted arousable shortly thereafter; AO X 3.   Epilepsy team contacted, made aware of CT head findings and 24 hr EEG, placed.  Neurology DR. Barney team consulted; evaluated pt at bedside, thinks Sz is triggered by cath.  Of note:  per Dr. Walter, pt had similar  stress induced seizure event on her prior cath.   Oxycarbenzepine levels done is <2.0.  Per Neuro, increased Oxycarbenzepine from 600mg to 900mg daily.  EEG reveals:     Today pt was seen and examined at bedside, denies any complaints of chest pain, dizziness, SOB, palpitations, pain, LE edema, fever and/or chills. Right groin access site soft, no bleeding or swelling at site, radial pulse 2+, DP/PT pulses at baseline. No events on tele overnight, VSS, Labs notable for hyponatremia 126 Neuro aware, nothing to do prior to discharge), otherwise labs WNL.  Physical exam WNL.  Pt was seen and examined by cardiology attending as well and is deemed stable for discharge per Dr. Weinstein with follow up with cardiologist Dr. Rose in 1-2 weeks for post discharge check-up.    Pt is also follow up with Epilepsy, Dr. Aguilar Carbajal, Neurosurgery Dr. Long, and Neurologist Dr. Barney.   Pt is to continue Brilinta 60mg PO BID, Atorvastatin 80mg PO QD, Toprol XL 50mg PO QD, Ranexa 1000mg PO BID, Imdur 60mg PO in AM.  Imdur 30mg PO QHS,    Pt instructed to return to ED/seek immediate medical attention if symptoms of chest pain, SOB, LOC, bleeding from the access site or Seizures occurs. Pt agrees with the discharge plan, verbalizes understanding of the information given. All medications explained risks/side effects and e-prescribed to patient preferred pharmacy.           36 y/o female with PMHx of HTN, HLD, CAD vs. coronary vasospasm s/p multiple PCI's (most recent 2016 @ [facility]), L hemispheric TIAs (intermittent speech difficulties), severe B/L moyamoya s/p left STA-MCA bypass in 2017 c/b acute intracranial hemorrhage (known to Dr. Long) who presented to the [facility] ED on 11/13 sent by Dr. Rose for evaluation of SSCP, relieved by SL Nitro and a recent abnormal CCTA. Pt was scheduled for cardiac cath as ambulatory for Thursday 11/19/20 however her pain has worsened. Pt was admitted to [address] [place] [address] for further management of unstable angina and plan for cardiac cath with Dr. Barrientos on Monday 11/16/20. Troponin negative x 3, ECG NSR @ 76bpm, no STT changes. CXR with no acute pathology.   CCTA 11/11/20 revealed hypoattenuation in the proximal portion, cannot rule out in stent restenosis, patent stent in D1 and LM, Remaining coronary arteries appear normal. ECHO 11/14/20 revealed normal left and RV size and systolic function, No significant valvular disease, Small anterior pericardial effusion without tamponade.  Pt is s/p Cardiac Cath 11/16/2020: LM patent stent. LAD patent stent. D1 patent stent. LCx mild diffuse (small). RCA normal. EF 65%.. EDP 4mmHg. No AS/MR. R groin AS, no complications.       Hospital complicated by  seizure post cath 11/16; stat CT head revealed no intercranial hemorrhage/transcortical infarct..  Pt noted arousable shortly thereafter; AO X 3.   Epilepsy team contacted, made aware of CT head findings and 24 hr EEG, placed.  Neurology DR. Barney team consulted; evaluated pt at bedside, thinks Sz is triggered by cath.  Of note:  per Dr. Walter, pt had similar  stress induced seizure event on her prior cath.     Per Neuro, increased Oxycarbenzepine from 600mg to 900mg daily.  EEG reveals:     Today pt was seen and examined at bedside, denies any complaints of chest pain, dizziness, SOB, palpitations, pain, LE edema, fever and/or chills. Right groin access site soft, no bleeding or swelling at site, radial pulse 2+, DP/PT pulses at baseline. No events on tele overnight, VSS, Labs notable for hyponatremia 126 Neuro aware, nothing to do prior to discharge), otherwise labs WNL.  Physical exam WNL.  Pt was seen and examined by cardiology attending as well and is deemed stable for discharge per Dr. Weinstein with follow up with cardiologist Dr. Rose in 1-2 weeks for post discharge check-up.    Pt is also follow up with Epilepsy, Dr. Aguilar Carbajal, Neurosurgery Dr. Long, and Neurologist Dr. Barney.  Pt is to continue Brilinta 60mg PO BID, Atorvastatin 80mg PO QD, Toprol XL 50mg PO QD, Ranexa 1000mg PO BID, Imdur 60mg PO in AM.  Imdur 30mg PO QHS,    Pt instructed to return to ED/seek immediate medical attention if symptoms of chest pain, SOB, LOC, bleeding from the access site or Seizures occurs.     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 The plan was to further adjust medications while monitoring repeat sodium levels and video EEG. However, patient adamantly refused further workup including further blood draws, EEG monitoring, and any additional time in the hospital. It was explained to patient that leaving early would put her at risk of repeat seizures, aspiration, brain injury, and death. She verbalized understanding of these risks through the  and insisted on leaving the hospital for follow up with Dr. Walter. She understands that we cannot further adjust and dose her oxcarbazepine without obtaining the drug level. She states she will follow up with Dr. Walter for further dosing. She verbalized understanding that she could return to the hospital to complete her evaluation and treatment at any time. Patient signed the against medical advice form and left the hospital.   34 y/o female with PMHx of HTN, HLD, CAD vs. coronary vasospasm s/p multiple PCI's (most recent 2016 @ [facility]), L hemispheric TIAs (intermittent speech difficulties), severe B/L moyamoya s/p left STA-MCA bypass in 2017 c/b acute intracranial hemorrhage (known to Dr. Long) who presented to the [facility] ED on 11/13 sent by Dr. Rose for evaluation of SSCP, relieved by SL Nitro and a recent abnormal CCTA. Pt was scheduled for cardiac cath as ambulatory for Thursday 11/19/20 however her pain has worsened. Pt was admitted to [address] [place] [address] for further management of unstable angina and plan for cardiac cath with Dr. Barrientos on Monday 11/16/20. Troponin negative x 3, ECG NSR @ 76bpm, no STT changes. CXR with no acute pathology.   CCTA 11/11/20 revealed hypoattenuation in the proximal portion, cannot rule out in stent restenosis, patent stent in D1 and LM, Remaining coronary arteries appear normal. ECHO 11/14/20 revealed normal left and RV size and systolic function, No significant valvular disease, Small anterior pericardial effusion without tamponade.  Pt is s/p Cardiac Cath 11/16/2020: LM patent stent. LAD patent stent. D1 patent stent. LCx mild diffuse (small). RCA normal. EF 65%.. EDP 4mmHg. No AS/MR. R groin AS, no complications.       Hospital complicated by  seizure post cath 11/16; stat CT head revealed no intercranial hemorrhage/transcortical infarct..  Pt noted arousable shortly thereafter; AO X 3.   Epilepsy team contacted, made aware of CT head findings and 24 hr EEG, placed.  Neurology DR. Barney team consulted; evaluated pt at bedside, thinks Sz is triggered by cath.  Of note:  per Dr. Walter, pt had similar  stress induced seizure event on her prior cath.     Per Neuro, increased Oxycarbenzepine from 600mg to 900mg daily.  EEG reveals:     Today pt was seen and examined at bedside, denies any complaints of chest pain, dizziness, SOB, palpitations, pain, LE edema, fever and/or chills. Right groin access site soft, no bleeding or swelling at site, radial pulse 2+, DP/PT pulses at baseline. No events on tele overnight, VSS, Labs notable for hyponatremia 126 Neuro aware, nothing to do prior to discharge), otherwise labs WNL.  Physical exam WNL.  Pt was seen and examined by cardiology attending as well and is deemed stable for discharge per Dr. Weinstein with follow up with cardiologist Dr. Rose in 1-2 weeks for post discharge check-up.    Pt is also follow up with Epilepsy, Dr. Aguilar Carbajal, Neurosurgery Dr. Long, and Neurologist Dr. Barney.  Pt is to continue Brilinta 60mg PO BID, Atorvastatin 80mg PO QD, Toprol XL 50mg PO QD, Ranexa 1000mg PO BID, Imdur 60mg PO in AM.  Imdur 30mg PO QHS,    Pt instructed to return to ED/seek immediate medical attention if symptoms of chest pain, SOB, LOC, bleeding from the access site or Seizures occurs.     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   34 y/o female with PMHx of HTN, HLD, CAD vs. coronary vasospasm s/p multiple PCI's (most recent 2016 @ [facility]), L hemispheric TIAs (intermittent speech difficulties), severe B/L moyamoya s/p left STA-MCA bypass in 2017 c/b acute intracranial hemorrhage (known to Dr. Long) who presented to the [facility] ED on 11/13 sent by Dr. Rose for evaluation of SSCP, relieved by SL Nitro and a recent abnormal CCTA. Pt was scheduled for cardiac cath as ambulatory for Thursday 11/19/20 however her pain has worsened. Pt was admitted to [address] [place] [address] for further management of unstable angina and plan for cardiac cath with Dr. Barrientos on Monday 11/16/20. Troponin negative x 3, ECG NSR @ 76bpm, no STT changes. CXR with no acute pathology.   CCTA 11/11/20 revealed hypoattenuation in the proximal portion, cannot rule out in stent restenosis, patent stent in D1 and LM, Remaining coronary arteries appear normal. ECHO 11/14/20 revealed normal left and RV size and systolic function, No significant valvular disease, Small anterior pericardial effusion without tamponade.  Pt is s/p Cardiac Cath 11/16/2020: LM patent stent. LAD patent stent. D1 patent stent. LCx mild diffuse (small). RCA normal. EF 65%.. EDP 4mmHg. No AS/MR. R groin AS, no complications.       Hospital complicated by  seizure post cath 11/16; stat CT head revealed no intercranial hemorrhage/transcortical infarct..  Pt noted arousable shortly thereafter; AO X 3.   Epilepsy team contacted, made aware of CT head findings and 24 hr EEG, placed.  Neurology DR. Barney team consulted; patient found to be severely hyponatremic- evaluated pt at bedside, thinks Sz is triggered by cath.  Of note:  per Dr. Walter, pt had similar  stress induced seizure event on her prior cath.     Per Neuro, increased Oxycarbenzepine from 600mg to 900mg daily.     Today pt was seen and examined at bedside, denies any complaints of chest pain, dizziness, SOB, palpitations, pain, LE edema, fever and/or chills. Right groin access site soft, no bleeding or swelling at site, radial pulse 2+, DP/PT pulses at baseline. No events on tele overnight, VSS, Labs notable for hyponatremia 126 Neuro aware, nothing to do prior to discharge), otherwise labs WNL.  Physical exam WNL.  Pt was seen and examined by cardiology attending as well and is deemed stable for discharge per Dr. Weinstein with follow up with cardiologist Dr. Rose in 1-2 weeks for post discharge check-up.    Pt is also follow up with Epilepsy, Dr. Aguilar Carbajal, Neurosurgery Dr. Long, and Neurologist Dr. Barney.  Pt is to continue Brilinta 60mg PO BID, Atorvastatin 80mg PO QD, Toprol XL 50mg PO QD, Ranexa 1000mg PO BID, Imdur 60mg PO in AM.  Imdur 30mg PO QHS,    Pt instructed to return to ED/seek immediate medical attention if symptoms of chest pain, SOB, LOC, bleeding from the access site or Seizures occurs.     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 #Discharge: do not delete    34 y/o female with PMHx of HTN, HLD, CAD vs. coronary vasospasm s/p multiple PCI's (most recent 2016 @ [facility]), L hemispheric TIAs (intermittent speech difficulties), severe B/L moyamoya s/p left STA-MCA bypass in 2017 c/b acute intracranial hemorrhage (known to Dr. Long) who presented to the [facility] ED on 11/13 sent by Dr. Rose for evaluation of SSCP, relieved by SL Nitro and a recent abnormal CCTA. Pt was scheduled for cardiac cath as ambulatory for Thursday 11/19/20 however her pain has worsened. Pt was admitted to [address] [place] [address] for further management of unstable angina and plan for cardiac cath with Dr. Barrientos on Monday 11/16/20. Troponin negative x 3, ECG NSR @ 76bpm, no STT changes. CXR with no acute pathology.   CCTA 11/11/20 revealed hypoattenuation in the proximal portion, cannot rule out in stent restenosis, patent stent in D1 and LM, Remaining coronary arteries appear normal. ECHO 11/14/20 revealed normal left and RV size and systolic function, No significant valvular disease, Small anterior pericardial effusion without tamponade.  Pt is s/p Cardiac Cath 11/16/2020: LM patent stent. LAD patent stent. D1 patent stent. LCx mild diffuse (small). RCA normal. EF 65%.. EDP 4mmHg. No AS/MR. R groin AS, no complications.       Pt was seen and examined by cardiology attending as well and is deemed stable for discharge per Dr. Weinstein with follow up with cardiologist Dr. Rose in 1-2 weeks for post discharge check-up.    Pt is also follow up with Epilepsy, Dr. Aguilar Carbajal, Neurosurgery Dr. Long, and Neurologist Dr. Barney.  Pt is to continue Brilinta 60mg PO BID, Atorvastatin 80mg PO QD, Toprol XL 50mg PO QD, Ranexa 1000mg PO BID, Imdur 60mg PO in AM.  Imdur 30mg PO QHS,  Pt instructed to return to ED/seek immediate medical attention if symptoms of chest pain, SOB, LOC, bleeding from the access site or Seizures occurs.     Hospital complicated by  seizure post cath 11/16; stat CT head revealed no intercranial hemorrhage/transcortical infarct..  Pt noted arousable shortly thereafter; AO X 3.   Epilepsy team contacted, made aware of CT head findings and 24 hr EEG, placed.  Neurology DR. 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 Of note:  per Dr. Walter, pt had similar  stress induced seizure event on her prior cath. Oxycarbenzepine dose adjusted, decreased due to SIADH and hyponatremia. Patient monitored on EEG overnight. Patient stable, hyponatremia corrected and is ready for discharge with change in antiepileptic dosing per epilepsy team.     New medications: Trileptal 600mg BID       Labs to be followed outpatient:       Exam to be followed outpatient:          #Discharge: do not delete    34 y/o female with PMHx of HTN, HLD, CAD vs. coronary vasospasm s/p multiple PCI's (most recent 2016 @ [facility]), L hemispheric TIAs (intermittent speech difficulties), severe B/L moyamoya s/p left STA-MCA bypass in 2017 c/b acute intracranial hemorrhage (known to Dr. Long) who presented to the [facility] ED on 11/13 sent by Dr. Rose for evaluation of SSCP, relieved by SL Nitro and a recent abnormal CCTA. Pt was scheduled for cardiac cath as ambulatory for Thursday 11/19/20 however her pain has worsened. Pt was admitted to [address] [place] [address] for further management of unstable angina and plan for cardiac cath with Dr. Barrientos on Monday 11/16/20. Troponin negative x 3, ECG NSR @ 76bpm, no STT changes. CXR with no acute pathology.   CCTA 11/11/20 revealed hypoattenuation in the proximal portion, cannot rule out in stent restenosis, patent stent in D1 and LM, Remaining coronary arteries appear normal. ECHO 11/14/20 revealed normal left and RV size and systolic function, No significant valvular disease, Small anterior pericardial effusion without tamponade.  Pt is s/p Cardiac Cath 11/16/2020: LM patent stent. LAD patent stent. D1 patent stent. LCx mild diffuse (small). RCA normal. EF 65%.. EDP 4mmHg. No AS/MR. R groin AS, no complications.       Pt was seen and examined by cardiology attending as well and is deemed stable for discharge per Dr. Weinstein with follow up with cardiologist Dr. Rose in 1-2 weeks for post discharge check-up.    Pt is also follow up with Epilepsy, Dr. Aguilar Carbajal, Neurosurgery Dr. Long, and Neurologist Dr. Barney.  Pt is to continue Brilinta 60mg PO BID, Atorvastatin 80mg PO QD, Toprol XL 50mg PO QD, Ranexa 1000mg PO BID, Imdur 60mg PO in AM.  Imdur 30mg PO QHS,  Pt instructed to return to ED/seek immediate medical attention if symptoms of chest pain, SOB, LOC, bleeding from the access site or Seizures occurs.     Hospital complicated by  seizure post cath 11/16; stat CT head revealed no intercranial hemorrhage/transcortical infarct..  Pt noted arousable shortly thereafter; AO X 3.   Epilepsy team contacted, made aware of CT head findings and 24 hr EEG, placed.  Neurology DR. 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 Of note:  per Dr. Walter, pt had similar  stress induced seizure event on her prior cath. Oxycarbenzepine dose adjusted, decreased due to SIADH and hyponatremia. Patient monitored on EEG overnight. Patient stable, hyponatremia corrected and is ready for discharge with change in antiepileptic dosing per epilepsy team.     New medications: Trileptal 600mg BID, Vimpat 50mg BID       Labs to be followed outpatient: BMP      Exam to be followed outpatient: Neurology, Cardiology

## 2020-11-14 NOTE — DISCHARGE NOTE PROVIDER - NSDCMRMEDTOKEN_GEN_ALL_CORE_FT
acetaminophen-oxycodone 325 mg-5 mg oral tablet: 1 tab(s) orally every 4 hours, As needed,Pain (7 - 10) MDD:3 grams tylenol  atorvastatin 80 mg oral tablet: 1 tab(s) orally once a day (at bedtime)  Brilinta (ticagrelor) 60 mg oral tablet: 60 milligram(s) orally 2 times a day  dexamethasone 2 mg oral tablet: 2mg PO q8h x 3 doses, then 2mg PO q12h x 2 doses, then 2mg PO x 1 dose, then stop  docusate sodium 100 mg oral capsule: 1 cap(s) orally 3 times a day  fludrocortisone 0.1 mg oral tablet: 1 tab(s) orally once a day  isosorbide mononitrate 30 mg oral tablet, extended release: 1 tab(s) orally once a day  Macrobid 100 mg oral capsule: 1 cap(s) orally 2 times a day   nitroglycerin 0.4 mg sublingual tablet: 1 tab(s) sublingual every 5 minutes, As Needed - for chest pain Maximum Up To Three Doses    Dispense: 1 vial  OXcarbazepine 600 mg oral tablet: 1 tab(s) orally 2 times a day  pantoprazole 40 mg oral delayed release tablet: 1 tab(s) orally once a day (before a meal)  Ranexa 1000 mg oral tablet, extended release: 1 tab(s) orally 2 times a day   acetaminophen-oxycodone 325 mg-5 mg oral tablet: 1 tab(s) orally every 4 hours, As needed,Pain (7 - 10) MDD:3 grams tylenol  atorvastatin 80 mg oral tablet: 1 tab(s) orally once a day (at bedtime)  Brilinta (ticagrelor) 60 mg oral tablet: 60 milligram(s) orally 2 times a day  fludrocortisone 0.1 mg oral tablet: 1 tab(s) orally once a day  isosorbide mononitrate 30 mg oral tablet, extended release: 1 tab(s) orally once a day  nitroglycerin 0.4 mg sublingual tablet: 1 tab(s) sublingual every 5 minutes, As Needed - for chest pain Maximum Up To Three Doses    Dispense: 1 vial  OXcarbazepine 600 mg oral tablet: 1 tab(s) orally 2 times a day  pantoprazole 40 mg oral delayed release tablet: 1 tab(s) orally once a day (before a meal)  Ranexa 1000 mg oral tablet, extended release: 1 tab(s) orally 2 times a day   acetaminophen-oxycodone 325 mg-5 mg oral tablet: 1 tab(s) orally every 4 hours, As needed,Pain (7 - 10) MDD:3 grams tylenol  atorvastatin 80 mg oral tablet: 1 tab(s) orally once a day (at bedtime)  Brilinta (ticagrelor) 60 mg oral tablet: 60 milligram(s) orally 2 times a day  fludrocortisone 0.1 mg oral tablet: 1 tab(s) orally once a day  isosorbide mononitrate 30 mg oral tablet, extended release: 1 tab(s) orally once a day  lacosamide 50 mg oral tablet: 1 tab(s) orally 2 times a day  nitroglycerin 0.4 mg sublingual tablet: 1 tab(s) sublingual every 5 minutes, As Needed - for chest pain Maximum Up To Three Doses    Dispense: 1 vial  OXcarbazepine 600 mg oral tablet: 1 tab(s) orally 2 times a day  pantoprazole 40 mg oral delayed release tablet: 1 tab(s) orally once a day (before a meal)  Ranexa 1000 mg oral tablet, extended release: 1 tab(s) orally 2 times a day

## 2020-11-14 NOTE — PROGRESS NOTE ADULT - PROBLEM SELECTOR PLAN 5
F/U lipid panel and LFT's   Continue with Atorvastatin 80mg PO daily Continue Oxcarbazepine 600mg PO bid    - DVT PPX: SCD's   - Dispo: Pending cardiac cath on Monday 11/16/20 with Dr. Barrientos  Case discussed with Dr. Esparza

## 2020-11-14 NOTE — DISCHARGE NOTE PROVIDER - INSTRUCTIONS
A Mediterranean Diet is recommended! Some suggestions include continue incorporating 2 or more servings per day of vegetables, fruits, and whole grains. Increase intake of fish and legumes/beans to 2 or more servings per week. Aim to increase intake of healthy fats, such as olive oil and avocados, and have a handful of nuts/seeds most days. Reduce red/processed meat consumption to 2 or fewer times per week.

## 2020-11-14 NOTE — PROGRESS NOTE ADULT - PROBLEM SELECTOR PLAN 1
Telemetry, Troponin neg X1; trend Troponin, ECG, Hx of Cardiac Cath with PCI's most recent 8/18/2016 @[facility] with Dr. JERI Barrientos, PTCA pCX; LM patent stent; pLAD patent stent; D1 patent stent, LCx Ostial 80% ddecrete lesion RCA angiographically normal.  Pt. was on Cath Schedue 11/19/20 with Dr. JERI Barrientos for UA and abnormal CCTA revealing possible restenosis of one of stent.  Cath will need to be moved up.  Please contact Dr. JERI Barrientos tomorrow  Pt. on Brilinta 60mg PO bid Pt presented to the ED by recommendation of Dr. Doe with reports of worsening SSCP 8/10, relieved by SL Nitro at home (reports she has been taking it very often for the past several weeks)  - Hx of Cardiac Cath with PCI's most recent 8/18/2016 @[facility] with Dr. JERI Barrientos, PTCA pCX; LM patent stent; pLAD patent stent; D1 patent stent, LCx Ostial 80% discrete lesion RCA angiographically normal.    - Pt. was on Cath schedule 11/19/20 with Dr. JERI Barrientos for UA and abnormal CCTA revealing possible restenosis of pLAD stent. Pt now added to the schedule for cath on Monday 11/16, Dr. Barrientos made aware, pt consented.   - Continue home Brilinta 60mg PO BID, Metoprolol succinate 50mg PO QD, Ranexa 1000mg PO BID, Imdur Pt presented to the ED by recommendation of Dr. Rose with reports of worsening SSCP 8/10, relieved by SL Nitro at home (reports she has been taking it very often for the past several weeks); continues to report 2/10 SSCP, VSS.   - Trop negative x 3  - ECG NSR @ 76bpm, no STT changes  - Pt w/ h/o CAD, most recent cardiac cath 8/18/2016 @[facility] with Dr. JERI Barrientos, PTCA pCX; LM patent stent; pLAD patent stent; D1 patent stent, LCx Ostial 80% discrete lesion RCA angiographically normal.    - Pt. was on Cath schedule 11/19/20 with Dr. JERI Barrientos for UA and abnormal CCTA revealing possible restenosis of pLAD stent. Pt now added to the schedule for cath on Monday 11/16, Dr. Barrientos made aware, pt consented.   - ECHO 11/14/20 revealed normal left and RV size and systolic function, No significant valvular disease, Small anterior pericardial effusion without tamponade.  - Continue home Brilinta 60mg PO BID, Metoprolol succinate 50mg PO QD, Ranexa 1000mg PO BID, Imdur 30mg PO in PM, 60mg PO in AM, and Atorvastatin 80mg PO QD

## 2020-11-15 ENCOUNTER — FORM ENCOUNTER (OUTPATIENT)
Age: 35
End: 2020-11-15

## 2020-11-15 LAB
ANION GAP SERPL CALC-SCNC: 12 MMOL/L — SIGNIFICANT CHANGE UP (ref 5–17)
BUN SERPL-MCNC: 11 MG/DL — SIGNIFICANT CHANGE UP (ref 7–23)
CALCIUM SERPL-MCNC: 8.8 MG/DL — SIGNIFICANT CHANGE UP (ref 8.4–10.5)
CHLORIDE SERPL-SCNC: 99 MMOL/L — SIGNIFICANT CHANGE UP (ref 96–108)
CO2 SERPL-SCNC: 22 MMOL/L — SIGNIFICANT CHANGE UP (ref 22–31)
CREAT SERPL-MCNC: 0.7 MG/DL — SIGNIFICANT CHANGE UP (ref 0.5–1.3)
GLUCOSE SERPL-MCNC: 104 MG/DL — HIGH (ref 70–99)
HCT VFR BLD CALC: 44.7 % — SIGNIFICANT CHANGE UP (ref 34.5–45)
HGB BLD-MCNC: 14.5 G/DL — SIGNIFICANT CHANGE UP (ref 11.5–15.5)
MAGNESIUM SERPL-MCNC: 1.9 MG/DL — SIGNIFICANT CHANGE UP (ref 1.6–2.6)
MCHC RBC-ENTMCNC: 28.8 PG — SIGNIFICANT CHANGE UP (ref 27–34)
MCHC RBC-ENTMCNC: 32.4 GM/DL — SIGNIFICANT CHANGE UP (ref 32–36)
MCV RBC AUTO: 88.9 FL — SIGNIFICANT CHANGE UP (ref 80–100)
NRBC # BLD: 0 /100 WBCS — SIGNIFICANT CHANGE UP (ref 0–0)
PLATELET # BLD AUTO: 242 K/UL — SIGNIFICANT CHANGE UP (ref 150–400)
POTASSIUM SERPL-MCNC: 4 MMOL/L — SIGNIFICANT CHANGE UP (ref 3.5–5.3)
POTASSIUM SERPL-SCNC: 4 MMOL/L — SIGNIFICANT CHANGE UP (ref 3.5–5.3)
RBC # BLD: 5.03 M/UL — SIGNIFICANT CHANGE UP (ref 3.8–5.2)
RBC # FLD: 12.5 % — SIGNIFICANT CHANGE UP (ref 10.3–14.5)
SODIUM SERPL-SCNC: 133 MMOL/L — LOW (ref 135–145)
WBC # BLD: 7.19 K/UL — SIGNIFICANT CHANGE UP (ref 3.8–10.5)
WBC # FLD AUTO: 7.19 K/UL — SIGNIFICANT CHANGE UP (ref 3.8–10.5)

## 2020-11-15 PROCEDURE — 99232 SBSQ HOSP IP/OBS MODERATE 35: CPT

## 2020-11-15 RX ADMIN — RANOLAZINE 1000 MILLIGRAM(S): 500 TABLET, FILM COATED, EXTENDED RELEASE ORAL at 06:39

## 2020-11-15 RX ADMIN — TICAGRELOR 60 MILLIGRAM(S): 90 TABLET ORAL at 17:11

## 2020-11-15 RX ADMIN — RANOLAZINE 1000 MILLIGRAM(S): 500 TABLET, FILM COATED, EXTENDED RELEASE ORAL at 17:11

## 2020-11-15 RX ADMIN — Medication 1 MILLIGRAM(S): at 12:36

## 2020-11-15 RX ADMIN — OXCARBAZEPINE 600 MILLIGRAM(S): 300 TABLET, FILM COATED ORAL at 06:40

## 2020-11-15 RX ADMIN — ISOSORBIDE MONONITRATE 30 MILLIGRAM(S): 60 TABLET, EXTENDED RELEASE ORAL at 21:54

## 2020-11-15 RX ADMIN — OXCARBAZEPINE 600 MILLIGRAM(S): 300 TABLET, FILM COATED ORAL at 17:11

## 2020-11-15 RX ADMIN — ISOSORBIDE MONONITRATE 60 MILLIGRAM(S): 60 TABLET, EXTENDED RELEASE ORAL at 06:39

## 2020-11-15 RX ADMIN — Medication 50 MILLIGRAM(S): at 06:40

## 2020-11-15 RX ADMIN — Medication 50 MILLIGRAM(S): at 17:11

## 2020-11-15 RX ADMIN — ATORVASTATIN CALCIUM 80 MILLIGRAM(S): 80 TABLET, FILM COATED ORAL at 21:54

## 2020-11-15 RX ADMIN — TICAGRELOR 60 MILLIGRAM(S): 90 TABLET ORAL at 06:39

## 2020-11-15 NOTE — PROGRESS NOTE ADULT - PROBLEM SELECTOR PLAN 3
SBP 130s-150s  - Continue home Metoprolol succinate 50mg PO BID, Imdur 30mg PO QD in PM and Imdur 60mg PO in AM

## 2020-11-15 NOTE — PROGRESS NOTE ADULT - PROBLEM SELECTOR PLAN 1
Pt presented to the ED by recommendation of Dr. Rose with reports of worsening SSCP 8/10, relieved by SL Nitro at home (reports she has been taking it very often for the past several weeks); currently chest pain free, VSS.   - Trop negative x 3  - ECG NSR @ 76bpm, no STT changes  - Pt w/ h/o CAD, most recent cardiac cath 8/18/2016 @[facility] with Dr. JERI Barrientos, PTCA pCX; LM patent stent; pLAD patent stent; D1 patent stent, LCx Ostial 80% discrete lesion RCA angiographically normal.    - Pt. was on Cath schedule 11/19/20 with Dr. JERI Barrientos for UA and abnormal CCTA revealing possible restenosis of pLAD stent. Pt now added to the schedule for cath on Monday 11/16, Dr. Barrientos made aware, pt consented.   - ECHO 11/14/20 revealed normal left and RV size and systolic function, No significant valvular disease, Small anterior pericardial effusion without tamponade.  - Continue home Brilinta 60mg PO BID, Metoprolol succinate 50mg PO QD, Ranexa 1000mg PO BID, Imdur 30mg PO in PM, 60mg PO in AM, and Atorvastatin 80mg PO QD

## 2020-11-15 NOTE — PROGRESS NOTE ADULT - PROBLEM SELECTOR PLAN 2
Pt w/ h/o multiple TIA's with no residual deficits as well as h/o severe bilateral moyamoya; left STA-MCA bypass with 2 revisions with Dr. Long in 2017 c/b acute intracranial hemorrhage  - Continue Atorvastatin 80mg PO daily

## 2020-11-15 NOTE — PROGRESS NOTE ADULT - PROBLEM SELECTOR PLAN 5
Continue Oxcarbazepine 600mg PO bid    - DVT PPX: SCD's   - Dispo: Pending cardiac cath on Monday 11/16/20 with Dr. Barrientos  Case discussed with Dr. Esparza

## 2020-11-15 NOTE — PROGRESS NOTE ADULT - SUBJECTIVE AND OBJECTIVE BOX
Interventional Cardiology PA Adult Progress Note    CC: unstable angina  Subjective Assessment:    ROS negative except as per subjective HPI.   	  MEDICATIONS:  isosorbide   mononitrate ER Tablet (IMDUR) 30 milliGRAM(s) Oral <User Schedule>  isosorbide   mononitrate ER Tablet (IMDUR) 60 milliGRAM(s) Oral <User Schedule>  metoprolol succinate ER 50 milliGRAM(s) Oral two times a day  nitroglycerin     SubLingual 0.4 milliGRAM(s) SubLingual every 5 minutes PRN  ranolazine 1000 milliGRAM(s) Oral two times a day        OXcarbazepine 600 milliGRAM(s) Oral two times a day      atorvastatin 80 milliGRAM(s) Oral at bedtime    folic acid 1 milliGRAM(s) Oral daily  influenza   Vaccine 0.5 milliLiter(s) IntraMuscular once  ticagrelor 60 milliGRAM(s) Oral two times a day      	    [PHYSICAL EXAM:  TELEMETRY:  T(C): 36.3 (11-15-20 @ 04:35), Max: 36.7 (11-14-20 @ 13:39)  HR: 73 (11-15-20 @ 08:28) (69 - 90)  BP: 135/78 (11-15-20 @ 08:28) (124/69 - 164/95)  RR: 17 (11-15-20 @ 08:28) (16 - 18)  SpO2: 96% (11-15-20 @ 08:28) (96% - 98%)  Wt(kg): --  I&O's Summary    14 Nov 2020 07:01  -  15 Nov 2020 07:00  --------------------------------------------------------  IN: 240 mL / OUT: 0 mL / NET: 240 mL        Owen:  Central/PICC/Mid Line:                                               	    ECG:  	  RADIOLOGY:   DIAGNOSTIC TESTING:  [ ] Echocardiogram:  [ ]  Catheterization:  [ ] Stress Test:    [ ] SHANIKA  OTHER: 	    LABS:	 	  CARDIAC MARKERS:                                  14.5   7.19  )-----------( 242      ( 15 Nov 2020 06:20 )             44.7     11-15    133<L>  |  99  |  11  ----------------------------<  104<H>  4.0   |  22  |  0.70    Ca    8.8      15 Nov 2020 06:20  Mg     1.9     11-15    TPro  6.8  /  Alb  4.1  /  TBili  0.3  /  DBili  <0.2  /  AST  16  /  ALT  15  /  AlkPhos  51  11-14    proBNP:   Lipid Profile:   HgA1c:   TSH:   PT/INR - ( 13 Nov 2020 20:05 )   PT: 12.6 sec;   INR: 1.05          PTT - ( 14 Nov 2020 05:55 )  PTT:33.1 sec    ASSESSMENT/PLAN: 	        DVT ppx:  Dispo:     Interventional Cardiology PA Adult Progress Note    CC: unstable angina  Subjective Assessment: Pt seen and examined at bedside; currently denies any chest pain, SOB, dizziness, palpitations.     ROS negative except as per subjective HPI.   	  MEDICATIONS:  isosorbide   mononitrate ER Tablet (IMDUR) 30 milliGRAM(s) Oral <User Schedule>  isosorbide   mononitrate ER Tablet (IMDUR) 60 milliGRAM(s) Oral <User Schedule>  metoprolol succinate ER 50 milliGRAM(s) Oral two times a day  nitroglycerin     SubLingual 0.4 milliGRAM(s) SubLingual every 5 minutes PRN  ranolazine 1000 milliGRAM(s) Oral two times a day        OXcarbazepine 600 milliGRAM(s) Oral two times a day      atorvastatin 80 milliGRAM(s) Oral at bedtime    folic acid 1 milliGRAM(s) Oral daily  influenza   Vaccine 0.5 milliLiter(s) IntraMuscular once  ticagrelor 60 milliGRAM(s) Oral two times a day      	    [PHYSICAL EXAM:  TELEMETRY:  T(C): 36.3 (11-15-20 @ 04:35), Max: 36.7 (11-14-20 @ 13:39)  HR: 73 (11-15-20 @ 08:28) (69 - 90)  BP: 135/78 (11-15-20 @ 08:28) (124/69 - 164/95)  RR: 17 (11-15-20 @ 08:28) (16 - 18)  SpO2: 96% (11-15-20 @ 08:28) (96% - 98%)  Wt(kg): --  I&O's Summary    14 Nov 2020 07:01  -  15 Nov 2020 07:00  --------------------------------------------------------  IN: 240 mL / OUT: 0 mL / NET: 240 mL        Owen:  Central/PICC/Mid Line:                                           PHYSICAL EXAM:  GEN: NAD  HEENT: No JVD  PULM:  CTA B/L, no WRR  CARD:  RRR, S1 and S2, no murmur  ABD: +BS, NT, soft/ND	  EXT: No Edema B/L LE  NEURO: A+Ox3, no focal deficits  PSYCH: Mood Appropriate    	    ECG:  	  RADIOLOGY:   DIAGNOSTIC TESTING:  [ ] Echocardiogram:  [ ]  Catheterization:  [ ] Stress Test:    [ ] SHANIKA  OTHER: 	    LABS:	 	  CARDIAC MARKERS:                                  14.5   7.19  )-----------( 242      ( 15 Nov 2020 06:20 )             44.7     11-15    133<L>  |  99  |  11  ----------------------------<  104<H>  4.0   |  22  |  0.70    Ca    8.8      15 Nov 2020 06:20  Mg     1.9     11-15    TPro  6.8  /  Alb  4.1  /  TBili  0.3  /  DBili  <0.2  /  AST  16  /  ALT  15  /  AlkPhos  51  11-14    proBNP:   Lipid Profile:   HgA1c:   TSH:   PT/INR - ( 13 Nov 2020 20:05 )   PT: 12.6 sec;   INR: 1.05          PTT - ( 14 Nov 2020 05:55 )  PTT:33.1 sec    ASSESSMENT/PLAN: 	        DVT ppx:  Dispo:

## 2020-11-15 NOTE — PROGRESS NOTE ADULT - ASSESSMENT
36 y/o Urdu speaking female with PMHx of HTN, HLD, CAD vs. coronary vasospasm s/p multiple PCI's (most recent 2016 @ [facility]), L hemispheric TIAs (intermittent speech difficulties), severe B/L moyamoya s/p left STA-MCA bypass in 2017 c/b acute intracranial hemorrhage (known to Dr. Long) who presented to the [facility] ED on 11/13 sent by Dr. Rose for evaluation of SSCP, relieved by SL Nitro and a recent abnormal CCTA. Pt was scheduled for cardiac cath as ambulatory for Thursday 11/19/20 however her pain worsened. Pt is now admitted to [address] [place] [address] for further management of unstable angina and plan for cardiac cath with Dr. Barrientos on Monday 11/16/20.

## 2020-11-16 ENCOUNTER — FORM ENCOUNTER (OUTPATIENT)
Age: 35
End: 2020-11-16

## 2020-11-16 LAB
ANION GAP SERPL CALC-SCNC: 11 MMOL/L — SIGNIFICANT CHANGE UP (ref 5–17)
ANION GAP SERPL CALC-SCNC: 13 MMOL/L — SIGNIFICANT CHANGE UP (ref 5–17)
APTT BLD: 38.7 SEC — HIGH (ref 27.5–35.5)
BLD GP AB SCN SERPL QL: NEGATIVE — SIGNIFICANT CHANGE UP
BUN SERPL-MCNC: 11 MG/DL — SIGNIFICANT CHANGE UP (ref 7–23)
BUN SERPL-MCNC: 15 MG/DL — SIGNIFICANT CHANGE UP (ref 7–23)
CALCIUM SERPL-MCNC: 9 MG/DL — SIGNIFICANT CHANGE UP (ref 8.4–10.5)
CALCIUM SERPL-MCNC: 9.2 MG/DL — SIGNIFICANT CHANGE UP (ref 8.4–10.5)
CHLORIDE SERPL-SCNC: 94 MMOL/L — LOW (ref 96–108)
CHLORIDE SERPL-SCNC: 94 MMOL/L — LOW (ref 96–108)
CO2 SERPL-SCNC: 21 MMOL/L — LOW (ref 22–31)
CO2 SERPL-SCNC: 22 MMOL/L — SIGNIFICANT CHANGE UP (ref 22–31)
CREAT SERPL-MCNC: 0.69 MG/DL — SIGNIFICANT CHANGE UP (ref 0.5–1.3)
CREAT SERPL-MCNC: 0.75 MG/DL — SIGNIFICANT CHANGE UP (ref 0.5–1.3)
GLUCOSE BLDC GLUCOMTR-MCNC: 97 MG/DL — SIGNIFICANT CHANGE UP (ref 70–99)
GLUCOSE SERPL-MCNC: 95 MG/DL — SIGNIFICANT CHANGE UP (ref 70–99)
GLUCOSE SERPL-MCNC: 97 MG/DL — SIGNIFICANT CHANGE UP (ref 70–99)
HCG SERPL-ACNC: <0 MIU/ML — SIGNIFICANT CHANGE UP
HCT VFR BLD CALC: 43 % — SIGNIFICANT CHANGE UP (ref 34.5–45)
HCT VFR BLD CALC: 45.7 % — HIGH (ref 34.5–45)
HGB BLD-MCNC: 14.3 G/DL — SIGNIFICANT CHANGE UP (ref 11.5–15.5)
HGB BLD-MCNC: 14.9 G/DL — SIGNIFICANT CHANGE UP (ref 11.5–15.5)
INR BLD: 1.07 — SIGNIFICANT CHANGE UP (ref 0.88–1.16)
LACTATE SERPL-SCNC: 1.4 MMOL/L — SIGNIFICANT CHANGE UP (ref 0.5–2)
MAGNESIUM SERPL-MCNC: 1.8 MG/DL — SIGNIFICANT CHANGE UP (ref 1.6–2.6)
MCHC RBC-ENTMCNC: 28.5 PG — SIGNIFICANT CHANGE UP (ref 27–34)
MCHC RBC-ENTMCNC: 28.6 PG — SIGNIFICANT CHANGE UP (ref 27–34)
MCHC RBC-ENTMCNC: 32.6 GM/DL — SIGNIFICANT CHANGE UP (ref 32–36)
MCHC RBC-ENTMCNC: 33.3 GM/DL — SIGNIFICANT CHANGE UP (ref 32–36)
MCV RBC AUTO: 86 FL — SIGNIFICANT CHANGE UP (ref 80–100)
MCV RBC AUTO: 87.5 FL — SIGNIFICANT CHANGE UP (ref 80–100)
NRBC # BLD: 0 /100 WBCS — SIGNIFICANT CHANGE UP (ref 0–0)
NRBC # BLD: 0 /100 WBCS — SIGNIFICANT CHANGE UP (ref 0–0)
PLATELET # BLD AUTO: 243 K/UL — SIGNIFICANT CHANGE UP (ref 150–400)
PLATELET # BLD AUTO: 260 K/UL — SIGNIFICANT CHANGE UP (ref 150–400)
POTASSIUM SERPL-MCNC: 4.2 MMOL/L — SIGNIFICANT CHANGE UP (ref 3.5–5.3)
POTASSIUM SERPL-MCNC: 4.4 MMOL/L — SIGNIFICANT CHANGE UP (ref 3.5–5.3)
POTASSIUM SERPL-SCNC: 4.2 MMOL/L — SIGNIFICANT CHANGE UP (ref 3.5–5.3)
POTASSIUM SERPL-SCNC: 4.4 MMOL/L — SIGNIFICANT CHANGE UP (ref 3.5–5.3)
PROTHROM AB SERPL-ACNC: 12.8 SEC — SIGNIFICANT CHANGE UP (ref 10.6–13.6)
RBC # BLD: 5 M/UL — SIGNIFICANT CHANGE UP (ref 3.8–5.2)
RBC # BLD: 5.22 M/UL — HIGH (ref 3.8–5.2)
RBC # FLD: 11.9 % — SIGNIFICANT CHANGE UP (ref 10.3–14.5)
RBC # FLD: 12.2 % — SIGNIFICANT CHANGE UP (ref 10.3–14.5)
RH IG SCN BLD-IMP: POSITIVE — SIGNIFICANT CHANGE UP
SODIUM SERPL-SCNC: 127 MMOL/L — LOW (ref 135–145)
SODIUM SERPL-SCNC: 128 MMOL/L — LOW (ref 135–145)
WBC # BLD: 6.74 K/UL — SIGNIFICANT CHANGE UP (ref 3.8–10.5)
WBC # BLD: 7.75 K/UL — SIGNIFICANT CHANGE UP (ref 3.8–10.5)
WBC # FLD AUTO: 6.74 K/UL — SIGNIFICANT CHANGE UP (ref 3.8–10.5)
WBC # FLD AUTO: 7.75 K/UL — SIGNIFICANT CHANGE UP (ref 3.8–10.5)

## 2020-11-16 PROCEDURE — 99221 1ST HOSP IP/OBS SF/LOW 40: CPT

## 2020-11-16 PROCEDURE — 99232 SBSQ HOSP IP/OBS MODERATE 35: CPT

## 2020-11-16 PROCEDURE — 70450 CT HEAD/BRAIN W/O DYE: CPT | Mod: 26

## 2020-11-16 PROCEDURE — 93010 ELECTROCARDIOGRAM REPORT: CPT

## 2020-11-16 RX ORDER — ACETAMINOPHEN 500 MG
650 TABLET ORAL ONCE
Refills: 0 | Status: COMPLETED | OUTPATIENT
Start: 2020-11-16 | End: 2020-11-16

## 2020-11-16 RX ORDER — SODIUM CHLORIDE 9 MG/ML
1000 INJECTION INTRAMUSCULAR; INTRAVENOUS; SUBCUTANEOUS
Refills: 0 | Status: DISCONTINUED | OUTPATIENT
Start: 2020-11-16 | End: 2020-11-16

## 2020-11-16 RX ORDER — TICAGRELOR 90 MG/1
120 TABLET ORAL ONCE
Refills: 0 | Status: COMPLETED | OUTPATIENT
Start: 2020-11-16 | End: 2020-11-16

## 2020-11-16 RX ORDER — ASPIRIN/CALCIUM CARB/MAGNESIUM 324 MG
243 TABLET ORAL ONCE
Refills: 0 | Status: DISCONTINUED | OUTPATIENT
Start: 2020-11-16 | End: 2020-11-16

## 2020-11-16 RX ORDER — SODIUM CHLORIDE 9 MG/ML
500 INJECTION INTRAMUSCULAR; INTRAVENOUS; SUBCUTANEOUS
Refills: 0 | Status: DISCONTINUED | OUTPATIENT
Start: 2020-11-16 | End: 2020-11-19

## 2020-11-16 RX ORDER — ASPIRIN/CALCIUM CARB/MAGNESIUM 324 MG
81 TABLET ORAL ONCE
Refills: 0 | Status: COMPLETED | OUTPATIENT
Start: 2020-11-16 | End: 2020-11-16

## 2020-11-16 RX ADMIN — RANOLAZINE 1000 MILLIGRAM(S): 500 TABLET, FILM COATED, EXTENDED RELEASE ORAL at 06:11

## 2020-11-16 RX ADMIN — ISOSORBIDE MONONITRATE 30 MILLIGRAM(S): 60 TABLET, EXTENDED RELEASE ORAL at 21:13

## 2020-11-16 RX ADMIN — TICAGRELOR 120 MILLIGRAM(S): 90 TABLET ORAL at 13:13

## 2020-11-16 RX ADMIN — Medication 1 MILLIGRAM(S): at 11:47

## 2020-11-16 RX ADMIN — TICAGRELOR 60 MILLIGRAM(S): 90 TABLET ORAL at 06:11

## 2020-11-16 RX ADMIN — Medication 650 MILLIGRAM(S): at 21:13

## 2020-11-16 RX ADMIN — Medication 50 MILLIGRAM(S): at 18:59

## 2020-11-16 RX ADMIN — Medication 650 MILLIGRAM(S): at 22:13

## 2020-11-16 RX ADMIN — Medication 50 MILLIGRAM(S): at 06:11

## 2020-11-16 RX ADMIN — Medication 81 MILLIGRAM(S): at 13:13

## 2020-11-16 RX ADMIN — OXCARBAZEPINE 600 MILLIGRAM(S): 300 TABLET, FILM COATED ORAL at 18:59

## 2020-11-16 RX ADMIN — ATORVASTATIN CALCIUM 80 MILLIGRAM(S): 80 TABLET, FILM COATED ORAL at 21:13

## 2020-11-16 RX ADMIN — RANOLAZINE 1000 MILLIGRAM(S): 500 TABLET, FILM COATED, EXTENDED RELEASE ORAL at 18:59

## 2020-11-16 RX ADMIN — ISOSORBIDE MONONITRATE 60 MILLIGRAM(S): 60 TABLET, EXTENDED RELEASE ORAL at 06:11

## 2020-11-16 RX ADMIN — SODIUM CHLORIDE 75 MILLILITER(S): 9 INJECTION INTRAMUSCULAR; INTRAVENOUS; SUBCUTANEOUS at 11:50

## 2020-11-16 RX ADMIN — OXCARBAZEPINE 600 MILLIGRAM(S): 300 TABLET, FILM COATED ORAL at 06:11

## 2020-11-16 NOTE — CHART NOTE - NSCHARTNOTEFT_GEN_A_CORE
PA called by bedside around 4 pm; pt just recently transported back to bed; cath lab nurse at bedside; Pt s/p diagnostic cardiac angiogram with patent stents. At bedside; pt found  unresponsive; not following commands; does not know where she is or what her name is; all of a sudden pt foaming on mouth with b/l jerking movements which resolved in a few seconds on its own; Rapid response called; stat labs done were stable; stat EKG done unchanged from previous meds; CT head prelim done revealed no intercranial hemorrhage/transcortical infarct; Epilepsy Dr. Harrington contacted, aware of CT head findings and pt to get 24 hr EEG, if pt with another episode; can receive lorazapam 2 mg IV x 1; load with Keppra 1000 IV X 1 and c/w Keppra 1000 mg PO BID; will f/u carbamazepine level in am Neurology DR. Strong (pt known to him as o/p)  team consulted , FAIZAN Kurtz ar bedside who thinks seizure activity most likely triggered by cardiac cath; Pt currently arousable; AO X 3.   Neurosurgery Dr. Long's team also aware of event.   Dr. Weinstein aware of recent event. PA called by bedside around 4 pm; pt just recently transported back to bed; cath lab nurse at bedside; Pt s/p diagnostic cardiac angiogram with patent stents. At bedside; pt found  unresponsive; not following commands; does not know where she is or what her name is; all of a sudden pt foaming on mouth with b/l jerking movements which resolved in a few seconds on its own; Rapid response called; stat labs done were stable; stat EKG done unchanged from previous meds; CT head prelim done revealed no intercranial hemorrhage/transcortical infarct; Epilepsy Dr. Harrington contacted, aware of CT head findings and pt to get 24 hr EEG, if pt with another episode; can receive lorazapam 2 mg IV x 1; load with Keppra 1000 IV X 1 and c/w Keppra 1000 mg PO BID; will f/u carbamazepine level in am Neurology DR. Strong (pt known to him as o/p)  team consulted , FAIZAN Kurtz ar bedside who thinks seizure activity most likely triggered by cardiac cath; Pt currently drousy but arousable; AO X 3.   Neurosurgery Dr. Long's team also aware of event.   Dr. Weinstein aware of recent event.

## 2020-11-16 NOTE — PROGRESS NOTE ADULT - PROBLEM SELECTOR PLAN 2
Pt w/ h/o multiple TIA's with no residual deficits as well as h/o severe bilateral moyamoya; left STA-MCA bypass with 2 revisions with Dr. Long in 2017 c/b acute intracranial hemorrhage  - Continue Atorvastatin 80mg PO daily  - cleared by neurosurgery Dr. Long for DAPT load and increasing home brillinta 60 BID to 90 BID

## 2020-11-16 NOTE — CONSULT NOTE ADULT - SUBJECTIVE AND OBJECTIVE BOX
Neurosurgery Consult Note:    HISTORY OF PRESENT ILLNESS:   34 y/o female, Russian speaking with PMHx of HTN, HLD, CAD vs. coronary vasospasm multiple PCI's and stent most recent Cardiac Cath on 16 @[facility] PTCA of prox Cx with LM patent stent; LAD patent stent; D1 patent stent, LCx Ostial 80%, L hemispheric TIAs (pt. denies residual deficits) with vascular imaging (MRA, DSA) that revealed bilateral ICA occlusion with multiple sources of collateral flow to bilateral hemispheres (mostly PCA on R side, PCA and transdural MMA collateral on L side, consistent with severe bilateral moyamoya, hx of Left Crani @[facility] 17 with Dr. Long,for Left STA-MCA bypass direct and indirect (2 revisions of anastomosis and repositioning graft) complicated by acute intracranial hemorrhage in superior left frontal lobe and recent abnormal CCTA 2/2 to intermittent , non exertion chest pain was on schedule for cardiac catheterization next week presents to [facility] ER this evening, 20, by her Cardiologist, Dr. Rose, for Unstable Angina.      In speaking with patient, she states she was on the schedule for cardiac cath on 20, Thursday with Dr. BRENDA Barrientos,      for abnormal CCTA and intermittent, non exertional, non radiating chest pain with no associated symptoms.  However,  pt. reports chest pain, described as  "heavy" pressure, 8/10 is worse today and could not wait for cardiac cath next week.  She called her Cardiologist, Dr. Rose who told her to take a SL Nitro which did not improve her chest pain.  Pt. denies sick contact, fever, HA, SOB, dizziness, diaphoresis, palpitations, abdominal discomfort, n/v and bowel changes.    In ER ECG reveals NSR@76bpm with non specific ST-T wave changes, Troponin neg X1, , Mg 1.8 (supplemented Mag Ox 400mg PO X1), K+ hemolysis (redraw) Na 133.  CXR reveals no acute pathology seen, follow up official report.  COVID 19 non dected    In light of patient's cardiac hx, UA and abnormal CCTA pt. is now admitted to [facility] for recommended Left Heart Catheterization with possible intervention early next week, TTE and medical management.    Neurosurgery consulted for hx of cerebral bypass and AC use in the setting of possible new cardiac stent placement today in cath lab      PAST MEDICAL & SURGICAL HISTORY:  TIA (transient ischemic attack)  x 4    Moyamoya disease    Dyslipidemia    CAD (coronary artery disease)  Hx Cardiac stent which was re occluded then open Hx of cardiac catheterization PTCA X3 on Brilinta Aspirin    Essential hypertension  Hypertension    Epilepsy  Epilepsy    Coronary stent occlusion    Other postprocedural status  S/P       FAMILY HISTORY:  No pertinent family history  No significant family history        SOCIAL HISTORY:  Tobacco Use:  EtOH use:   Substance:    Allergies    No Known Allergies    Intolerances        MEDICATIONS:  Antibiotics:    Neuro:  OXcarbazepine 600 milliGRAM(s) Oral two times a day    Anticoagulation:  ticagrelor 60 milliGRAM(s) Oral two times a day    OTHER:  atorvastatin 80 milliGRAM(s) Oral at bedtime  influenza   Vaccine 0.5 milliLiter(s) IntraMuscular once  isosorbide   mononitrate ER Tablet (IMDUR) 30 milliGRAM(s) Oral <User Schedule>  isosorbide   mononitrate ER Tablet (IMDUR) 60 milliGRAM(s) Oral <User Schedule>  metoprolol succinate ER 50 milliGRAM(s) Oral two times a day  nitroglycerin     SubLingual 0.4 milliGRAM(s) SubLingual every 5 minutes PRN  ranolazine 1000 milliGRAM(s) Oral two times a day    IVF:  folic acid 1 milliGRAM(s) Oral daily      Vital Signs Last 24 Hrs  T(C): 36.4 (2020 09:03), Max: 36.7 (2020 06:42)  T(F): 97.6 (2020 09:03), Max: 98.1 (2020 06:42)  HR: 74 (2020 08:32) (67 - 82)  BP: 159/96 (2020 08:32) (141/87 - 160/95)  BP(mean): 108 (2020 05:30) (105 - 123)  RR: 18 (2020 08:32) (16 - 18)  SpO2: 97% (2020 08:32) (96% - 99%)    PHYSICAL EXAM:  Gen: laying in hospital bed, NAD  Neuro: AA+Ox3, OE spont, FC  CN II -XII : PERRL, EOMI  Motor: MAEx4, 5/5 strength throughout, no drift  SILT in UE and LE b/l  Pulm: CTAB  Cardiovascular: RRR  Gastrointestinal: abd soft, NT/ND    LABS:                        14.3   6.74  )-----------( 243      ( 2020 06:13 )             43.0         127<L>  |  94<L>  |  15  ----------------------------<  97  4.2   |  22  |  0.75    Ca    9.2      2020 06:13  Mg     1.8           CULTURES:      RADIOLOGY & ADDITIONAL STUDIES:    Assessment:   34 y/o Russian speaking female with PMHx of HTN, HLD, CAD vs. coronary vasospasm s/p multiple PCI's (most recent  @ [facility]), L hemispheric TIAs (intermittent speech difficulties), severe B/L moyamoya s/p left STA-MCA bypass in  (known to Dr. Long), last cerebral angio in  showing thrombosed L STA-MCA bypass with paten indirect bypass, who presented to the [facility] ED on  sent by Dr. Rose for evaluation of SSCP, relieved by SL Nitro and a recent abnormal CCTA. Pt was scheduled for cardiac cath as ambulatory for 20 however her pain worsened. Pt is now admitted to [address] [place] [address] for further management of unstable angina and plan for cardiac cath with Dr. Barrientos on 20.     Plan:  - okay to load with ASA/Brilinta in cardiac cath  - okay to start ASA increase Brilinta after stent placement  - can follow up with Dr. Long in the office outpatient, please call to make an apt: [phone]  - please page neurosurgery with any questions or concerns: [phone]    d/w Dr. Long

## 2020-11-16 NOTE — PROVIDER CONTACT NOTE (OTHER) - RECOMMENDATIONS
Blood sugar Finger stick 97. Rapid response team at bedside. B/P noted to be 203/107. Pt is not following commands. repeat /101, and patient following commands in Hebrew c/o headache.

## 2020-11-16 NOTE — PROVIDER CONTACT NOTE (OTHER) - SITUATION
Pt is s/p diagnostic cardiac cath and transferred back to  [facility]is. Pt transferred to stretcher from procedure well following commands in Belarusian.

## 2020-11-16 NOTE — PROGRESS NOTE ADULT - SUBJECTIVE AND OBJECTIVE BOX
Interventional Cardiology PA Adult Progress Note    CC: chest pain  Subjective Assessment: Pt seen and examined at bedside today am. Pt comfortable; reports having 2/10 chest pain in am which self resolved; denies any chest pain currently; denies any SOB, dizziness, palpitations; N/V.     ROS neg except as per subjective HPI.    	  MEDICATIONS:  isosorbide   mononitrate ER Tablet (IMDUR) 30 milliGRAM(s) Oral <User Schedule>  isosorbide   mononitrate ER Tablet (IMDUR) 60 milliGRAM(s) Oral <User Schedule>  metoprolol succinate ER 50 milliGRAM(s) Oral two times a day  nitroglycerin     SubLingual 0.4 milliGRAM(s) SubLingual every 5 minutes PRN  ranolazine 1000 milliGRAM(s) Oral two times a day  OXcarbazepine 600 milliGRAM(s) Oral two times a day  atorvastatin 80 milliGRAM(s) Oral at bedtime  folic acid 1 milliGRAM(s) Oral daily  influenza   Vaccine 0.5 milliLiter(s) IntraMuscular once  sodium chloride 0.9%. 1000 milliLiter(s) IV Continuous <Continuous>  ticagrelor 60 milliGRAM(s) Oral two times a day      	    [PHYSICAL EXAM:  TELEMETRY:  T(C): 36.4 (11-16-20 @ 09:03), Max: 36.7 (11-16-20 @ 06:42)  HR: 74 (11-16-20 @ 08:32) (67 - 82)  BP: 159/96 (11-16-20 @ 08:32) (141/87 - 160/95)  RR: 18 (11-16-20 @ 08:32) (16 - 18)  SpO2: 97% (11-16-20 @ 08:32) (96% - 99%)  Wt(kg): --  I&O's Summary    15 Nov 2020 07:01  -  16 Nov 2020 07:00  --------------------------------------------------------  IN: 480 mL / OUT: 0 mL / NET: 480 mL        Owen:  Central/PICC/Mid Line:                                           PHYSICAL EXAM:  GEN: NAD  HEENT: No JVD  PULM:  CTA B/L, no WRR  CARD:  RRR, S1 and S2, no murmur  ABD: +BS, NT, soft/ND	  EXT: No Edema B/L LE  NEURO: A+Ox3, no focal deficits  PSYCH: Mood Appropriate      	    ECG:  	  RADIOLOGY:   DIAGNOSTIC TESTING:  [ ] Echocardiogram:  [ ]  Catheterization:  [ ] Stress Test:    [ ] SHANIKA  OTHER: 	    LABS:	 	  CARDIAC MARKERS:                                  14.3   6.74  )-----------( 243      ( 16 Nov 2020 06:13 )             43.0     11-16    127<L>  |  94<L>  |  15  ----------------------------<  97  4.2   |  22  |  0.75    Ca    9.2      16 Nov 2020 06:13  Mg     1.8     11-16      proBNP:   Lipid Profile:   HgA1c:   TSH:       ASSESSMENT/PLAN: 	        DVT ppx:  Dispo:

## 2020-11-16 NOTE — PROVIDER CONTACT NOTE (OTHER) - ASSESSMENT
Upon arrival to [facility], pt noted to be lethargic in following commands, and stating "I don't know" to all questions asked, such as what is your name, where are you right now, do you have any pain? Then patient noted to stiffen and arch her back, foam at the mouth, and tremble. Rapid response team called at this time. Initial b/P noted to  193/102. Fluids bolus is running.

## 2020-11-16 NOTE — PROGRESS NOTE ADULT - ASSESSMENT
34 y/o Kazakh speaking female with PMHx of HTN, HLD, CAD vs. coronary vasospasm s/p multiple PCI's (most recent 2016 @ [facility]), L hemispheric TIAs (intermittent speech difficulties), severe B/L moyamoya s/p left STA-MCA bypass in 2017 c/b acute intracranial hemorrhage (known to Dr. Long who has cleared pt to load for DAPT) who presented to the [facility] ED on 11/13 sent by Dr. Rose for evaluation of SSCP, relieved by SL Nitro and a recent abnormal CCTA. Pt was scheduled for cardiac cath as ambulatory for Thursday 11/19/20 however her pain worsened. Pt is now admitted to [address] [place] [address] for further management of unstable angina and plan for cardiac cath with Dr. Barrientos on Monday 11/16/20.

## 2020-11-16 NOTE — PROVIDER CONTACT NOTE (CHANGE IN STATUS NOTIFICATION) - RECOMMENDATIONS
FAIZAN Atwood at bedside. Rapid response called. Pt seizure precautions started. Pt brought on monitor for CT of head. EEG in place currently.

## 2020-11-16 NOTE — PROVIDER CONTACT NOTE (CHANGE IN STATUS NOTIFICATION) - ACTION/TREATMENT ORDERED:
FAIZAN Atwood at bedside. Rapid response called. Pt seizure precautions started. Pt brought on monitor for CT of head. EEG in place currently. Will continue to closely monitor Pt and endorse to night shift.

## 2020-11-16 NOTE — PROGRESS NOTE ADULT - PROBLEM SELECTOR PLAN 1
Pt presented to the ED by recommendation of Dr. Rose with reports of worsening SSCP 8/10, relieved by SL Nitro at home (reports she has been taking it very often for the past several weeks); currently chest pain free, VSS.   - Trop negative x 3  - ECG NSR @ 76bpm, no STT changes  - Pt w/ h/o CAD, most recent cardiac cath 8/18/2016 [facility] with Dr. JERI Barrientos, PTCA pCX; LM patent stent; pLAD patent stent; D1 patent stent, LCx Ostial 80% discrete lesion RCA angiographically normal.    - Pt. was on Cath schedule 11/19/20 with Dr. JERI Barrientos for UA and abnormal CCTA revealing possible restenosis of pLAD stent. Pt now added to the schedule for cath on Monday 11/16, Dr. Barrientos made aware, pt consented.   - ECHO 11/14/20 revealed normal left and RV size and systolic function, No significant valvular disease, Small anterior pericardial effusion without tamponade.  - Continue home Brilinta 60mg PO BID, Metoprolol succinate 50mg PO QD, Ranexa 1000mg PO BID, Imdur 30mg PO in PM, 60mg PO in AM, and Atorvastatin 80mg PO QD  - pt given additional brillinta 120 mg PO X 1 11/16; already recived am brilinta 60 mg; will need to increase home brillinta 60 BID to 90 BID if pt receives stent

## 2020-11-16 NOTE — PROVIDER CONTACT NOTE (CHANGE IN STATUS NOTIFICATION) - BACKGROUND
Pt went for diagnotic cath and just returned back to 5 uris. R groin site CDI, no bleeding or hematoma noted

## 2020-11-16 NOTE — CONSULT NOTE ADULT - SUBJECTIVE AND OBJECTIVE BOX
**Neurology CONSULT NOTE**      HPI:   35y Female w/ PMH HTN, HLD, CAD vs. coronary vasospasm s/p multiple PCI's (most recent  @ [facility]), L hemispheric TIAs (intermittent speech difficulties), severe B/L moyamoya s/p left STA-MCA bypass in 2017 c/b acute intracranial hemorrhage (known to Dr. Long). Presented for cardiac cath done today . Neurology consulted as post op pt had a seizure. Seizure was described by the primary team as "jerking of all extremities and foaming at the mouth". Pt taken for CT with no acute events on CT. Later, pt started to wake up but would not talk. On my assessment, pt was able to talk and say her name but otherwise lethargic and did not want to participate in exam.     T(C): 36.2 (20 @ 14:10), Max: 36.7 (20 @ 06:42)  HR: 72 (20 @ 17:09) (67 - 82)  BP: 135/89 (20 @ 17:09) (134/87 - 209/125)  RR: 18 (20 @ 17:09) (16 - 18)  SpO2: 95% (20 @ 17:09) (93% - 99%)    PAST MEDICAL & SURGICAL HISTORY:  TIA (transient ischemic attack)  x 4    Moyamoya disease    Dyslipidemia    CAD (coronary artery disease)  Hx Cardiac stent which was re occluded then open Hx of cardiac catheterization PTCA X3 on Brilinta Aspirin    Essential hypertension  Hypertension    Epilepsy  Epilepsy    Coronary stent occlusion    Other postprocedural status  S/P         FAMILY HISTORY:  No pertinent family history  No significant family history        SOCIAL HISTORY:    ROS:   Neurological: As per HPI      MEDICATIONS  (STANDING):  atorvastatin 80 milliGRAM(s) Oral at bedtime  folic acid 1 milliGRAM(s) Oral daily  influenza   Vaccine 0.5 milliLiter(s) IntraMuscular once  isosorbide   mononitrate ER Tablet (IMDUR) 30 milliGRAM(s) Oral <User Schedule>  isosorbide   mononitrate ER Tablet (IMDUR) 60 milliGRAM(s) Oral <User Schedule>  LORazepam   Injectable 2 milliGRAM(s) IV Push once  metoprolol succinate ER 50 milliGRAM(s) Oral two times a day  OXcarbazepine 600 milliGRAM(s) Oral two times a day  ranolazine 1000 milliGRAM(s) Oral two times a day  sodium chloride 0.9%. 500 milliLiter(s) (75 mL/Hr) IV Continuous <Continuous>    MEDICATIONS  (PRN):  nitroglycerin     SubLingual 0.4 milliGRAM(s) SubLingual every 5 minutes PRN Chest Pain    Allergies    No Known Allergies    Intolerances      Vital Signs Last 24 Hrs  T(C): 36.2 (2020 14:10), Max: 36.7 (2020 06:42)  T(F): 97.2 (2020 14:10), Max: 98.1 (2020 06:42)  HR: 72 (2020 17:09) (67 - 82)  BP: 135/89 (2020 17:09) (134/87 - 209/125)  BP(mean): 108 (2020 05:30) (105 - 123)  RR: 18 (2020 17:09) (16 - 18)  SpO2: 95% (2020 17:09) (93% - 99%)    Physical exam:  Constitutional: No acute distress, speaking only a few words  Eyes: Anicteric sclerae, moist conjunctivae, see below for CNs  Neck: trachea midline, FROM, supple, no thyromegaly or lymphadenopathy  Cardiovascular: Regular rate and rhythm    Neurologic:  -Mental status: Lethargic,, oriented to person, otherwise did not want to answer questions even basic in Lao. Followed some commands after much prompting.  -Cranial nerves:   II: Visual fields are full to confrontation.  III, IV, VI: Extraocular movements are intact without nystagmus  VII: no facial droop  Motor: Normal bulk and tone. Able to lift extremities antigravity but with poor effort      Fingerstick Blood Glucose: CAPILLARY BLOOD GLUCOSE      POCT Blood Glucose.: 97 mg/dL (2020 16:22)    LABS:                        14.9   7.75  )-----------( 260      ( 2020 16:37 )             45.7     11-16    128<L>  |  94<L>  |  11  ----------------------------<  95  4.4   |  21<L>  |  0.69    Ca    9.0      2020 16:37  Mg     1.8     11-16      PT/INR - ( 2020 17:00 )   PT: 12.8 sec;   INR: 1.07          PTT - ( 2020 17:00 )  PTT:38.7 sec          RADIOLOGY & ADDITIONAL STUDIES:    < from: CT Head No Cont (16.20 @ 17:05) >  IMPRESSION:    No acute intracranial hemorrhage, mass effect or CT evidence of recent transcortical infarct.    Similar to prior imaging from 2017, there is chronic infarct change in the left frontal lobe in this patient status post ipsilateral craniotomy for cerebrovascular bypass.    < end of copied text >    -----------------------------------------------------------------------------------------------------------------        Assessment and Plan:  35y Female w/ PMH HTN, HLD, CAD vs. coronary vasospasm s/p multiple PCI's (most recent 2016 @ [facility]), L hemispheric TIAs (intermittent speech difficulties), severe B/L moyamoya s/p left STA-MCA bypass in 2017 c/b acute intracranial hemorrhage (known to Dr. Long). Presented for cardiac cath done today . Neurology consulted as post op pt had a seizure.    -epilepsy team consulted - recommended EEG  -If patient is persistently altered, can give an additional dose of Carbamazepine.  -Epilepsy team to follow up - patient needs to follow up with Dr. Walter as an outpatient  -CT head with no acute events  -Seizure likely triggered by cardiac cath done earlier today

## 2020-11-16 NOTE — PROVIDER CONTACT NOTE (CHANGE IN STATUS NOTIFICATION) - ASSESSMENT
FAIZAN Atwood called. Cath lab RNs at bedside. Pt vital at 16:32 bp 171/90, O2 98 on rm air, HR 72, and afebrile. Pt had seizure like activity including minimal foaming of mouth, back arching.

## 2020-11-17 LAB
ANION GAP SERPL CALC-SCNC: 13 MMOL/L — SIGNIFICANT CHANGE UP (ref 5–17)
BUN SERPL-MCNC: 13 MG/DL — SIGNIFICANT CHANGE UP (ref 7–23)
CALCIUM SERPL-MCNC: 8.8 MG/DL — SIGNIFICANT CHANGE UP (ref 8.4–10.5)
CARBAMAZEPINE SERPL-MCNC: <2 UG/ML — LOW (ref 4–12)
CHLORIDE SERPL-SCNC: 93 MMOL/L — LOW (ref 96–108)
CO2 SERPL-SCNC: 20 MMOL/L — LOW (ref 22–31)
CREAT SERPL-MCNC: 0.68 MG/DL — SIGNIFICANT CHANGE UP (ref 0.5–1.3)
GLUCOSE SERPL-MCNC: 83 MG/DL — SIGNIFICANT CHANGE UP (ref 70–99)
HCT VFR BLD CALC: 42.9 % — SIGNIFICANT CHANGE UP (ref 34.5–45)
HGB BLD-MCNC: 14.3 G/DL — SIGNIFICANT CHANGE UP (ref 11.5–15.5)
MAGNESIUM SERPL-MCNC: 1.8 MG/DL — SIGNIFICANT CHANGE UP (ref 1.6–2.6)
MCHC RBC-ENTMCNC: 28.7 PG — SIGNIFICANT CHANGE UP (ref 27–34)
MCHC RBC-ENTMCNC: 33.3 GM/DL — SIGNIFICANT CHANGE UP (ref 32–36)
MCV RBC AUTO: 86 FL — SIGNIFICANT CHANGE UP (ref 80–100)
NRBC # BLD: 0 /100 WBCS — SIGNIFICANT CHANGE UP (ref 0–0)
PLATELET # BLD AUTO: 243 K/UL — SIGNIFICANT CHANGE UP (ref 150–400)
POTASSIUM SERPL-MCNC: 4.2 MMOL/L — SIGNIFICANT CHANGE UP (ref 3.5–5.3)
POTASSIUM SERPL-SCNC: 4.2 MMOL/L — SIGNIFICANT CHANGE UP (ref 3.5–5.3)
PROLACTIN SERPL-MCNC: 72.5 NG/ML — HIGH (ref 3.4–24.1)
RBC # BLD: 4.99 M/UL — SIGNIFICANT CHANGE UP (ref 3.8–5.2)
RBC # FLD: 11.9 % — SIGNIFICANT CHANGE UP (ref 10.3–14.5)
SODIUM SERPL-SCNC: 126 MMOL/L — LOW (ref 135–145)
WBC # BLD: 6.46 K/UL — SIGNIFICANT CHANGE UP (ref 3.8–10.5)
WBC # FLD AUTO: 6.46 K/UL — SIGNIFICANT CHANGE UP (ref 3.8–10.5)

## 2020-11-17 PROCEDURE — 99232 SBSQ HOSP IP/OBS MODERATE 35: CPT

## 2020-11-17 PROCEDURE — 99223 1ST HOSP IP/OBS HIGH 75: CPT

## 2020-11-17 PROCEDURE — 93010 ELECTROCARDIOGRAM REPORT: CPT

## 2020-11-17 RX ORDER — OXCARBAZEPINE 300 MG/1
900 TABLET, FILM COATED ORAL
Refills: 0 | Status: DISCONTINUED | OUTPATIENT
Start: 2020-11-17 | End: 2020-11-18

## 2020-11-17 RX ADMIN — ISOSORBIDE MONONITRATE 60 MILLIGRAM(S): 60 TABLET, EXTENDED RELEASE ORAL at 06:15

## 2020-11-17 RX ADMIN — RANOLAZINE 1000 MILLIGRAM(S): 500 TABLET, FILM COATED, EXTENDED RELEASE ORAL at 17:36

## 2020-11-17 RX ADMIN — OXCARBAZEPINE 600 MILLIGRAM(S): 300 TABLET, FILM COATED ORAL at 06:15

## 2020-11-17 RX ADMIN — RANOLAZINE 1000 MILLIGRAM(S): 500 TABLET, FILM COATED, EXTENDED RELEASE ORAL at 06:15

## 2020-11-17 RX ADMIN — Medication 1 MILLIGRAM(S): at 11:53

## 2020-11-17 RX ADMIN — ATORVASTATIN CALCIUM 80 MILLIGRAM(S): 80 TABLET, FILM COATED ORAL at 21:59

## 2020-11-17 RX ADMIN — Medication 50 MILLIGRAM(S): at 06:15

## 2020-11-17 RX ADMIN — OXCARBAZEPINE 900 MILLIGRAM(S): 300 TABLET, FILM COATED ORAL at 17:36

## 2020-11-17 RX ADMIN — Medication 50 MILLIGRAM(S): at 17:37

## 2020-11-17 RX ADMIN — ISOSORBIDE MONONITRATE 30 MILLIGRAM(S): 60 TABLET, EXTENDED RELEASE ORAL at 21:59

## 2020-11-17 NOTE — PROGRESS NOTE ADULT - PROBLEM SELECTOR PLAN 5
-H/o chronic seizures at home  -Pt with stress induced seizure post cath 11/16; stat CT head prelim read revealed no intercranial hemorrhage/transcortical infarct. Pt currently arousable; AO X 3.    -Epilepsy/Neuro team contacted, aware of CT head findings and 24 hr EEG, placed (results pending);  EEG removed this evening at pt's request.  -Increased from Oxcarbazepine 600mg BID to 900mg BID today; Oxcarbazepine level done today; results pending (sent out test per lab)  -Per neuro; pt was on Oxcarbazepine 1200mg BID at home.    -F/U Neuro results recs.    -Will likely discharge tomorrow if stable    - DVT PPX: SCD's   - Dispo: Pending clinical progression

## 2020-11-17 NOTE — PROGRESS NOTE ADULT - ASSESSMENT
34 y/o Serbian speaking female with PMHx of HTN, HLD, CAD vs. coronary vasospasm s/p multiple PCI's (most recent 2016 @ [facility]), L hemispheric TIAs (intermittent speech difficulties), severe B/L moyamoya s/p left STA-MCA bypass in 2017 c/b acute intracranial hemorrhage (known to Dr. Long who has cleared pt to load for DAPT) who presented to the [facility] ED on 11/13 sent by Dr. Rose for evaluation of SSCP, relieved by SL Nitro and a recent abnormal CCTA. Pt was scheduled for cardiac cath as ambulatory for Thursday 11/19/20 however her pain worsened. Pt is now admitted to [address] [place] [address] for further management of unstable angina, s/p diagnostic cardiac cath with Dr. Barrientos on 11/16/20.

## 2020-11-17 NOTE — PROGRESS NOTE ADULT - PROBLEM SELECTOR PLAN 1
Pt presented to the ED by recommendation of Dr. Rose with reports of worsening SSCP 8/10, relieved by SL Nitro at home (reports she has been taking it very often for the past several weeks); currently chest pain free, VSS.   - Trop negative x 3  - ECG NSR @ 76bpm, no STT changes  - Pt w/ h/o CAD, most recent cardiac cath 8/18/2016 @[facility] with Dr. JERI Barrientos, PTCA pCX; LM patent stent; pLAD patent stent; D1 patent stent, LCx Ostial 80% discrete lesion RCA angiographically normal.    - Pt. was on Cath schedule 11/19/20 with Dr. JERI Barrientos for UA and abnormal CCTA revealing possible restenosis of pLAD stent. Pt now added to the schedule for cath on Monday 11/16, Dr. Barrientos made aware, pt consented.   -s/p Diagnostic Cath revealing cath 11/16/2020: LM patent stent. LAD patent stent. D1 patent stent. LCx mild diffuse (small). RCA normal. EF 65%.. EDP 4mmHg. No AS/MR. R groin AS.  - ECHO 11/14/20 revealed normal left and RV size and systolic function, No significant valvular disease, Small anterior pericardial effusion without tamponade.  - Continue home Brilinta 60mg PO BID, Metoprolol succinate 50mg PO QD, Ranexa 1000mg PO BID, Imdur 30mg PO in PM, 60mg PO in AM, and Atorvastatin 80mg PO QD  - Cleared from cardiology standpoint to be discharged home

## 2020-11-17 NOTE — CONSULT NOTE ADULT - ASSESSMENT
35y Female with a PMH of longstanding history of epilepsy on Oxcarb,  CAD s/p multiple PCI's, L hemispheric TIAs, severe B/L moyamoya s/p left STA-MCA bypass in 2017 c/b acute ICU, had an episode of GTC post op after cardiac path. Now has back to her baseline. Overnight EEG showed abundant L temporal slowing, occasional L temporal sharps and semi-rhythmic delta activities, no obvious change compared to previous study.    - Oxcarb level  - Continue home AEDs for now. Oxcarb 1200 mg BID     35y Female with a PMH of longstanding history of epilepsy on Oxcarb,  CAD s/p multiple PCI's, L hemispheric TIAs, severe B/L moyamoya s/p left STA-MCA bypass in 2017 c/b acute ICU, had an episode of GTC post op after cardiac path. Now has back to her baseline. Overnight EEG showed abundant L temporal slowing, occasional L temporal sharps and semi-rhythmic delta activities, no obvious change compared to previous study.    - Obtain Oxcarb level  - Increase Oxcarb to 900mg BID for now (her home dose is 1200 mg BID)   35y Female with a PMH of longstanding history of epilepsy on Oxcarb,  CAD s/p multiple PCI's, L hemispheric TIAs, severe B/L moyamoya s/p left STA-MCA bypass in 2017 c/b acute ICH. She had an episode of GTC post op after cardiac path. Now has back to her baseline. Overnight EEG showed abundant L temporal slowing, occasional L temporal sharps and semi-rhythmic delta activities, no obvious change compared to previous study.    - Obtain Oxcarb level  - Increase Oxcarb to 900mg BID for now (her home dose is 1200 mg BID)   35y Female with a PMH of longstanding history of epilepsy on Oxcarb,  CAD s/p multiple PCI's, L hemispheric TIAs, severe B/L moyamoya s/p left STA-MCA bypass in 2017 c/b acute ICH. She had an episode of GTC post op after cardiac path. Now has back to her baseline. Overnight EEG showed abundant L temporal slowing, occasional L temporal sharps and semi-rhythmic delta activities, no obvious change compared to previous study. Her home dose of Oxcarb has been recently increased to 1200 mg BID due to low blood level however she feels dizzy on higher dose.    - Oxcarb level  - Will decide the dose of Oxcarb after obtaining the level

## 2020-11-17 NOTE — CONSULT NOTE ADULT - SUBJECTIVE AND OBJECTIVE BOX
HPI  35y Female w/ PMH HTN, HLD, CAD vs. coronary vasospasm s/p multiple PCI's (most recent 2016 @ [facility]), L hemispheric TIAs (intermittent speech difficulties), severe B/L moyamoya s/p left STA-MCA bypass in 2017 c/b acute intracranial hemorrhage (known to Dr. Long).   Patient had a witnessed episode of GTC post op after cardiac path on . Primary team described as "jerking of all extremities and foaming at the mouth". Pt taken for CT with no acute events on CT. Later, pt started to wake up but would not talk.    Seizure history: Dr. Walter's patient.  Epilepsy since age of 9. Was on Oxcarb 600 mg BID, recently increase to 1200 mg BID due to low blood level on 10.08.2020.  Prior EEG  AEEG -2020: epileptogenic focus L temporal area (occasional L temp spikes, Continuous L temp slowing)  REEG 10/31/19: epileptogenic focus L temporal area     Prior imaging  MRI Brain Findings: MRA 17- attenuated caliber of L STA w/ no signal identified in distal bypass segment which may be secondary to occlusion.  MRI brain 17-s/p L FT craniotomy for STA bypass with post surgical changes. Stable watershed ischemia w/in centrum semiovale b/l. No acute ischemia.       Review of Systems:  CONSTITUTIONAL:  No weight loss, fever, chills, weakness or fatigue.  HEENT:  Eyes:  No visual loss, blurred vision, double vision or yellow sclerae. Ears, Nose, Throat:  No hearing loss, sneezing, congestion, runny nose or sore throat.  SKIN:  No rash or itching.  CARDIOVASCULAR:  No chest pain, chest pressure or chest discomfort. No palpitations or edema.  RESPIRATORY:  No shortness of breath, cough or sputum.  GASTROINTESTINAL:  No anorexia, nausea, vomiting or diarrhea. No abdominal pain or blood.  GENITOURINARY: No Burning on urination.   NEUROLOGICAL:  see HPI  MUSCULOSKELETAL:  No muscle, back pain, joint pain or stiffness.  HEMATOLOGIC:  No anemia, bleeding or bruising.  LYMPHATICS:  No enlarged nodes. No history of splenectomy.  PSYCHIATRIC:  No history of depression or anxiety.  ENDOCRINOLOGIC:  No reports of sweating, cold or heat intolerance. No polyuria or polydipsia.  ALLERGIES:  No history of asthma, hives, eczema or rhinitis.    PAST MEDICAL & SURGICAL HISTORY:  TIA  Moyamoya disease  Dyslipidemia  CAD (coronary artery disease)  Hx Cardiac stent which was re occluded then open Hx of cardiac catheterization PTCA X3 on Brilinta Aspirin  Essential hypertension  Epilepsy  Other postprocedural status  S/P     FAMILY HISTORY:  No pertinent family history  No significant family history     Allergies  No Known Allergies    MEDICATIONS  (STANDING):  atorvastatin 80 milliGRAM(s) Oral at bedtime  folic acid 1 milliGRAM(s) Oral daily  influenza   Vaccine 0.5 milliLiter(s) IntraMuscular once  isosorbide   mononitrate ER Tablet (IMDUR) 30 milliGRAM(s) Oral <User Schedule>  isosorbide   mononitrate ER Tablet (IMDUR) 60 milliGRAM(s) Oral <User Schedule>  LORazepam   Injectable 2 milliGRAM(s) IV Push once  metoprolol succinate ER 50 milliGRAM(s) Oral two times a day  OXcarbazepine 600 milliGRAM(s) Oral two times a day  ranolazine 1000 milliGRAM(s) Oral two times a day  sodium chloride 0.9%. 500 milliLiter(s) (75 mL/Hr) IV Continuous <Continuous>    MEDICATIONS  (PRN):  nitroglycerin     SubLingual 0.4 milliGRAM(s) SubLingual every 5 minutes PRN Chest Pain    T(C): 35.8 (20 @ 09:30), Max: 36.7 (20 @ 22:40)  HR: 70 (20 @ 08:23) (68 - 82)  BP: 138/81 (20 @ 08:23) (133/81 - 209/125)  RR: 16 (20 @ 08:23) (16 - 18)  SpO2: 98% (20 @ 08:23) (93% - 98%)    General:  Constitutional:  Sitting comfortably in NAD.  Cognitive:  Orientation, language, memory and knowledge screens intact.  Cranial Nerves:  II: Full to confrontation. III/IV/VI: PERRL EOMF No nystagmus  V1V2V3: Symmetric, VII: Face appears symmetric VIII: Normal to screening, IX/X: Palate Elevates Symmetrical  XI: Trapezius Symmetric  XII: Tongue midline  Motor:  Power: 5/5 throughout, tone: normal x 4 limbs, no tremor   Sensation:  Intact to light touch. Intact to pinprick/temperature and vibration.  Coordination/Gait:  Finger-nose-finger intact, normal rapid-alternating movements.  Fine motor normal with normal rapid finger taps and heel-toe tapping   narrow based gait, tandem forward and back ok    Labs  CBC Full  -  ( 2020 06:30 )  WBC Count : 6.46 K/uL  RBC Count : 4.99 M/uL  Hemoglobin : 14.3 g/dL  Hematocrit : 42.9 %  Platelet Count - Automated : 243 K/uL  Mean Cell Volume : 86.0 fl  Mean Cell Hemoglobin : 28.7 pg  Mean Cell Hemoglobin Concentration : 33.3 gm/dL  Auto Neutrophil # : x  Auto Lymphocyte # : x  Auto Monocyte # : x  Auto Eosinophil # : x  Auto Basophil # : x  Auto Neutrophil % : x  Auto Lymphocyte % : x  Auto Monocyte % : x  Auto Eosinophil % : x  Auto Basophil % : x        126<L>  |  93<L>  |  13  ----------------------------<  83  4.2   |  20<L>  |  0.68    Ca    8.8      2020 06:30  Mg     1.8             PT/INR - ( 2020 17:00 )   PT: 12.8 sec;   INR: 1.07          PTT - ( 2020 17:00 )  PTT:38.7 sec  Carbamazepine Level, Serum: <2.0 ug/mL <L> [4.0 - 12.0] ( @ 06:30)      EEG:

## 2020-11-17 NOTE — PROGRESS NOTE ADULT - SUBJECTIVE AND OBJECTIVE BOX
used: Ekaterina ID # [id number]    S: Pt seen and examined bedside, found crying, stating that she want to go "casa" now, appears emotionally distressed.  Pt states that her seizures are not new and the doctors does not understand this.   Patient denies C/P, SOB, N/V, dizziness, palpitations, and diaphoresis.    12 Point ROS otherwise negative except as per HPI/subjective.     O: Vital Signs Last 24 Hrs  T(C): 36.1 (2020 18:19), Max: 36.7 (2020 22:40)  T(F): 97 (2020 18:19), Max: 98.1 (2020 22:40)  HR: 70 (2020 17:32) (68 - 76)  BP: 160/85 (2020 17:32) (133/81 - 191/119)  BP(mean): 104 (2020 06:13) (101 - 110)  RR: 18 (2020 17:32) (16 - 18)  SpO2: 97% (2020 17:32) (95% - 98%)    PHYSICAL EXAM:  GEN: appears very emotional   HEENT: No JVD  PULM:  CTA B/L, no RRW B/L  CARD:  RRR, S1 and S2   ABD: +BS, NT, soft/ND	  EXT: No Edema B/L LE  NEURO: A+Ox3, no focal deficit  PSYCH: Mood Appropriate    LABS:                        14.3   6.46  )-----------( 243      ( 2020 06:30 )             42.9         126<L>  |  93<L>  |  13  ----------------------------<  83  4.2   |  20<L>  |  0.68    Ca    8.8      2020 06:30  Mg     1.8           PT/INR - ( 2020 17:00 )   PT: 12.8 sec;   INR: 1.07          PTT - ( 2020 17:00 )  PTT:38.7 sec  Troponin T, Serum: <0.01 ng/mL (20 @ 05:55)  Troponin T, Serum: <0.01 ng/mL (20 @ 00:00)  Troponin T, Serum: <0.01 ng/mL (20 @ 20:05)       @ 07:01  -   @ 18:48  --------------------------------------------------------  IN: 360 mL / OUT: 0 mL / NET: 360 mL      Daily     Daily Weight in k.9 (2020 06:17)

## 2020-11-17 NOTE — PROGRESS NOTE ADULT - SUBJECTIVE AND OBJECTIVE BOX
NP Note Neuorsurgery    HPI:  A&O X3 Full Code   and  RN Nurse Tiffany  in ER    36 y/o female, Swedish speaking with PMHx of HTN, HLD, CAD vs. coronary vasospasm multiple PCI's and stent most recent Cardiac Cath on 8/18/16 @[facility] PTCA of prox Cx with LM patent stent; LAD patent stent; D1 patent stent, LCx Ostial 80%, L hemispheric TIAs (pt. denies residual deficits) with vascular imaging (MRA, DSA) that revealed bilateral ICA occlusion with multiple sources of collateral flow to bilateral hemispheres (mostly PCA on R side, PCA and transdural MMA collateral on L side, consistent with severe bilateral moyamoya, hx of Left Crani @[facility] 5/31/17 with Dr. Long,for Left STA-MCA bypass direct and indirect (2 revisions of anastomosis and repositioning graft) complicated by acute intracranial hemorrhage in superior left frontal lobe and recent abnormal CCTA 2/2 to intermittent , non exertion chest pain was on schedule for cardiac catheterization next week presents to [facility] ER this evening, 11/13/20, by her Cardiologist, Dr. Rose, for Unstable Angina.      In speaking with patient, she states she was on the schedule for cardiac cath on 11/19/20, Thursday with Dr. BRENDA Barrientos,      for abnormal CCTA and intermittent, non exertional, non radiating chest pain with no associated symptoms.  However,  pt. reports chest pain, described as  "heavy" pressure, 8/10 is worse today and could not wait for cardiac cath next week.  She called her Cardiologist, Dr. Rose who told her to take a SL Nitro which did not improve her chest pain.  Pt. denies sick contact, fever, HA, SOB, dizziness, diaphoresis, palpitations, abdominal discomfort, n/v and bowel changes.    In ER ECG reveals NSR@76bpm with non specific ST-T wave changes, Troponin neg X1, , Mg 1.8 (supplemented Mag Ox 400mg PO X1), K+ hemolysis (redraw) Na 133.  CXR reveals no acute pathology seen, follow up official report.  COVID 19 non dected    In light of patient's cardiac hx, UA and abnormal CCTA pt. is now admitted to [facility] for recommended Left Heart Catheterization with possible intervention early next week, TTE and medical management. (13 Nov 2020 22:32)    OVERNIGHT EVENTS:  Vital Signs Last 24 Hrs  T(C): 36.2 (17 Nov 2020 14:16), Max: 36.7 (16 Nov 2020 22:40)  T(F): 97.1 (17 Nov 2020 14:16), Max: 98.1 (16 Nov 2020 22:40)  HR: 75 (17 Nov 2020 11:51) (68 - 76)  BP: 137/89 (17 Nov 2020 11:51) (133/81 - 191/119)  BP(mean): 104 (17 Nov 2020 06:13) (101 - 110)  RR: 16 (17 Nov 2020 11:51) (16 - 18)  SpO2: 96% (17 Nov 2020 11:51) (95% - 98%)    I&O's Summary    17 Nov 2020 07:01  -  17 Nov 2020 17:20  --------------------------------------------------------  IN: 360 mL / OUT: 0 mL / NET: 360 mL      PHYSICAL EXAM:  Gen: laying in hospital bed, NAD  Neuro: AA+Ox3, OE spont, FC  CN II -XII : PERRL, EOMI  Motor: MAEx4, 5/5 strength throughout, no drift  SILT in UE and LE b/l  Pulm: CTAB  Cardiovascular: RRR  Gastrointestinal: abd soft, NT/ND        DIET:  Regular    LABS:                        14.3   6.46  )-----------( 243      ( 17 Nov 2020 06:30 )             42.9     11-17    126<L>  |  93<L>  |  13  ----------------------------<  83  4.2   |  20<L>  |  0.68    Ca    8.8      17 Nov 2020 06:30  Mg     1.8     11-17      PT/INR - ( 16 Nov 2020 17:00 )   PT: 12.8 sec;   INR: 1.07          PTT - ( 16 Nov 2020 17:00 )  PTT:38.7 sec    CAPILLARY BLOOD GLUCOSE        Drug Levels: [] N/A    CSF Analysis: [] N/A      Allergies    No Known Allergies    Intolerances      MEDICATIONS:  Antibiotics:    Neuro:  LORazepam   Injectable 2 milliGRAM(s) IV Push once  OXcarbazepine 900 milliGRAM(s) Oral two times a day    Anticoagulation:    OTHER:  atorvastatin 80 milliGRAM(s) Oral at bedtime  influenza   Vaccine 0.5 milliLiter(s) IntraMuscular once  isosorbide   mononitrate ER Tablet (IMDUR) 30 milliGRAM(s) Oral <User Schedule>  isosorbide   mononitrate ER Tablet (IMDUR) 60 milliGRAM(s) Oral <User Schedule>  metoprolol succinate ER 50 milliGRAM(s) Oral two times a day  nitroglycerin     SubLingual 0.4 milliGRAM(s) SubLingual every 5 minutes PRN  ranolazine 1000 milliGRAM(s) Oral two times a day    IVF:  folic acid 1 milliGRAM(s) Oral daily  sodium chloride 0.9%. 500 milliLiter(s) IV Continuous <Continuous>    CULTURES:    RADIOLOGY & ADDITIONAL TESTS:        Assessment:   36 y/o Swedish speaking female with PMHx of HTN, HLD, CAD vs. coronary vasospasm s/p multiple PCI's (most recent 2016 @ [facility]), L hemispheric TIAs (intermittent speech difficulties), severe B/L moyamoya s/p left STA-MCA bypass in 2017 (known to Dr. Long), last cerebral angio in 2019 showing thrombosed L STA-MCA bypass with paten indirect bypass, who presented to the [facility] ED on 11/13 sent by Dr. Rose for evaluation of SSCP, relieved by SL Nitro and a recent abnormal CCTA. Pt was scheduled for cardiac cath as ambulatory for Thursday 11/19/20 however her pain worsened. Pt is now admitted to [address] [place] [address] for further management of unstable angina and plan for cardiac cath with Dr. Barrientos on Monday 11/16/20.     PLAN:  NEURO:    Plan:  - okay to load with ASA/Brilinta in cardiac cath  - okay to start ASA increase Brilinta after stent placement  - can follow up with Dr. Long in the office outpatient, please call to make an apt: [phone]  - please page neurosurgery with any questions or concerns: [phone]    d/w Dr. Long    Signing off at this time

## 2020-11-18 ENCOUNTER — TRANSCRIPTION ENCOUNTER (OUTPATIENT)
Age: 35
End: 2020-11-18

## 2020-11-18 DIAGNOSIS — R63.8 OTHER SYMPTOMS AND SIGNS CONCERNING FOOD AND FLUID INTAKE: ICD-10-CM

## 2020-11-18 DIAGNOSIS — E87.1 HYPO-OSMOLALITY AND HYPONATREMIA: ICD-10-CM

## 2020-11-18 LAB
ANION GAP SERPL CALC-SCNC: 10 MMOL/L — SIGNIFICANT CHANGE UP (ref 5–17)
ANION GAP SERPL CALC-SCNC: 9 MMOL/L — SIGNIFICANT CHANGE UP (ref 5–17)
ANION GAP SERPL CALC-SCNC: 9 MMOL/L — SIGNIFICANT CHANGE UP (ref 5–17)
BUN SERPL-MCNC: 10 MG/DL — SIGNIFICANT CHANGE UP (ref 7–23)
BUN SERPL-MCNC: 10 MG/DL — SIGNIFICANT CHANGE UP (ref 7–23)
BUN SERPL-MCNC: 12 MG/DL — SIGNIFICANT CHANGE UP (ref 7–23)
CALCIUM SERPL-MCNC: 8.3 MG/DL — LOW (ref 8.4–10.5)
CALCIUM SERPL-MCNC: 8.7 MG/DL — SIGNIFICANT CHANGE UP (ref 8.4–10.5)
CALCIUM SERPL-MCNC: 8.9 MG/DL — SIGNIFICANT CHANGE UP (ref 8.4–10.5)
CHLORIDE SERPL-SCNC: 90 MMOL/L — LOW (ref 96–108)
CHLORIDE SERPL-SCNC: 92 MMOL/L — LOW (ref 96–108)
CHLORIDE SERPL-SCNC: 96 MMOL/L — SIGNIFICANT CHANGE UP (ref 96–108)
CO2 SERPL-SCNC: 20 MMOL/L — LOW (ref 22–31)
CO2 SERPL-SCNC: 21 MMOL/L — LOW (ref 22–31)
CO2 SERPL-SCNC: 22 MMOL/L — SIGNIFICANT CHANGE UP (ref 22–31)
CREAT ?TM UR-MCNC: 152 MG/DL — SIGNIFICANT CHANGE UP
CREAT SERPL-MCNC: 0.62 MG/DL — SIGNIFICANT CHANGE UP (ref 0.5–1.3)
CREAT SERPL-MCNC: 0.67 MG/DL — SIGNIFICANT CHANGE UP (ref 0.5–1.3)
CREAT SERPL-MCNC: 0.69 MG/DL — SIGNIFICANT CHANGE UP (ref 0.5–1.3)
GLUCOSE SERPL-MCNC: 112 MG/DL — HIGH (ref 70–99)
GLUCOSE SERPL-MCNC: 132 MG/DL — HIGH (ref 70–99)
GLUCOSE SERPL-MCNC: 90 MG/DL — SIGNIFICANT CHANGE UP (ref 70–99)
HCT VFR BLD CALC: 43.1 % — SIGNIFICANT CHANGE UP (ref 34.5–45)
HGB BLD-MCNC: 14.1 G/DL — SIGNIFICANT CHANGE UP (ref 11.5–15.5)
MAGNESIUM SERPL-MCNC: 1.8 MG/DL — SIGNIFICANT CHANGE UP (ref 1.6–2.6)
MCHC RBC-ENTMCNC: 28.5 PG — SIGNIFICANT CHANGE UP (ref 27–34)
MCHC RBC-ENTMCNC: 32.7 GM/DL — SIGNIFICANT CHANGE UP (ref 32–36)
MCV RBC AUTO: 87.1 FL — SIGNIFICANT CHANGE UP (ref 80–100)
NRBC # BLD: 0 /100 WBCS — SIGNIFICANT CHANGE UP (ref 0–0)
OSMOLALITY UR: 913 MOSM/KG — HIGH (ref 300–900)
PLATELET # BLD AUTO: 226 K/UL — SIGNIFICANT CHANGE UP (ref 150–400)
POTASSIUM SERPL-MCNC: 4.1 MMOL/L — SIGNIFICANT CHANGE UP (ref 3.5–5.3)
POTASSIUM SERPL-MCNC: 4.2 MMOL/L — SIGNIFICANT CHANGE UP (ref 3.5–5.3)
POTASSIUM SERPL-MCNC: 4.3 MMOL/L — SIGNIFICANT CHANGE UP (ref 3.5–5.3)
POTASSIUM SERPL-SCNC: 4.1 MMOL/L — SIGNIFICANT CHANGE UP (ref 3.5–5.3)
POTASSIUM SERPL-SCNC: 4.2 MMOL/L — SIGNIFICANT CHANGE UP (ref 3.5–5.3)
POTASSIUM SERPL-SCNC: 4.3 MMOL/L — SIGNIFICANT CHANGE UP (ref 3.5–5.3)
POTASSIUM UR-SCNC: 45 MMOL/L — SIGNIFICANT CHANGE UP
PROT ?TM UR-MCNC: 14 MG/DL — HIGH (ref 0–12)
RBC # BLD: 4.95 M/UL — SIGNIFICANT CHANGE UP (ref 3.8–5.2)
RBC # FLD: 11.9 % — SIGNIFICANT CHANGE UP (ref 10.3–14.5)
SODIUM SERPL-SCNC: 121 MMOL/L — LOW (ref 135–145)
SODIUM SERPL-SCNC: 121 MMOL/L — LOW (ref 135–145)
SODIUM SERPL-SCNC: 127 MMOL/L — LOW (ref 135–145)
SODIUM UR-SCNC: 112 MMOL/L — SIGNIFICANT CHANGE UP
URATE SERPL-MCNC: 2.1 MG/DL — LOW (ref 2.5–7)
UUN UR-MCNC: 1482 MG/DL — SIGNIFICANT CHANGE UP
WBC # BLD: 7.53 K/UL — SIGNIFICANT CHANGE UP (ref 3.8–10.5)
WBC # FLD AUTO: 7.53 K/UL — SIGNIFICANT CHANGE UP (ref 3.8–10.5)

## 2020-11-18 PROCEDURE — 99232 SBSQ HOSP IP/OBS MODERATE 35: CPT

## 2020-11-18 PROCEDURE — 99233 SBSQ HOSP IP/OBS HIGH 50: CPT

## 2020-11-18 PROCEDURE — 76937 US GUIDE VASCULAR ACCESS: CPT | Mod: 26

## 2020-11-18 PROCEDURE — 99223 1ST HOSP IP/OBS HIGH 75: CPT

## 2020-11-18 PROCEDURE — 36000 PLACE NEEDLE IN VEIN: CPT

## 2020-11-18 PROCEDURE — 95720 EEG PHY/QHP EA INCR W/VEEG: CPT

## 2020-11-18 RX ORDER — LACOSAMIDE 50 MG/1
50 TABLET ORAL
Refills: 0 | Status: DISCONTINUED | OUTPATIENT
Start: 2020-11-18 | End: 2020-11-19

## 2020-11-18 RX ORDER — SODIUM CHLORIDE 5 G/100ML
500 INJECTION, SOLUTION INTRAVENOUS
Refills: 0 | Status: DISCONTINUED | OUTPATIENT
Start: 2020-11-18 | End: 2020-11-19

## 2020-11-18 RX ORDER — TOLVAPTAN 15 MG/1
15 TABLET ORAL ONCE
Refills: 0 | Status: COMPLETED | OUTPATIENT
Start: 2020-11-18 | End: 2020-11-18

## 2020-11-18 RX ORDER — MAGNESIUM OXIDE 400 MG ORAL TABLET 241.3 MG
400 TABLET ORAL ONCE
Refills: 0 | Status: COMPLETED | OUTPATIENT
Start: 2020-11-18 | End: 2020-11-18

## 2020-11-18 RX ORDER — OXCARBAZEPINE 300 MG/1
600 TABLET, FILM COATED ORAL
Refills: 0 | Status: DISCONTINUED | OUTPATIENT
Start: 2020-11-18 | End: 2020-11-19

## 2020-11-18 RX ADMIN — OXCARBAZEPINE 600 MILLIGRAM(S): 300 TABLET, FILM COATED ORAL at 19:10

## 2020-11-18 RX ADMIN — OXCARBAZEPINE 900 MILLIGRAM(S): 300 TABLET, FILM COATED ORAL at 06:16

## 2020-11-18 RX ADMIN — LACOSAMIDE 50 MILLIGRAM(S): 50 TABLET ORAL at 19:12

## 2020-11-18 RX ADMIN — ISOSORBIDE MONONITRATE 60 MILLIGRAM(S): 60 TABLET, EXTENDED RELEASE ORAL at 06:15

## 2020-11-18 RX ADMIN — SODIUM CHLORIDE 30 MILLILITER(S): 5 INJECTION, SOLUTION INTRAVENOUS at 11:16

## 2020-11-18 RX ADMIN — Medication 50 MILLIGRAM(S): at 06:15

## 2020-11-18 RX ADMIN — TOLVAPTAN 15 MILLIGRAM(S): 15 TABLET ORAL at 19:10

## 2020-11-18 RX ADMIN — ATORVASTATIN CALCIUM 80 MILLIGRAM(S): 80 TABLET, FILM COATED ORAL at 21:20

## 2020-11-18 RX ADMIN — ISOSORBIDE MONONITRATE 30 MILLIGRAM(S): 60 TABLET, EXTENDED RELEASE ORAL at 21:20

## 2020-11-18 RX ADMIN — RANOLAZINE 1000 MILLIGRAM(S): 500 TABLET, FILM COATED, EXTENDED RELEASE ORAL at 06:15

## 2020-11-18 RX ADMIN — Medication 50 MILLIGRAM(S): at 19:11

## 2020-11-18 RX ADMIN — RANOLAZINE 1000 MILLIGRAM(S): 500 TABLET, FILM COATED, EXTENDED RELEASE ORAL at 19:11

## 2020-11-18 RX ADMIN — MAGNESIUM OXIDE 400 MG ORAL TABLET 400 MILLIGRAM(S): 241.3 TABLET ORAL at 10:08

## 2020-11-18 RX ADMIN — Medication 1 MILLIGRAM(S): at 11:16

## 2020-11-18 NOTE — PROGRESS NOTE ADULT - PROBLEM SELECTOR PLAN 1
Pt presented to the ED by recommendation of Dr. Rose with reports of worsening SSCP 8/10, relieved by SL Nitro at home (reports she has been taking it very often for the past several weeks); currently chest pain free, VSS.   - Trop negative x 3  - ECG NSR @ 76bpm, no STT changes  - Pt w/ h/o CAD, most recent cardiac cath 8/18/2016 @[facility] with Dr. JERI Barrientos, PTCA pCX; LM patent stent; pLAD patent stent; D1 patent stent, LCx Ostial 80% discrete lesion RCA angiographically normal.    - Pt. was on Cath schedule 11/19/20 with Dr. JERI Barrientos for UA and abnormal CCTA revealing possible restenosis of pLAD stent. Pt now added to the schedule for cath on Monday 11/16, Dr. Barrientos made aware, pt consented.   -s/p Diagnostic Cath revealing cath 11/16/2020: LM patent stent. LAD patent stent. D1 patent stent. LCx mild diffuse (small). RCA normal. EF 65%.. EDP 4mmHg. No AS/MR. R groin AS.  - ECHO 11/14/20 revealed normal left and RV size and systolic function, No significant valvular disease, Small anterior pericardial effusion without tamponade.  - Continue home Brilinta 60mg PO BID, Metoprolol succinate 50mg PO QD, Ranexa 1000mg PO BID, Imdur 30mg PO in PM, 60mg PO in AM, and Atorvastatin 80mg PO QD  - Cleared from cardiology standpoint to be discharged home Pt presented to the ED by recommendation of Dr. Rose with reports of worsening SSCP 8/10, relieved by SL Nitro at home (reports she has been taking it very often for the past several weeks); currently chest pain free, VSS.   - R/O ACS with Trop negative x 3  - ECG NSR @ 76bpm, no STT changes  - Pt w/ h/o CAD, most recent cardiac cath 8/18/2016 @[facility] with Dr. JERI Barrientos, PTCA pCX; LM patent stent; pLAD patent stent; D1 patent stent, LCx Ostial 80% discrete lesion RCA angiographically normal.    - Pt. was on Cath schedule 11/19/20 with Dr. JERI Barrientos for UA and abnormal CCTA revealing possible restenosis of pLAD stent.   - Pt is s/p Diagnostic Cath revealing cath 11/16/2020: LM patent stent. LAD patent stent. D1 patent stent. LCx mild diffuse (small). RCA normal. EF 65%.. EDP 4mmHg. No AS/MR. R groin AS.  - ECHO 11/14/20 revealed normal left and RV size and systolic function, No significant valvular disease, Small anterior pericardial effusion without tamponade.  - Continue home Brilinta 60mg PO BID, Metoprolol succinate 50mg PO QD, Ranexa 1000mg PO BID, Imdur 30mg PO in PM, 60mg PO in AM, and Atorvastatin 80mg PO QD  - Cleared from cardiology standpoint to be discharged home

## 2020-11-18 NOTE — PROGRESS NOTE ADULT - SUBJECTIVE AND OBJECTIVE BOX
TRANSFER ACCEPTANCE NOTE FROM [facility] TO [facility]      INTERVAL HPI/OVERNIGHT EVENTS:  Patient was seen and examined at bedside. As per nurse and patient, no o/n events, patient resting comfortably. No complaints at this time. Patient denies: fever, chills, dizziness, weakness, HA, Changes in vision, CP, palpitations, SOB, cough, N/V/D/C, dysuria, changes in bowel movements, LE edema. ROS otherwise negative.    VITAL SIGNS:  T(F): 97.9 (11-18-20 @ 16:06)  HR: 69 (11-18-20 @ 16:06)  BP: 139/83 (11-18-20 @ 16:06)  RR: 16 (11-18-20 @ 16:06)  SpO2: 100% (11-18-20 @ 16:06)  Wt(kg): --    PHYSICAL EXAM:    Constitutional: WDWN, NAD  HEENT: PERRL, EOMI, sclera non-icteric, neck supple, trachea midline, no masses, no JVD, MMM, good dentition  Respiratory: CTA b/l, good air entry b/l, no wheezing, no rhonchi, no rales, without accessory muscle use and no intercostal retractions  Cardiovascular: RRR, normal S1S2, no M/R/G  Gastrointestinal: soft, NTND, no masses palpable, BS normal  Extremities: Warm, well perfused, pulses equal bilateral upper and lower extremities, no edema, no clubbing  Neurological: AAOx3, CN Grossly intact  Skin: Normal temperature, warm, dry    MEDICATIONS  (STANDING):  atorvastatin 80 milliGRAM(s) Oral at bedtime  folic acid 1 milliGRAM(s) Oral daily  influenza   Vaccine 0.5 milliLiter(s) IntraMuscular once  isosorbide   mononitrate ER Tablet (IMDUR) 30 milliGRAM(s) Oral <User Schedule>  isosorbide   mononitrate ER Tablet (IMDUR) 60 milliGRAM(s) Oral <User Schedule>  lacosamide 50 milliGRAM(s) Oral two times a day  LORazepam   Injectable 2 milliGRAM(s) IV Push once  metoprolol succinate ER 50 milliGRAM(s) Oral two times a day  OXcarbazepine 600 milliGRAM(s) Oral two times a day  ranolazine 1000 milliGRAM(s) Oral two times a day  sodium chloride 0.9%. 500 milliLiter(s) (75 mL/Hr) IV Continuous <Continuous>  sodium chloride 3%. 500 milliLiter(s) (30 mL/Hr) IV Continuous <Continuous>  tolvaptan 15 milliGRAM(s) Oral once    MEDICATIONS  (PRN):  nitroglycerin     SubLingual 0.4 milliGRAM(s) SubLingual every 5 minutes PRN Chest Pain      Allergies    No Known Allergies    Intolerances        LABS:                        14.1   7.53  )-----------( 226      ( 18 Nov 2020 06:20 )             43.1     11-18    121<L>  |  92<L>  |  10  ----------------------------<  132<H>  4.1   |  20<L>  |  0.62    Ca    8.3<L>      18 Nov 2020 15:18  Mg     1.8     11-18              RADIOLOGY & ADDITIONAL TESTS:  Reviewed TRANSFER ACCEPTANCE NOTE FROM [facility], Cardiology TO [address] [place] [address] Neurology     Hospital Course:      34 y/o female with PMHx of HTN, HLD, CAD vs. coronary vasospasm s/p multiple PCI's (most recent 2016 @ [facility]), L hemispheric TIAs (intermittent speech difficulties), severe B/L moyamoya s/p left STA-MCA bypass in 2017 c/b acute intracranial hemorrhage (known to Dr. Long) who presented to the [facility] ED on 11/13 sent by Dr. Rose for evaluation of SSCP, relieved by SL Nitro and a recent abnormal CCTA. Pt was scheduled for cardiac cath as ambulatory for Thursday 11/19/20 however her pain has worsened. Pt was admitted to [address] [place] [address] for further management of unstable angina and plan for cardiac cath with Dr. Barrientos on Monday 11/16/20. Troponin negative x 3, ECG NSR @ 76bpm, no STT changes. CXR with no acute pathology.   CCTA 11/11/20 revealed hypoattenuation in the proximal portion, cannot rule out in stent restenosis, patent stent in D1 and LM, Remaining coronary arteries appear normal. ECHO 11/14/20 revealed normal left and RV size and systolic function, No significant valvular disease, Small anterior pericardial effusion without tamponade.  Pt is s/p Cardiac Cath 11/16/2020: LM patent stent. LAD patent stent. D1 patent stent. LCx mild diffuse (small). RCA normal. EF 65%.. EDP 4mmHg. No AS/MR. R groin AS, no complications.       Hospital complicated by  seizure post cath 11/16; stat CT head revealed no intercranial hemorrhage/transcortical infarct..  Pt noted arousable shortly thereafter; AO X 3.   Epilepsy team contacted, made aware of CT head findings and 24 hr EEG, placed.  Neurology DR. Barney team consulted; evaluated pt at bedside, thinks Sz is triggered by cath.  Of note:  per Dr. Walter, pt had similar  stress induced seizure event on her prior cath.   Oxycarbenzepine levels done is <2.0.  Per Neuro, increased Oxcarbenzepine from 600mg to 900mg daily.  EEG removed yesterday at pt's request, results pending.    Of note: pt noted with worsening hyponatremia (Na+ 121 today). admit NA+ 130's; likely in the setting of Seizure medications.  Renal evaluated, recs to give NS 3% @ 30cc x 3hours, repeat BMP ordered for 2PM.  Renal will continue to follow.         INTERVAL HPI/OVERNIGHT EVENTS:  Patient was seen and examined at bedside. Patient is saying she wants to leave AMA. Risks discussed with patient including severe brain damage, seizures, and ultimately death. Patient understands and accepts risks. she wants to leave.... called [facility] unit as patient apparently been asking to leave AMA prior to transfer. Transfer initiated.         VITAL SIGNS:  T(F): 97.9 (11-18-20 @ 16:06)  HR: 69 (11-18-20 @ 16:06)  BP: 139/83 (11-18-20 @ 16:06)  RR: 16 (11-18-20 @ 16:06)  SpO2: 100% (11-18-20 @ 16:06)  Wt(kg): --    PHYSICAL EXAM:    Constitutional: WDWN, NAD  HEENT: PERRL, EOMI, sclera non-icteric, neck supple, trachea midline, no masses, no JVD, MMM, good dentition  Respiratory: CTA b/l, good air entry b/l, no wheezing, no rhonchi, no rales, without accessory muscle use and no intercostal retractions  Cardiovascular: RRR, normal S1S2, no M/R/G  Gastrointestinal: soft, NTND, no masses palpable, BS normal  Extremities: Warm, well perfused, pulses equal bilateral upper and lower extremities, no edema, no clubbing  Neurological: AAOx3, CN Grossly intact  Skin: Normal temperature, warm, dry    MEDICATIONS  (STANDING):  atorvastatin 80 milliGRAM(s) Oral at bedtime  folic acid 1 milliGRAM(s) Oral daily  influenza   Vaccine 0.5 milliLiter(s) IntraMuscular once  isosorbide   mononitrate ER Tablet (IMDUR) 30 milliGRAM(s) Oral <User Schedule>  isosorbide   mononitrate ER Tablet (IMDUR) 60 milliGRAM(s) Oral <User Schedule>  lacosamide 50 milliGRAM(s) Oral two times a day  LORazepam   Injectable 2 milliGRAM(s) IV Push once  metoprolol succinate ER 50 milliGRAM(s) Oral two times a day  OXcarbazepine 600 milliGRAM(s) Oral two times a day  ranolazine 1000 milliGRAM(s) Oral two times a day  sodium chloride 0.9%. 500 milliLiter(s) (75 mL/Hr) IV Continuous <Continuous>  sodium chloride 3%. 500 milliLiter(s) (30 mL/Hr) IV Continuous <Continuous>  tolvaptan 15 milliGRAM(s) Oral once    MEDICATIONS  (PRN):  nitroglycerin     SubLingual 0.4 milliGRAM(s) SubLingual every 5 minutes PRN Chest Pain      Allergies    No Known Allergies    Intolerances        LABS:                        14.1   7.53  )-----------( 226      ( 18 Nov 2020 06:20 )             43.1     11-18    121<L>  |  92<L>  |  10  ----------------------------<  132<H>  4.1   |  20<L>  |  0.62    Ca    8.3<L>      18 Nov 2020 15:18  Mg     1.8     11-18              RADIOLOGY & ADDITIONAL TESTS:  Reviewed

## 2020-11-18 NOTE — PROGRESS NOTE ADULT - ASSESSMENT
36 y/o Lao speaking female with PMHx of HTN, HLD, CAD vs. coronary vasospasm s/p multiple PCI's (most recent 2016 @ [facility]), L hemispheric TIAs (intermittent speech difficulties), severe B/L moyamoya s/p left STA-MCA bypass in 2017 c/b acute intracranial hemorrhage (known to Dr. Long who has cleared pt to load for DAPT) who presented to the [facility] ED on 11/13 sent by Dr. Rose for evaluation of SSCP, relieved by SL Nitro and a recent abnormal CCTA. Pt was scheduled for cardiac cath as ambulatory for Thursday 11/19/20 however her pain worsened. Pt is now admitted to [address] [place] [address] for further management of unstable angina, s/p diagnostic cardiac cath with Dr. Barrientos on 11/16/20, currently awaiting Oxcarbenzepine, levels, found with hyponatremia today likely in the setting of Sz medications and is now transferred to neuro service for further management.

## 2020-11-18 NOTE — PROGRESS NOTE ADULT - ASSESSMENT
34 y/o Albanian speaking female with PMHx of HTN, HLD, CAD vs. coronary vasospasm s/p multiple PCI's (most recent 2016 @ [facility]), L hemispheric TIAs (intermittent speech difficulties), severe B/L moyamoya s/p left STA-MCA bypass in 2017 c/b acute intracranial hemorrhage (known to Dr. Long who has cleared pt to load for DAPT) who presented to the [facility] ED on 11/13 sent by Dr. Rose for evaluation of SSCP, relieved by SL Nitro and a recent abnormal CCTA. Pt was scheduled for cardiac cath as ambulatory for Thursday 11/19/20 however her pain worsened. Pt is now admitted to [address] [place] [address] for further management of unstable angina, s/p diagnostic cardiac cath with Dr. Barrientos on 11/16/20.        34 y/o Sami speaking female with PMHx of HTN, HLD, CAD vs. coronary vasospasm s/p multiple PCI's (most recent 2016 @ [facility]), L hemispheric TIAs (intermittent speech difficulties), severe B/L moyamoya s/p left STA-MCA bypass in 2017 c/b acute intracranial hemorrhage (known to Dr. Long who has cleared pt to load for DAPT) who presented to the [facility] ED on 11/13 sent by Dr. Rose for evaluation of SSCP, relieved by SL Nitro and a recent abnormal CCTA. Pt was scheduled for cardiac cath as ambulatory for Thursday 11/19/20 however her pain worsened. Pt is now admitted to [address] [place] [address] for further management of unstable angina, s/p diagnostic cardiac cath with Dr. Barrientos on 11/16/20, currently awaiting Oxcarbenzepine, levels, found with hyponatremia today likely in the setting of Sz medications and is now transferred to neuro service for further management.

## 2020-11-18 NOTE — PROGRESS NOTE ADULT - PROBLEM SELECTOR PLAN 1
-Noted with  hyponatremia (Na+ 121 today). Admit NA+ 130's; likely in the setting of Seizure medications/ SIADH. urine osms 913, urine NA elevated   -Renal evaluated, recs to give NS 3% @ 30cc x 3hours- s/p NS 3%  -repeat BMP ordered for 10PM   -F/U Renal recs, start tolvaptan 15mg

## 2020-11-18 NOTE — CONSULT NOTE ADULT - ATTENDING COMMENTS
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

## 2020-11-18 NOTE — PROGRESS NOTE ADULT - PROBLEM SELECTOR PLAN 2
-H/o chronic seizures at home  -Pt with stress induced seizure post cath 11/16; stat CT head prelim read revealed no intercranial hemorrhage/transcortical infarct. Pt noted to be arousable afterwards; AO X 3.    -Epilepsy/Neuro team contacted, aware of CT head findings and 24 hr EEG, placed (results pending);  EEG removed at pt's request on 11/17/20 in PM. F/U results  -Increased from Oxcarbazepine 600mg BID to 900mg BID on 11/17/20  -Oxcarbazepine level done 11/17/20; results pending (sent out test per lab)  -Per neuro; pt was on Oxcarbazepine 1200mg BID at home.    -F/U Neuro results recs.    -Will likely discharge tomorrow if stable

## 2020-11-18 NOTE — PROCEDURE NOTE - NSICDXPROCEDURE_GEN_ALL_CORE_FT
PROCEDURES:  Ultrasound guided venous access 18-Nov-2020 20:14:11  Ange Riley  Insertion, needle, vein 18-Nov-2020 20:14:00  Ange Riley

## 2020-11-18 NOTE — PROGRESS NOTE ADULT - PROBLEM SELECTOR PLAN 5
-H/o chronic seizures at home  -Pt with stress induced seizure post cath 11/16; stat CT head prelim read revealed no intercranial hemorrhage/transcortical infarct. Pt currently arousable; AO X 3.    -Epilepsy/Neuro team contacted, aware of CT head findings and 24 hr EEG, placed (results pending);  EEG removed this evening at pt's request.  -Increased from Oxcarbazepine 600mg BID to 900mg BID today; Oxcarbazepine level done today; results pending (sent out test per lab)  -Per neuro; pt was on Oxcarbazepine 1200mg BID at home.    -F/U Neuro results recs.    -Will likely discharge tomorrow if stable    - DVT PPX: SCD's   - Dispo: Pending clinical progression -Noted with  hyponatremia (Na+ 121 today). Admit NA+ 130's; likely in the setting of Seizure medications.    -Renal evaluated, recs to give NS 3% @ 30cc x 3hours  -repeat BMP ordered for 2PM.    -F/U Renal recs.

## 2020-11-18 NOTE — CONSULT NOTE ADULT - SUBJECTIVE AND OBJECTIVE BOX
HPI  35y Female w/ PMH HTN, HLD, CAD vs. coronary vasospasm s/p multiple PCI's (most recent 2016 @ [facility]), L hemispheric TIAs (intermittent speech difficulties), severe B/L moyamoya s/p left STA-MCA bypass in 2017 c/b acute intracranial hemorrhage (known to Dr. Long).   Patient had a witnessed episode of GTC post op after cardiac path on . Primary team described as "jerking of all extremities and foaming at the mouth". Pt taken for CT with no acute events on CT. Later, pt started to wake up but would not talk.    Seizure history: Dr. Walter's patient.  Epilepsy since age of 9. Was on Oxcarb 600 mg BID, recently increase to 1200 mg BID due to low blood level on 10.08.2020.  Prior EEG  AEEG -2020: epileptogenic focus L temporal area (occasional L temp spikes, Continuous L temp slowing)  REEG 10/31/19: epileptogenic focus L temporal area     Prior imaging  MRI Brain Findings: MRA 17- attenuated caliber of L STA w/ no signal identified in distal bypass segment which may be secondary to occlusion.  MRI brain 17-s/p L FT craniotomy for STA bypass with post surgical changes. Stable watershed ischemia w/in centrum semiovale b/l. No acute ischemia.       Review of Systems:  CONSTITUTIONAL:  No weight loss, fever, chills, weakness or fatigue.  HEENT:  Eyes:  No visual loss, blurred vision, double vision or yellow sclerae. Ears, Nose, Throat:  No hearing loss, sneezing, congestion, runny nose or sore throat.  SKIN:  No rash or itching.  CARDIOVASCULAR:  No chest pain, chest pressure or chest discomfort. No palpitations or edema.  RESPIRATORY:  No shortness of breath, cough or sputum.  GASTROINTESTINAL:  No anorexia, nausea, vomiting or diarrhea. No abdominal pain or blood.  GENITOURINARY: No Burning on urination.   NEUROLOGICAL:  see HPI  MUSCULOSKELETAL:  No muscle, back pain, joint pain or stiffness.  HEMATOLOGIC:  No anemia, bleeding or bruising.  LYMPHATICS:  No enlarged nodes. No history of splenectomy.  PSYCHIATRIC:  No history of depression or anxiety.  ENDOCRINOLOGIC:  No reports of sweating, cold or heat intolerance. No polyuria or polydipsia.  ALLERGIES:  No history of asthma, hives, eczema or rhinitis.    PAST MEDICAL & SURGICAL HISTORY:  TIA  Moyamoya disease  Dyslipidemia  CAD (coronary artery disease)  Hx Cardiac stent which was re occluded then open Hx of cardiac catheterization PTCA X3 on Brilinta Aspirin  Essential hypertension  Epilepsy  Other postprocedural status  S/P     FAMILY HISTORY:  No pertinent family history  No significant family history     Allergies  No Known Allergies    MEDICATIONS  (STANDING):  atorvastatin 80 milliGRAM(s) Oral at bedtime  folic acid 1 milliGRAM(s) Oral daily  influenza   Vaccine 0.5 milliLiter(s) IntraMuscular once  isosorbide   mononitrate ER Tablet (IMDUR) 30 milliGRAM(s) Oral <User Schedule>  isosorbide   mononitrate ER Tablet (IMDUR) 60 milliGRAM(s) Oral <User Schedule>  LORazepam   Injectable 2 milliGRAM(s) IV Push once  metoprolol succinate ER 50 milliGRAM(s) Oral two times a day  OXcarbazepine 600 milliGRAM(s) Oral two times a day  ranolazine 1000 milliGRAM(s) Oral two times a day  sodium chloride 0.9%. 500 milliLiter(s) (75 mL/Hr) IV Continuous <Continuous>    MEDICATIONS  (PRN):  nitroglycerin     SubLingual 0.4 milliGRAM(s) SubLingual every 5 minutes PRN Chest Pain    T(C): 35.8 (20 @ 09:30), Max: 36.7 (20 @ 22:40)  HR: 70 (20 @ 08:23) (68 - 82)  BP: 138/81 (20 @ 08:23) (133/81 - 209/125)  RR: 16 (20 @ 08:23) (16 - 18)  SpO2: 98% (20 @ 08:23) (93% - 98%)    General:  Constitutional:  Sitting comfortably in NAD.  Cognitive:  Orientation, language, memory and knowledge screens intact.  Cranial Nerves:  II: Full to confrontation. III/IV/VI: PERRL EOMF No nystagmus  V1V2V3: Symmetric, VII: Face appears symmetric VIII: Normal to screening, IX/X: Palate Elevates Symmetrical  XI: Trapezius Symmetric  XII: Tongue midline  Motor:  Power: 5/5 throughout, tone: normal x 4 limbs, no tremor   Sensation:  Intact to light touch. Intact to pinprick/temperature and vibration.  Coordination/Gait:  Finger-nose-finger intact, normal rapid-alternating movements.  Fine motor normal with normal rapid finger taps and heel-toe tapping   narrow based gait, tandem forward and back ok    Labs  CBC Full  -  ( 2020 06:30 )  WBC Count : 6.46 K/uL  RBC Count : 4.99 M/uL  Hemoglobin : 14.3 g/dL  Hematocrit : 42.9 %  Platelet Count - Automated : 243 K/uL  Mean Cell Volume : 86.0 fl  Mean Cell Hemoglobin : 28.7 pg  Mean Cell Hemoglobin Concentration : 33.3 gm/dL  Auto Neutrophil # : x  Auto Lymphocyte # : x  Auto Monocyte # : x  Auto Eosinophil # : x  Auto Basophil # : x  Auto Neutrophil % : x  Auto Lymphocyte % : x  Auto Monocyte % : x  Auto Eosinophil % : x  Auto Basophil % : x        126<L>  |  93<L>  |  13  ----------------------------<  83  4.2   |  20<L>  |  0.68        Ca    8.8      2020 06:30  Mg     1.8             PT/INR - ( 2020 17:00 )   PT: 12.8 sec;   INR: 1.07          PTT - ( 2020 17:00 )  PTT:38.7 sec  Carbamazepine Level, Serum: <2.0 ug/mL <L> [4.0 - 12.0] ( @ 06:30)      EEG: Negative    IHx  Developed hyponatremia over past 48 hs with Na today 121. Nos seizures

## 2020-11-18 NOTE — CONSULT NOTE ADULT - SUBJECTIVE AND OBJECTIVE BOX
HPI:  35 year old female with no history of renal disease, HTN, HLD, CAD s/p multiple PCI's (most recent 2016 @ [facility]), CVA, epilepsy, severe moyamoya s/p left STA-MCA bypass in 2017 c/b acute intracranial hemorrhage presented with worsening chest pain. She is s/p cardiac catheterization on 11/16 which revealed patents stents and no significant stenosis. Shortly after the procedure she had a witnessed seizure and her oxcarbazepine was increased from 600mg BID to 900mg BID. Per neurology, she was on oxcarbazepine 1200mg BID at home.  Nephrology consulted for acute hyponatremia. Sodium on admission was 134 (11/13) which appears to be her baseline. Prior to cath and seizure dropped to 127 (11/16). Today, sodium is 121 (11/18). Per patient, she has been drinking 2+ liters of fluid per day. Denies headache, dizziness, visual changes, chest pain, SOB, nausea, vomiting . Per chart, patient had an episode of hyponetremia following STA-MCA bypass in 2017 which was believed to be related to oxcarbazepine and improved with NS and NaCl tabs.   PAST MEDICAL & SURGICAL HISTORY:  As per HPI.     Allergies:  No Known Allergies    Home Medications:   atorvastatin 80 milliGRAM(s) Oral at bedtime  folic acid 1 milliGRAM(s) Oral daily  influenza   Vaccine 0.5 milliLiter(s) IntraMuscular once  isosorbide   mononitrate ER Tablet (IMDUR) 30 milliGRAM(s) Oral <User Schedule>  isosorbide   mononitrate ER Tablet (IMDUR) 60 milliGRAM(s) Oral <User Schedule>  LORazepam   Injectable 2 milliGRAM(s) IV Push once  metoprolol succinate ER 50 milliGRAM(s) Oral two times a day  nitroglycerin     SubLingual 0.4 milliGRAM(s) SubLingual every 5 minutes PRN  OXcarbazepine 900 milliGRAM(s) Oral two times a day  ranolazine 1000 milliGRAM(s) Oral two times a day  sodium chloride 0.9%. 500 milliLiter(s) IV Continuous <Continuous>  sodium chloride 3%. 500 milliLiter(s) IV Continuous <Continuous>    Hospital Medications:   MEDICATIONS  (STANDING):  atorvastatin 80 milliGRAM(s) Oral at bedtime  folic acid 1 milliGRAM(s) Oral daily  influenza   Vaccine 0.5 milliLiter(s) IntraMuscular once  isosorbide   mononitrate ER Tablet (IMDUR) 30 milliGRAM(s) Oral <User Schedule>  isosorbide   mononitrate ER Tablet (IMDUR) 60 milliGRAM(s) Oral <User Schedule>  LORazepam   Injectable 2 milliGRAM(s) IV Push once  metoprolol succinate ER 50 milliGRAM(s) Oral two times a day  OXcarbazepine 900 milliGRAM(s) Oral two times a day  ranolazine 1000 milliGRAM(s) Oral two times a day  sodium chloride 0.9%. 500 milliLiter(s) (75 mL/Hr) IV Continuous <Continuous>  sodium chloride 3%. 500 milliLiter(s) (30 mL/Hr) IV Continuous <Continuous>    SOCIAL HISTORY:  Denies ETOh, smoking, drug use     Family History:  No significant family history    VITALS:  T(F): 96.9 (11-18-20 @ 09:32), Max: 98 (11-18-20 @ 00:57)  HR: 74 (11-18-20 @ 11:18)  BP: 137/79 (11-18-20 @ 11:18)  RR: 19 (11-18-20 @ 11:18)  SpO2: 97% (11-18-20 @ 08:59)  Wt(kg): --    11-17 @ 07:01  -  11-18 @ 07:00  --------------------------------------------------------  IN: 720 mL / OUT: 0 mL / NET: 720 mL    11-18 @ 07:01  -  11-18 @ 11:39  --------------------------------------------------------  IN: 180 mL / OUT: 0 mL / NET: 180 mL    Review of Systems:  CONSTITUTIONAL: No fever or chills.  RESPIRATORY: No shortness of breath, cough  CARDIOVASCULAR: No chest pain, shortness of breath, palpitations  GASTROINTESTINAL: No abdominal pain, nausea, vomiting, diarrhea  GENITOURINARY: No urinary frequency, gross hematuria, dysuria  NEUROLOGICAL: No headache, weakness  MUSCULOSKELETAL: No swelling    PHYSICAL EXAM:  GENERAL: Alert, awake, breathing on room air  HEENT: No JVD  CHEST/LUNG: Bilateral clear breath sounds  HEART: Regular rate and rhythm  ABDOMEN: Soft, nontender, non distended  EXTREMITIES: No pedal edema  Neurology: AAOx3, no gross focal neurological deficit    LABS:  11-18    121<L>  |  90<L>  |  12  ----------------------------<  90  4.3   |  21<L>  |  0.67    Ca    8.7      18 Nov 2020 06:20  Mg     1.8     11-18      Creatinine Trend: 0.67 <--, 0.68 <--, 0.69 <--, 0.75 <--, 0.70 <--, 0.78 <--, 0.78 <--, 0.73 <--                        14.1   7.53  )-----------( 226      ( 18 Nov 2020 06:20 )             43.1     Urine Studies:    Creatinine, Random Urine: 152 mg/dL (11-18 @ 10:29)  Sodium, Random Urine: 112 mmol/L (11-18 @ 10:29)  Osmolality, Random Urine: 913 mosm/kg (11-18 @ 10:29)  Potassium, Random Urine: 45 mmol/L (11-18 @ 10:29)    35 year old female with no history of renal disease, HTN, HLD, CAD, CVA, epilepsy, severe moyamoya presents with chest pain s/p clean cardiac cath. Post-procedural course complicated by seizure in the setting of subtherapeutic oxcarbazepine and acute hyponatremia.      Assessment/Plan:   #Acute euvolemic hyponatremia, asymptomatic  -Most likely SIADH given high UOsm and Wale in the setting of oxcarbazepine increase    Recommend   -Commence 3% hypertonic saline at 100cc/h for 3 hours, re-check BMP 30 minutes thereafter   -BID BMP + uric acid + urine lytes (Na, Cr, Urea, Osm)   -Fluid restriction < 1L/24h     Thank you for the opportunity to participate in the care of your patient. The nephrology service remains available to assist with any questions or concerns. Please feel free to reach us by paging the on-call nephrology fellow for urgent issues or as below.     Yan Aguila M.D.   PGY-4, Nephrology Fellow   C: [phone]   P: [phone]     P: [phone] HPI:  35 year old female with no history of renal disease, HTN, HLD, CAD s/p multiple PCI's (most recent 2016 @ [facility]), CVA, epilepsy, severe moyamoya s/p left STA-MCA bypass in 2017 c/b acute intracranial hemorrhage presented with worsening chest pain. She is s/p cardiac catheterization on 11/16 which revealed patents stents and no significant stenosis. Shortly after the procedure she had a witnessed seizure and her oxcarbazepine was increased from 600mg BID to 900mg BID. Per neurology, she was on oxcarbazepine 1200mg BID at home.  Nephrology consulted for acute hyponatremia. Sodium on admission was 134 (11/13) which appears to be her baseline. Prior to cath and seizure dropped to 127 (11/16). Today, sodium is 121 (11/18). Per patient, she has been drinking 2+ liters of fluid per day. Denies headache, dizziness, visual changes, chest pain, SOB, nausea, vomiting . Per chart, patient had an episode of hyponetremia following STA-MCA bypass in 2017 which was believed to be related to oxcarbazepine and improved with NS and NaCl tabs.   PAST MEDICAL & SURGICAL HISTORY:  As per HPI.     Allergies:  No Known Allergies    Home Medications:   atorvastatin 80 milliGRAM(s) Oral at bedtime  folic acid 1 milliGRAM(s) Oral daily  influenza   Vaccine 0.5 milliLiter(s) IntraMuscular once  isosorbide   mononitrate ER Tablet (IMDUR) 30 milliGRAM(s) Oral <User Schedule>  isosorbide   mononitrate ER Tablet (IMDUR) 60 milliGRAM(s) Oral <User Schedule>  LORazepam   Injectable 2 milliGRAM(s) IV Push once  metoprolol succinate ER 50 milliGRAM(s) Oral two times a day  nitroglycerin     SubLingual 0.4 milliGRAM(s) SubLingual every 5 minutes PRN  OXcarbazepine 900 milliGRAM(s) Oral two times a day  ranolazine 1000 milliGRAM(s) Oral two times a day  sodium chloride 0.9%. 500 milliLiter(s) IV Continuous <Continuous>  sodium chloride 3%. 500 milliLiter(s) IV Continuous <Continuous>    Hospital Medications:   MEDICATIONS  (STANDING):  atorvastatin 80 milliGRAM(s) Oral at bedtime  folic acid 1 milliGRAM(s) Oral daily  influenza   Vaccine 0.5 milliLiter(s) IntraMuscular once  isosorbide   mononitrate ER Tablet (IMDUR) 30 milliGRAM(s) Oral <User Schedule>  isosorbide   mononitrate ER Tablet (IMDUR) 60 milliGRAM(s) Oral <User Schedule>  LORazepam   Injectable 2 milliGRAM(s) IV Push once  metoprolol succinate ER 50 milliGRAM(s) Oral two times a day  OXcarbazepine 900 milliGRAM(s) Oral two times a day  ranolazine 1000 milliGRAM(s) Oral two times a day  sodium chloride 0.9%. 500 milliLiter(s) (75 mL/Hr) IV Continuous <Continuous>  sodium chloride 3%. 500 milliLiter(s) (30 mL/Hr) IV Continuous <Continuous>    SOCIAL HISTORY:  Denies ETOh, smoking, drug use     Family History:  No significant family history    VITALS:  T(F): 96.9 (11-18-20 @ 09:32), Max: 98 (11-18-20 @ 00:57)  HR: 74 (11-18-20 @ 11:18)  BP: 137/79 (11-18-20 @ 11:18)  RR: 19 (11-18-20 @ 11:18)  SpO2: 97% (11-18-20 @ 08:59)  Wt(kg): --    11-17 @ 07:01  -  11-18 @ 07:00  --------------------------------------------------------  IN: 720 mL / OUT: 0 mL / NET: 720 mL    11-18 @ 07:01  -  11-18 @ 11:39  --------------------------------------------------------  IN: 180 mL / OUT: 0 mL / NET: 180 mL    Review of Systems:  CONSTITUTIONAL: No fever or chills.  RESPIRATORY: No shortness of breath, cough  CARDIOVASCULAR: No chest pain, shortness of breath, palpitations  GASTROINTESTINAL: No abdominal pain, nausea, vomiting, diarrhea  GENITOURINARY: No dysuria  NEUROLOGICAL: No headache, weakness  MUSCULOSKELETAL: No swelling    PHYSICAL EXAM:  GENERAL: Alert, awake, breathing on room air  HEENT: No JVD  CHEST/LUNG: Bilateral clear breath sounds  HEART: Regular rate and rhythm  ABDOMEN: Soft, nontender, non distended  EXTREMITIES: No pedal edema  Neurology: AAOx3, no gross focal neurological deficit    LABS:  11-18    121<L>  |  90<L>  |  12  ----------------------------<  90  4.3   |  21<L>  |  0.67    Ca    8.7      18 Nov 2020 06:20  Mg     1.8     11-18      Creatinine Trend: 0.67 <--, 0.68 <--, 0.69 <--, 0.75 <--, 0.70 <--, 0.78 <--, 0.78 <--, 0.73 <--                        14.1   7.53  )-----------( 226      ( 18 Nov 2020 06:20 )             43.1     Urine Studies:    Creatinine, Random Urine: 152 mg/dL (11-18 @ 10:29)  Sodium, Random Urine: 112 mmol/L (11-18 @ 10:29)  Osmolality, Random Urine: 913 mosm/kg (11-18 @ 10:29)  Potassium, Random Urine: 45 mmol/L (11-18 @ 10:29)    35 year old female with no history of renal disease, HTN, HLD, CAD, CVA, epilepsy, severe moyamoya presents with chest pain s/p clean cardiac cath. Post-procedural course complicated by seizure in the setting of subtherapeutic oxcarbazepine and acute hyponatremia.      Assessment/Plan:   #Acute euvolemic hyponatremia, asymptomatic  -Most likely SIADH given high UOsm and Wale in the setting of oxcarbazepine dose increased     Recommend   -Commence 3% hypertonic saline at 100cc/h for 3 hours, re-check BMP @2pm   -BID BMP + uric acid + urine lytes (Na, Cr, Urea, Osm)   -Fluid restriction < 1L/24h   -Seizure medications management per Neurology     Thank you for the opportunity to participate in the care of your patient. The nephrology service remains available to assist with any questions or concerns. Please feel free to reach us by paging the on-call nephrology fellow for urgent issues or as below.     Yan Aguila M.D.   PGY-4, Nephrology Fellow   C: [phone]   P: [phone]     P: [phone]

## 2020-11-18 NOTE — CONSULT NOTE ADULT - REASON FOR ADMISSION
Unstable Angina
Unstable Angina.  Pt. also recently had abnormal CCTA
Unstable Angina.  Pt. also recently had abnormal CCTA
Angina
Unstable Angina.  Pt. also recently had abnormal CCTA

## 2020-11-18 NOTE — PROGRESS NOTE ADULT - NSHPATTENDINGPLANDISCUSS_GEN_ALL_CORE
the patient and cardiac care team
the patient, Dr. Rose and cardiac team
the patient, Dr. Jamison, Dr. Aden and cardiac care team

## 2020-11-18 NOTE — PROCEDURE NOTE - NSPOSTCAREGUIDE_GEN_A_CORE
Keep the cast/splint/dressing clean and dry/Verbal/written post procedure instructions were given to patient/caregiver/Instructed patient/caregiver regarding signs and symptoms of infection/Instructed patient/caregiver to follow-up with primary care physician/Care for catheter as per unit/ICU protocols

## 2020-11-18 NOTE — PROGRESS NOTE ADULT - SUBJECTIVE AND OBJECTIVE BOX
INTERVENTIONAL CARDIOLOGY PA TRANSFER NOTE TO NEUROLOGY    HPI:  36 y/o female with PMHx of HTN, HLD, CAD vs. coronary vasospasm s/p multiple PCI's (most recent 2016 @ [facility]), L hemispheric TIAs (intermittent speech difficulties), severe B/L moyamoya s/p left STA-MCA bypass in 2017 c/b acute intracranial hemorrhage (known to Dr. Long) who presented to the [facility] ED on 11/13 sent by Dr. Rose for evaluation of SSCP, relieved by SL Nitro and a recent abnormal CCTA. Pt was scheduled for cardiac cath as ambulatory for Thursday 11/19/20 however her pain has worsened. Pt was admitted to [address] [place] [address] for further management of unstable angina and plan for cardiac cath with Dr. Barrientos on Monday 11/16/20. Troponin negative x 3, ECG NSR @ 76bpm, no STT changes. CXR with no acute pathology.   CCTA 11/11/20 revealed hypoattenuation in the proximal portion, cannot rule out in stent restenosis, patent stent in D1 and LM, Remaining coronary arteries appear normal. ECHO 11/14/20 revealed normal left and RV size and systolic function, No significant valvular disease, Small anterior pericardial effusion without tamponade.  Pt is s/p Cardiac Cath 11/16/2020: LM patent stent. LAD patent stent. D1 patent stent. LCx mild diffuse (small). RCA normal. EF 65%.. EDP 4mmHg. No AS/MR. R groin AS, no complications.       Hospital complicated by  seizure post cath 11/16; stat CT head revealed no intercranial hemorrhage/transcortical infarct..  Pt noted arousable shortly thereafter; AO X 3.   Epilepsy team contacted, made aware of CT head findings and 24 hr EEG, placed.  Neurology DR. Barney team consulted; evaluated pt at bedside, thinks Sz is triggered by cath.  Of note:  per Dr. Walter, pt had similar  stress induced seizure event on her prior cath.   Oxycarbenzepine levels done is <2.0.  Per Neuro, increased Oxycarbenzepine from 600mg to 900mg daily.  EEG removed yesterday     S: Pt seen and examined bedside found NAD, denies  C/P, SOB, N/V, dizziness, palpitations, and diaphoresis, denies fever/chills, dysuria, abdominal pain, diarrhea, and cough.    12 Point ROS otherwise negative except as per HPI/subjective.     O: Vital Signs Last 24 Hrs  T(C): 36.1 (18 Nov 2020 09:32), Max: 36.7 (18 Nov 2020 00:57)  T(F): 96.9 (18 Nov 2020 09:32), Max: 98 (18 Nov 2020 00:57)  HR: 74 (18 Nov 2020 11:18) (68 - 81)  BP: 137/79 (18 Nov 2020 11:18) (131/72 - 160/85)  BP(mean): 95 (18 Nov 2020 05:53) (95 - 121)  RR: 19 (18 Nov 2020 11:18) (16 - 19)  SpO2: 97% (18 Nov 2020 08:59) (97% - 97%)    PHYSICAL EXAM:  GEN: NAD  HEENT: No JVD  PULM:  CTA B/L  CARD:  RRR, S1 and S2   ABD: +BS, NT, soft/ND	  EXT: No Edema B/L LE  NEURO: A+Ox3, no focal deficit  PSYCH: Mood Appropriate    LABS:                        14.1   7.53  )-----------( 226      ( 18 Nov 2020 06:20 )             43.1     11-18    121<L>  |  90<L>  |  12  ----------------------------<  90  4.3   |  21<L>  |  0.67    Ca    8.7      18 Nov 2020 06:20  Mg     1.8     11-18      PT/INR - ( 16 Nov 2020 17:00 )   PT: 12.8 sec;   INR: 1.07          PTT - ( 16 Nov 2020 17:00 )  PTT:38.7 sec  Troponin T, Serum: <0.01 ng/mL (11-14-20 @ 05:55)  Troponin T, Serum: <0.01 ng/mL (11-14-20 @ 00:00)  Troponin T, Serum: <0.01 ng/mL (11-13-20 @ 20:05)      11-17 @ 07:01  -  11-18 @ 07:00  --------------------------------------------------------  IN: 720 mL / OUT: 0 mL / NET: 720 mL    11-18 @ 07:01  -  11-18 @ 12:12  --------------------------------------------------------  IN: 180 mL / OUT: 0 mL / NET: 180 mL      Daily     Daily    INTERVENTIONAL CARDIOLOGY PA TRANSFER NOTE TO NEUROLOGY    HPI:  36 y/o female with PMHx of HTN, HLD, CAD vs. coronary vasospasm s/p multiple PCI's (most recent 2016 @ [facility]), L hemispheric TIAs (intermittent speech difficulties), severe B/L moyamoya s/p left STA-MCA bypass in 2017 c/b acute intracranial hemorrhage (known to Dr. Long) who presented to the [facility] ED on 11/13 sent by Dr. Rose for evaluation of SSCP, relieved by SL Nitro and a recent abnormal CCTA. Pt was scheduled for cardiac cath as ambulatory for Thursday 11/19/20 however her pain has worsened. Pt was admitted to [address] [place] [address] for further management of unstable angina and plan for cardiac cath with Dr. Barrientos on Monday 11/16/20. Troponin negative x 3, ECG NSR @ 76bpm, no STT changes. CXR with no acute pathology.   CCTA 11/11/20 revealed hypoattenuation in the proximal portion, cannot rule out in stent restenosis, patent stent in D1 and LM, Remaining coronary arteries appear normal. ECHO 11/14/20 revealed normal left and RV size and systolic function, No significant valvular disease, Small anterior pericardial effusion without tamponade.  Pt is s/p Cardiac Cath 11/16/2020: LM patent stent. LAD patent stent. D1 patent stent. LCx mild diffuse (small). RCA normal. EF 65%.. EDP 4mmHg. No AS/MR. R groin AS, no complications.       Hospital complicated by  seizure post cath 11/16; stat CT head revealed no intercranial hemorrhage/transcortical infarct..  Pt noted arousable shortly thereafter; AO X 3.   Epilepsy team contacted, made aware of CT head findings and 24 hr EEG, placed.  Neurology DR. Barney team consulted; evaluated pt at bedside, thinks Sz is triggered by cath.  Of note:  per Dr. Walter, pt had similar  stress induced seizure event on her prior cath.   Oxycarbenzepine levels done is <2.0.  Per Neuro, increased Oxcarbenzepine from 600mg to 900mg daily.  EEG removed yesterday at pt's request, results pending.    Of note: pt noted with worsening hyponatremia (Na+ 121 today). admit NA+ 130's; likely in the setting of Seizure medications.  Renal evaluated, recs to give NS 3% @ 30cc x 3hours, repeat BMP ordered for 2PM.  Renal will continue to follow.     S: Pt seen and examined bedside found NAD, denies  C/P, SOB, N/V, dizziness, palpitations, and diaphoresis, denies fever/chills, dysuria, abdominal pain, diarrhea, and cough.    O: Vital Signs Last 24 Hrs  T(C): 36.1 (18 Nov 2020 09:32), Max: 36.7 (18 Nov 2020 00:57)  T(F): 96.9 (18 Nov 2020 09:32), Max: 98 (18 Nov 2020 00:57)  HR: 74 (18 Nov 2020 11:18) (68 - 81)  BP: 137/79 (18 Nov 2020 11:18) (131/72 - 160/85)  BP(mean): 95 (18 Nov 2020 05:53) (95 - 121)  RR: 19 (18 Nov 2020 11:18) (16 - 19)  SpO2: 97% (18 Nov 2020 08:59) (97% - 97%)    PHYSICAL EXAM:  GEN: NAD  HEENT: No JVD  PULM:  CTA B/L  CARD:  RRR, S1 and S2   ABD: +BS, NT, soft/ND	  EXT: No Edema B/L LE  NEURO: A+Ox3, no focal deficit  PSYCH: Mood Appropriate    LABS:                        14.1   7.53  )-----------( 226      ( 18 Nov 2020 06:20 )             43.1     11-18    121<L>  |  90<L>  |  12  ----------------------------<  90  4.3   |  21<L>  |  0.67    Ca    8.7      18 Nov 2020 06:20  Mg     1.8     11-18      PT/INR - ( 16 Nov 2020 17:00 )   PT: 12.8 sec;   INR: 1.07          PTT - ( 16 Nov 2020 17:00 )  PTT:38.7 sec  Troponin T, Serum: <0.01 ng/mL (11-14-20 @ 05:55)  Troponin T, Serum: <0.01 ng/mL (11-14-20 @ 00:00)  Troponin T, Serum: <0.01 ng/mL (11-13-20 @ 20:05)      11-17 @ 07:01  -  11-18 @ 07:00  --------------------------------------------------------  IN: 720 mL / OUT: 0 mL / NET: 720 mL    11-18 @ 07:01  -  11-18 @ 12:12  --------------------------------------------------------  IN: 180 mL / OUT: 0 mL / NET: 180 mL

## 2020-11-18 NOTE — PROGRESS NOTE ADULT - PROBLEM SELECTOR PLAN 3
Pt presented to the ED by recommendation of Dr. Rose with reports of worsening SSCP 8/10, relieved by SL Nitro at home (reports she has been taking it very often for the past several weeks); currently chest pain free, VSS.   - R/O ACS with Trop negative x 3  - ECG NSR @ 76bpm, no STT changes  - Pt w/ h/o CAD, most recent cardiac cath 8/18/2016 @[facility] with Dr. JEIR Barrientos, PTCA pCX; LM patent stent; pLAD patent stent; D1 patent stent, LCx Ostial 80% discrete lesion RCA angiographically normal.    - Pt. was on Cath schedule 11/19/20 with Dr. JERI Barrientos for UA and abnormal CCTA revealing possible restenosis of pLAD stent.   - Pt is s/p Diagnostic Cath revealing cath 11/16/2020: LM patent stent. LAD patent stent. D1 patent stent. LCx mild diffuse (small). RCA normal. EF 65%.. EDP 4mmHg. No AS/MR. R groin AS.  - ECHO 11/14/20 revealed normal left and RV size and systolic function, No significant valvular disease, Small anterior pericardial effusion without tamponade.  - Continue home Brilinta 60mg PO BID, Metoprolol succinate 50mg PO QD, Ranexa 1000mg PO BID, Imdur 30mg PO in PM, 60mg PO in AM, and Atorvastatin 80mg PO QD  - Cleared from cardiology standpoint to be discharged home

## 2020-11-18 NOTE — DISCHARGE NOTE NURSING/CASE MANAGEMENT/SOCIAL WORK - PATIENT PORTAL LINK FT
You can access the FollowMyHealth Patient Portal offered by [facility] by registering at the following website: [email or web address]. By joining [employer]’s FollowMyHealth portal, you will also be able to view your health information using other applications (apps) compatible with our system.

## 2020-11-18 NOTE — CONSULT NOTE ADULT - ASSESSMENT
35y Female with a PMH of longstanding history of epilepsy on Oxcarb,  CAD s/p multiple PCI's, L hemispheric TIAs, severe B/L moyamoya s/p left STA-MCA bypass in 2017 c/b acute ICH. She had an episode of GTC post op after cardiac path. Now has back to her baseline. Overnight EEG showed abundant L temporal slowing, occasional L temporal sharps and semi-rhythmic delta activities, no obvious change compared to previous study. Her home dose of Oxcarb has been recently increased to 1200 mg BID due to low blood level however she feels dizzy on higher dose.    Hyponatremia due to OXC (121)    Plan    Will need another AED due to low Na  Will transfer to Neurology/epilepsy  Will start Vimpat low dose 50 mg BID  Reduce OXC to 600 mg BID  VEEG   H2O restriction

## 2020-11-18 NOTE — PROCEDURE NOTE - NSPROCDETAILS_GEN_ALL_CORE
flushes easily/dressing applied/blood seen on insertion/location identified, draped/prepped, sterile technique used/secured in place/sterile technique, catheter placed/ultrasound utilization

## 2020-11-18 NOTE — CHART NOTE - NSCHARTNOTEFT_GEN_A_CORE
On 11/18, patient was placed up for transfer to [address] [place] [address] for epilepsy service given low sodium of 121 and new seizure with concern for hyponatremia induced seizures secondary to SIADH, possibly due to oxcarbazepine. The plan was to further adjust medications while monitoring repeat sodium levels and video EEG. However, patient adamantly refused further workup including further blood draws, EEG monitoring, and any additional time in the hospital. It was explained to patient that leaving early would put her at risk of repeat seizures, aspiration, brain injury, and death. She verbalized understanding of these risks through the  and insisted on leaving the hospital for follow up with Dr. Walter. She understands that we cannot further adjust and dose her oxcarbazepine without obtaining the drug level. She states she will follow up with Dr. Walter for further dosing. She verbalized understanding that she could return to the hospital to complete her evaluation and treatment at any time. Patient signed the against medical advice form and left the hospital. On 11/18, patient was placed up for transfer to [address] [place] [address] for epilepsy service given low sodium of 121 and new seizure with concern for hyponatremia induced seizures secondary to SIADH, possibly due to oxcarbazepine. The plan was to further adjust medications while monitoring repeat sodium levels and video EEG. However, patient adamantly refused further workup including further blood draws, EEG monitoring, and any additional time in the hospital. It was explained to patient that leaving early would put her at risk of repeat seizures, aspiration, brain injury, and death. She verbalized understanding of these risks through the  and insisted on leaving the hospital for follow up with Dr. Walter. She understands that we cannot further adjust and dose her oxcarbazepine without obtaining the drug level. She states she will follow up with Dr. Walter for further dosing. Reached out to cardiology team including Dr. Rose who came to see patient and further discuss the risks of leaving AMA. Patient agrees to stay on the condition that her room be changed (loud roommate), blood draws only done by phlebotomy, and that she has a Upper sorbian speaking nurse. She is still refusing EEG but we will continue to discuss this with patient.

## 2020-11-19 ENCOUNTER — APPOINTMENT (OUTPATIENT)
Dept: NEUROLOGY | Facility: CLINIC | Age: 35
End: 2020-11-19

## 2020-11-19 VITALS
OXYGEN SATURATION: 97 % | HEART RATE: 61 BPM | TEMPERATURE: 97 F | SYSTOLIC BLOOD PRESSURE: 138 MMHG | RESPIRATION RATE: 16 BRPM | DIASTOLIC BLOOD PRESSURE: 81 MMHG

## 2020-11-19 LAB
ALBUMIN SERPL ELPH-MCNC: 4.2 G/DL — SIGNIFICANT CHANGE UP (ref 3.3–5)
ALP SERPL-CCNC: 67 U/L — SIGNIFICANT CHANGE UP (ref 40–120)
ALT FLD-CCNC: 14 U/L — SIGNIFICANT CHANGE UP (ref 10–45)
ANION GAP SERPL CALC-SCNC: 10 MMOL/L — SIGNIFICANT CHANGE UP (ref 5–17)
ANION GAP SERPL CALC-SCNC: 11 MMOL/L — SIGNIFICANT CHANGE UP (ref 5–17)
AST SERPL-CCNC: 15 U/L — SIGNIFICANT CHANGE UP (ref 10–40)
BASOPHILS # BLD AUTO: 0.05 K/UL — SIGNIFICANT CHANGE UP (ref 0–0.2)
BASOPHILS NFR BLD AUTO: 0.8 % — SIGNIFICANT CHANGE UP (ref 0–2)
BILIRUB SERPL-MCNC: 0.2 MG/DL — SIGNIFICANT CHANGE UP (ref 0.2–1.2)
BUN SERPL-MCNC: 10 MG/DL — SIGNIFICANT CHANGE UP (ref 7–23)
BUN SERPL-MCNC: 9 MG/DL — SIGNIFICANT CHANGE UP (ref 7–23)
CALCIUM SERPL-MCNC: 9.2 MG/DL — SIGNIFICANT CHANGE UP (ref 8.4–10.5)
CALCIUM SERPL-MCNC: 9.7 MG/DL — SIGNIFICANT CHANGE UP (ref 8.4–10.5)
CHLORIDE SERPL-SCNC: 101 MMOL/L — SIGNIFICANT CHANGE UP (ref 96–108)
CHLORIDE SERPL-SCNC: 104 MMOL/L — SIGNIFICANT CHANGE UP (ref 96–108)
CO2 SERPL-SCNC: 20 MMOL/L — LOW (ref 22–31)
CO2 SERPL-SCNC: 25 MMOL/L — SIGNIFICANT CHANGE UP (ref 22–31)
CREAT ?TM UR-MCNC: 8 MG/DL — SIGNIFICANT CHANGE UP
CREAT SERPL-MCNC: 0.65 MG/DL — SIGNIFICANT CHANGE UP (ref 0.5–1.3)
CREAT SERPL-MCNC: 0.9 MG/DL — SIGNIFICANT CHANGE UP (ref 0.5–1.3)
EOSINOPHIL # BLD AUTO: 0.04 K/UL — SIGNIFICANT CHANGE UP (ref 0–0.5)
EOSINOPHIL NFR BLD AUTO: 0.7 % — SIGNIFICANT CHANGE UP (ref 0–6)
GLUCOSE SERPL-MCNC: 109 MG/DL — HIGH (ref 70–99)
GLUCOSE SERPL-MCNC: 117 MG/DL — HIGH (ref 70–99)
HCT VFR BLD CALC: 44.7 % — SIGNIFICANT CHANGE UP (ref 34.5–45)
HGB BLD-MCNC: 14.9 G/DL — SIGNIFICANT CHANGE UP (ref 11.5–15.5)
IMM GRANULOCYTES NFR BLD AUTO: 0.7 % — SIGNIFICANT CHANGE UP (ref 0–1.5)
LYMPHOCYTES # BLD AUTO: 1.53 K/UL — SIGNIFICANT CHANGE UP (ref 1–3.3)
LYMPHOCYTES # BLD AUTO: 26 % — SIGNIFICANT CHANGE UP (ref 13–44)
MAGNESIUM SERPL-MCNC: 2.2 MG/DL — SIGNIFICANT CHANGE UP (ref 1.6–2.6)
MCHC RBC-ENTMCNC: 28.7 PG — SIGNIFICANT CHANGE UP (ref 27–34)
MCHC RBC-ENTMCNC: 33.3 GM/DL — SIGNIFICANT CHANGE UP (ref 32–36)
MCV RBC AUTO: 86 FL — SIGNIFICANT CHANGE UP (ref 80–100)
MONOCYTES # BLD AUTO: 0.56 K/UL — SIGNIFICANT CHANGE UP (ref 0–0.9)
MONOCYTES NFR BLD AUTO: 9.5 % — SIGNIFICANT CHANGE UP (ref 2–14)
NEUTROPHILS # BLD AUTO: 3.67 K/UL — SIGNIFICANT CHANGE UP (ref 1.8–7.4)
NEUTROPHILS NFR BLD AUTO: 62.3 % — SIGNIFICANT CHANGE UP (ref 43–77)
NRBC # BLD: 0 /100 WBCS — SIGNIFICANT CHANGE UP (ref 0–0)
OSMOLALITY UR: 53 MOSM/KG — LOW (ref 300–900)
PHOSPHATE SERPL-MCNC: 3 MG/DL — SIGNIFICANT CHANGE UP (ref 2.5–4.5)
PLATELET # BLD AUTO: 243 K/UL — SIGNIFICANT CHANGE UP (ref 150–400)
POTASSIUM SERPL-MCNC: 4.3 MMOL/L — SIGNIFICANT CHANGE UP (ref 3.5–5.3)
POTASSIUM SERPL-MCNC: 4.6 MMOL/L — SIGNIFICANT CHANGE UP (ref 3.5–5.3)
POTASSIUM SERPL-SCNC: 4.3 MMOL/L — SIGNIFICANT CHANGE UP (ref 3.5–5.3)
POTASSIUM SERPL-SCNC: 4.6 MMOL/L — SIGNIFICANT CHANGE UP (ref 3.5–5.3)
PROT SERPL-MCNC: 7.1 G/DL — SIGNIFICANT CHANGE UP (ref 6–8.3)
RBC # BLD: 5.2 M/UL — SIGNIFICANT CHANGE UP (ref 3.8–5.2)
RBC # FLD: 12 % — SIGNIFICANT CHANGE UP (ref 10.3–14.5)
SODIUM SERPL-SCNC: 134 MMOL/L — LOW (ref 135–145)
SODIUM SERPL-SCNC: 137 MMOL/L — SIGNIFICANT CHANGE UP (ref 135–145)
SODIUM UR-SCNC: <20 MMOL/L — SIGNIFICANT CHANGE UP
WBC # BLD: 5.89 K/UL — SIGNIFICANT CHANGE UP (ref 3.8–10.5)
WBC # FLD AUTO: 5.89 K/UL — SIGNIFICANT CHANGE UP (ref 3.8–10.5)

## 2020-11-19 PROCEDURE — C1894: CPT

## 2020-11-19 PROCEDURE — 82248 BILIRUBIN DIRECT: CPT

## 2020-11-19 PROCEDURE — 83605 ASSAY OF LACTIC ACID: CPT

## 2020-11-19 PROCEDURE — 85610 PROTHROMBIN TIME: CPT

## 2020-11-19 PROCEDURE — 99232 SBSQ HOSP IP/OBS MODERATE 35: CPT

## 2020-11-19 PROCEDURE — 83880 ASSAY OF NATRIURETIC PEPTIDE: CPT

## 2020-11-19 PROCEDURE — 99285 EMERGENCY DEPT VISIT HI MDM: CPT | Mod: 25

## 2020-11-19 PROCEDURE — 83690 ASSAY OF LIPASE: CPT

## 2020-11-19 PROCEDURE — 86900 BLOOD TYPING SEROLOGIC ABO: CPT

## 2020-11-19 PROCEDURE — 93005 ELECTROCARDIOGRAM TRACING: CPT

## 2020-11-19 PROCEDURE — 95720 EEG PHY/QHP EA INCR W/VEEG: CPT

## 2020-11-19 PROCEDURE — 84133 ASSAY OF URINE POTASSIUM: CPT

## 2020-11-19 PROCEDURE — 95713 VEEG 2-12 HR CONT MNTR: CPT

## 2020-11-19 PROCEDURE — 82570 ASSAY OF URINE CREATININE: CPT

## 2020-11-19 PROCEDURE — 85027 COMPLETE CBC AUTOMATED: CPT

## 2020-11-19 PROCEDURE — 84146 ASSAY OF PROLACTIN: CPT

## 2020-11-19 PROCEDURE — 84540 ASSAY OF URINE/UREA-N: CPT

## 2020-11-19 PROCEDURE — 84300 ASSAY OF URINE SODIUM: CPT

## 2020-11-19 PROCEDURE — 83735 ASSAY OF MAGNESIUM: CPT

## 2020-11-19 PROCEDURE — 85730 THROMBOPLASTIN TIME PARTIAL: CPT

## 2020-11-19 PROCEDURE — 84156 ASSAY OF PROTEIN URINE: CPT

## 2020-11-19 PROCEDURE — 70450 CT HEAD/BRAIN W/O DYE: CPT

## 2020-11-19 PROCEDURE — 95700 EEG CONT REC W/VID EEG TECH: CPT

## 2020-11-19 PROCEDURE — C1769: CPT

## 2020-11-19 PROCEDURE — 83036 HEMOGLOBIN GLYCOSYLATED A1C: CPT

## 2020-11-19 PROCEDURE — 82550 ASSAY OF CK (CPK): CPT

## 2020-11-19 PROCEDURE — 99239 HOSP IP/OBS DSCHRG MGMT >30: CPT

## 2020-11-19 PROCEDURE — 80048 BASIC METABOLIC PNL TOTAL CA: CPT

## 2020-11-19 PROCEDURE — 84484 ASSAY OF TROPONIN QUANT: CPT

## 2020-11-19 PROCEDURE — U0003: CPT

## 2020-11-19 PROCEDURE — 84100 ASSAY OF PHOSPHORUS: CPT

## 2020-11-19 PROCEDURE — 84550 ASSAY OF BLOOD/URIC ACID: CPT

## 2020-11-19 PROCEDURE — 36415 COLL VENOUS BLD VENIPUNCTURE: CPT

## 2020-11-19 PROCEDURE — 86850 RBC ANTIBODY SCREEN: CPT

## 2020-11-19 PROCEDURE — 86901 BLOOD TYPING SEROLOGIC RH(D): CPT

## 2020-11-19 PROCEDURE — 85025 COMPLETE CBC W/AUTO DIFF WBC: CPT

## 2020-11-19 PROCEDURE — 95714 VEEG EA 12-26 HR UNMNTR: CPT

## 2020-11-19 PROCEDURE — C1887: CPT

## 2020-11-19 PROCEDURE — 83935 ASSAY OF URINE OSMOLALITY: CPT

## 2020-11-19 PROCEDURE — 80183 DRUG SCRN QUANT OXCARBAZEPIN: CPT

## 2020-11-19 PROCEDURE — 71045 X-RAY EXAM CHEST 1 VIEW: CPT

## 2020-11-19 PROCEDURE — 80156 ASSAY CARBAMAZEPINE TOTAL: CPT

## 2020-11-19 PROCEDURE — 80061 LIPID PANEL: CPT

## 2020-11-19 PROCEDURE — C1760: CPT

## 2020-11-19 PROCEDURE — 80053 COMPREHEN METABOLIC PANEL: CPT

## 2020-11-19 PROCEDURE — 82553 CREATINE MB FRACTION: CPT

## 2020-11-19 PROCEDURE — 84702 CHORIONIC GONADOTROPIN TEST: CPT

## 2020-11-19 PROCEDURE — 82962 GLUCOSE BLOOD TEST: CPT

## 2020-11-19 PROCEDURE — 93306 TTE W/DOPPLER COMPLETE: CPT

## 2020-11-19 RX ORDER — LACOSAMIDE 50 MG/1
1 TABLET ORAL
Qty: 0 | Refills: 0 | DISCHARGE
Start: 2020-11-19

## 2020-11-19 RX ORDER — OXCARBAZEPINE 300 MG/1
1 TABLET, FILM COATED ORAL
Qty: 0 | Refills: 0 | DISCHARGE

## 2020-11-19 RX ORDER — LACOSAMIDE 50 MG/1
1 TABLET ORAL
Qty: 60 | Refills: 0
Start: 2020-11-19

## 2020-11-19 RX ADMIN — Medication 50 MILLIGRAM(S): at 06:09

## 2020-11-19 RX ADMIN — OXCARBAZEPINE 600 MILLIGRAM(S): 300 TABLET, FILM COATED ORAL at 06:09

## 2020-11-19 RX ADMIN — ISOSORBIDE MONONITRATE 60 MILLIGRAM(S): 60 TABLET, EXTENDED RELEASE ORAL at 06:09

## 2020-11-19 RX ADMIN — Medication 1 MILLIGRAM(S): at 11:28

## 2020-11-19 RX ADMIN — LACOSAMIDE 50 MILLIGRAM(S): 50 TABLET ORAL at 06:09

## 2020-11-19 RX ADMIN — RANOLAZINE 1000 MILLIGRAM(S): 500 TABLET, FILM COATED, EXTENDED RELEASE ORAL at 06:10

## 2020-11-19 NOTE — PROGRESS NOTE ADULT - PROBLEM SELECTOR PROBLEM 1
Hyponatremia
Unstable angina
Hyponatremia

## 2020-11-19 NOTE — PROGRESS NOTE ADULT - ATTENDING COMMENTS
good response to tolvaptan--  dc fluid restriction for today   ok to dc with close outpt f/u ( if ok from neuro viewpoint for epilepsy mgt) -- as overall 48 hour correction ok (and hyponatremia primarily of acute onset) but advised needs close f/u of na levels as outpt
Lat entry for services rendered 11/17/20. See PA note written above, for details. I reviewed the PA documentation.  I have personally seen and examined this patient today. I reviewed vitals, labs, medications, cardiac studies and additional imaging.  I agree with the PA's findings and plans as written above with the following additions/amendments:  Patient reported taking Oxcarbamazepine 600 BID, presented home list with 600 BID  Per allscripts notes and d/w Neuro, Dr Walter advised patient to augment to 1200BID in October 2020  Seizure in setting of subtx AAD level  Patient dose changed to 1200 BID  Oxcarbamazepine level sent  HECTOR Mark.  Cardiology Attending .
See PA note written above, for details. I reviewed the PA documentation.  I have personally seen and examined this patient today. I reviewed vitals, labs, medications, cardiac studies and additional imaging.  I agree with the PA's findings and plans as written above with the following additions/amendments:  Neurosurgery consult given patient's report of being unable to take ASA after prior TIA  -->approved for ASA/Brilinta load  -->rec'd follow up with Dr. Long as outpt  Plan for NPO LHC with Dr. Barrientos today 11/16  HECTOR Mark.  Cardiology Attending
See PA note written above, for details. I reviewed the PA documentation.  I have personally seen and examined this patient today. I reviewed vitals, labs, medications, cardiac studies and additional imaging.  I agree with the PA's findings and plans as written above with the following additions/amendments:  In correct AED level sent, OXcarbamazepine level sent and results pending  Per Neurology Dr. Jamison evaluation hypoNa 2/2 trileptal   Neuro prefers to transition to alternative AED  -->rec to start Vimpat low dose 50 mg BID  -->rec to reduce OXC to 600 mg BID  -->rec VEEG, but patient refusing and previously ripped off prior EEG leads  H2O restriction  Nephrology consulted for assistance with med induced hypoNa  Transfer Neurology/epilepsy service   HECTOR Mark.  Cardiology Attending

## 2020-11-19 NOTE — PROGRESS NOTE ADULT - ASSESSMENT
35 year old female with no history of renal disease, HTN, HLD, CAD, CVA, epilepsy, severe moyamoya presents with chest pain s/p clean cardiac cath. Post-procedural course complicated by seizure in the setting of subtherapeutic oxcarbazepine and acute hyponatremia.      Assessment/Plan:   #Acute euvolemic hyponatremia, asymptomatic  -Most likely SIADH given high UOsm and Wale in the setting of oxcarbazepine dose increased   -Na improved to 134 (10mEq/48hr) s/p tolvaptan 15mg with appropriate response in UOsm / Wale   -Remains asymptomatic    Recommend   -Discontinue fluid restriction  -Repeat BMP + urine lytes  -Seizure medications management per Neurology     Thank you for the opportunity to participate in the care of your patient. The nephrology service remains available to assist with any questions or concerns. Please feel free to reach us by paging the on-call nephrology fellow for urgent issues or as below.     Yan Aguila M.D.   PGY-4, Nephrology Fellow   C: [phone]   P: [phone]     P: [phone]    35 year old female with no history of renal disease, HTN, HLD, CAD, CVA, epilepsy, severe moyamoya presents with chest pain s/p clean cardiac cath. Post-procedural course complicated by seizure in the setting of subtherapeutic oxcarbazepine and acute hyponatremia.      Assessment/Plan:   #Acute euvolemic hyponatremia, asymptomatic  -Most likely SIADH given high UOsm and Wale in the setting of oxcarbazepine dose increased   -Na improved to 134 (10mEq/48hr) s/p tolvaptan 15mg with appropriate response in UOsm / Wale   -Remains asymptomatic    Recommend   -Discontinue fluid restriction and encourage ad tay free water intake today   -Repeat BMP + urine lytes q6h if remains in-house   -Seizure medications management per Neurology     Nephrologically stable for discharge with close out-patient follow-up.     Thank you for the opportunity to participate in the care of your patient. The nephrology service remains available to assist with any questions or concerns. Please feel free to reach us by paging the on-call nephrology fellow for urgent issues or as below.     Yan Aguila M.D.   PGY-4, Nephrology Fellow   C: [phone]   P: [phone]     P: [phone]

## 2020-11-19 NOTE — PROGRESS NOTE ADULT - SUBJECTIVE AND OBJECTIVE BOX
CC: Patient is a 35y old  Female who presents with a chief complaint of Chest pain (19 Nov 2020 11:34)      OVERNIGHT EVENTS: NAEON    SUBJECTIVE / INTERVAL HPI: Patient seen and examined at bedside. AFVSS. Pt has no complaints at the moment. Reports sz, lethargy and b/l arm weakness secondary to multiple venipunctures and IV access. Pt states she is able to get out of bed and ambulate w/o vertigo. Denies: confusion, ataxia, dysuria, CP, SOB, palpitations, wheezing, cough, N/V/D, fever, and chills.     ROS: negative unless otherwise stated above.    VITAL SIGNS:  Vital Signs Last 24 Hrs  T(C): 36.3 (19 Nov 2020 10:29), Max: 36.7 (18 Nov 2020 21:16)  T(F): 97.3 (19 Nov 2020 10:29), Max: 98 (18 Nov 2020 21:16)  HR: 61 (19 Nov 2020 10:29) (61 - 70)  BP: 138/81 (19 Nov 2020 10:29) (121/74 - 139/84)  BP(mean): --  RR: 16 (19 Nov 2020 10:29) (16 - 16)  SpO2: 97% (19 Nov 2020 10:29) (96% - 100%)    PHYSICAL EXAM:    General: NAD, lying on the bed  HEENT: NC/AT; PERRL, anicteric sclera; MMM  Neck: supple  Cardiovascular: +S1/S2; RRR, no mormurs, no gallops  Respiratory: CTA B/L; no W/R/R  Gastrointestinal: soft, NT/ND; +BSx4  Extremities: WWP; no edema, clubbing or cyanosis  Vascular: 2+ radial, 2+ popiteal, DP/PT pulses B/L  Neurological: AAOx3; cranial nerves II-XII intact, 5/5 strength b/l arms, 5/5 strength b/l legs    MEDICATIONS:  MEDICATIONS  (STANDING):  atorvastatin 80 milliGRAM(s) Oral at bedtime  folic acid 1 milliGRAM(s) Oral daily  isosorbide   mononitrate ER Tablet (IMDUR) 30 milliGRAM(s) Oral <User Schedule>  isosorbide   mononitrate ER Tablet (IMDUR) 60 milliGRAM(s) Oral <User Schedule>  lacosamide 50 milliGRAM(s) Oral two times a day  LORazepam   Injectable 2 milliGRAM(s) IV Push once  metoprolol succinate ER 50 milliGRAM(s) Oral two times a day  OXcarbazepine 600 milliGRAM(s) Oral two times a day  ranolazine 1000 milliGRAM(s) Oral two times a day    MEDICATIONS  (PRN):  LORazepam   Injectable 2 milliGRAM(s) IntraMuscular once PRN Seizure  nitroglycerin     SubLingual 0.4 milliGRAM(s) SubLingual every 5 minutes PRN Chest Pain      ALLERGIES:  Allergies    No Known Allergies    Intolerances        LABS:                        14.9   5.89  )-----------( 243      ( 19 Nov 2020 05:48 )             44.7     11-19    134<L>  |  104  |  10  ----------------------------<  117<H>  4.6   |  20<L>  |  0.65    Ca    9.2      19 Nov 2020 05:48  Phos  3.0     11-19  Mg     2.2     11-19    TPro  7.1  /  Alb  4.2  /  TBili  0.2  /  DBili  x   /  AST  15  /  ALT  14  /  AlkPhos  67  11-19        CAPILLARY BLOOD GLUCOSE          RADIOLOGY & ADDITIONAL TESTS: Reviewed. CC: Patient is a 35y old  Female who presents with a chief complaint of Chest pain (19 Nov 2020 11:34)      OVERNIGHT EVENTS: NAEON    SUBJECTIVE / INTERVAL HPI: Patient seen and examined at bedside. AFVSS. Pt has no complaints at the moment. Reports sz, lethargy and b/l arm weakness secondary to multiple venipunctures and IV access. Pt states she is able to get out of bed and ambulate w/o vertigo. Denies: confusion, ataxia, dysuria, CP, SOB, palpitations, wheezing, cough, N/V/D, fever, and chills.     ROS: negative unless otherwise stated above.    VITAL SIGNS:  Vital Signs Last 24 Hrs  T(C): 36.3 (19 Nov 2020 10:29), Max: 36.7 (18 Nov 2020 21:16)  T(F): 97.3 (19 Nov 2020 10:29), Max: 98 (18 Nov 2020 21:16)  HR: 61 (19 Nov 2020 10:29) (61 - 70)  BP: 138/81 (19 Nov 2020 10:29) (121/74 - 139/84)  BP(mean): --  RR: 16 (19 Nov 2020 10:29) (16 - 16)  SpO2: 97% (19 Nov 2020 10:29) (96% - 100%)    PHYSICAL EXAM:    General: NAD, lying on the bed  Skin: small ecchymosis on right forearm near IV access  HEENT: NC/AT; PERRL, anicteric sclera; MMM  Neck: supple  Cardiovascular: +S1/S2; RRR, no murmurs, no gallops  Respiratory: CTA B/L; no W/R/R  Gastrointestinal: soft, NT/ND; +BSx4  Extremities: WWP; no edema, clubbing or cyanosis  Vascular: 2+ radial, 2+ popiteal, DP/PT pulses B/L  Neurological: AAOx3; cranial nerves II-XII intact, 5/5 strength b/l arms, 5/5 strength b/l legs    MEDICATIONS:  MEDICATIONS  (STANDING):  atorvastatin 80 milliGRAM(s) Oral at bedtime  folic acid 1 milliGRAM(s) Oral daily  isosorbide   mononitrate ER Tablet (IMDUR) 30 milliGRAM(s) Oral <User Schedule>  isosorbide   mononitrate ER Tablet (IMDUR) 60 milliGRAM(s) Oral <User Schedule>  lacosamide 50 milliGRAM(s) Oral two times a day  LORazepam   Injectable 2 milliGRAM(s) IV Push once  metoprolol succinate ER 50 milliGRAM(s) Oral two times a day  OXcarbazepine 600 milliGRAM(s) Oral two times a day  ranolazine 1000 milliGRAM(s) Oral two times a day    MEDICATIONS  (PRN):  LORazepam   Injectable 2 milliGRAM(s) IntraMuscular once PRN Seizure  nitroglycerin     SubLingual 0.4 milliGRAM(s) SubLingual every 5 minutes PRN Chest Pain      ALLERGIES:  Allergies    No Known Allergies    Intolerances        LABS:                        14.9   5.89  )-----------( 243      ( 19 Nov 2020 05:48 )             44.7     11-19    134<L>  |  104  |  10  ----------------------------<  117<H>  4.6   |  20<L>  |  0.65    Ca    9.2      19 Nov 2020 05:48  Phos  3.0     11-19  Mg     2.2     11-19    TPro  7.1  /  Alb  4.2  /  TBili  0.2  /  DBili  x   /  AST  15  /  ALT  14  /  AlkPhos  67  11-19        CAPILLARY BLOOD GLUCOSE          RADIOLOGY & ADDITIONAL TESTS: Reviewed.

## 2020-11-19 NOTE — PROGRESS NOTE ADULT - PROBLEM SELECTOR PROBLEM 7
DVT prophylaxis
Nutrition, metabolism, and development symptoms
Nutrition, metabolism, and development symptoms

## 2020-11-19 NOTE — PROGRESS NOTE ADULT - PROBLEM SELECTOR PLAN 6
Continue Oxcarbazepine 600mg PO bid
LDL 74, Trig 119, Hemoglobin A1c 5.3  - Continue with Atorvastatin 80mg PO daily
LDL 74, Trig 119, Hemoglobin A1c 5.3  - Continue with Atorvastatin 80mg PO daily
-H/o chronic seizures at home  -Pt with stress induced seizure post cath 11/16; stat CT head prelim read revealed no intercranial hemorrhage/transcortical infarct. Pt noted to be arousable afterwards; AO X 3.    -Epilepsy/Neuro team contacted, aware of CT head findings and 24 hr EEG, placed (results pending);  EEG removed at pt's request on 11/17/20 in PM. F/U results  -Increased from Oxcarbazepine 600mg BID to 900mg BID on 11/17/20  -Oxcarbazepine level done 11/17/20; results pending (sent out test per lab)  -Per neuro; pt was on Oxcarbazepine 1200mg BID at home.    -F/U Neuro results recs.    -Will likely discharge tomorrow if stable    - DVT PPX: SCD's   - Dispo: Pending clinical progression

## 2020-11-19 NOTE — PROGRESS NOTE ADULT - PROBLEM SELECTOR PLAN 7
F: s/p 3%hypertonic saline  E: replete as needed  N: DASH   - DVT PPX: SCD's   - Dispo: Pending clinical progression
SCD
F: s/p 3%hypertonic saline  E: replete as needed  N: DASH   - DVT PPX: SCD's   - Dispo: Pending clinical progression

## 2020-11-19 NOTE — PROGRESS NOTE ADULT - ASSESSMENT
36 y/o Korean speaking female with PMHx of HTN, HLD, CAD vs. coronary vasospasm s/p multiple PCI's (most recent 2016 @ [facility]), L hemispheric TIAs (intermittent speech difficulties), severe B/L moyamoya s/p left STA-MCA bypass in 2017 c/b acute intracranial hemorrhage (known to Dr. Long who has cleared pt to load for DAPT) who presented to the [facility] ED on 11/13 sent by Dr. Rose for evaluation of SSCP, relieved by SL Nitro and a recent abnormal CCTA. Pt was scheduled for cardiac cath as ambulatory for Thursday 11/19/20 however her pain worsened. Pt is now admitted to [address] [place] [address] for further management of unstable angina, s/p diagnostic cardiac cath with Dr. Barrientos on 11/16/20, currently awaiting Oxcarbenzepine, levels, found with hyponatremia today likely in the setting of Sz medications and is now transferred to neuro service for further management.

## 2020-11-19 NOTE — PROGRESS NOTE ADULT - PROBLEM SELECTOR PLAN 3
Pt presented to the ED by recommendation of Dr. Rose with reports of worsening SSCP 8/10, relieved by SL Nitro at home (reports she has been taking it very often for the past several weeks); currently chest pain free, VSS.   - R/O ACS with Trop negative x 3  - ECG NSR @ 76bpm, no STT changes  - Pt w/ h/o CAD, most recent cardiac cath 8/18/2016 @[facility] with Dr. JERI Barrientos, PTCA pCX; LM patent stent; pLAD patent stent; D1 patent stent, LCx Ostial 80% discrete lesion RCA angiographically normal.    - Pt. was on Cath schedule 11/19/20 with Dr. JERI Barrientos for UA and abnormal CCTA revealing possible restenosis of pLAD stent.   - Pt is s/p Diagnostic Cath revealing cath 11/16/2020: LM patent stent. LAD patent stent. D1 patent stent. LCx mild diffuse (small). RCA normal. EF 65%.. EDP 4mmHg. No AS/MR. R groin AS.  - ECHO 11/14/20 revealed normal left and RV size and systolic function, No significant valvular disease, Small anterior pericardial effusion without tamponade.  - Continue home Brilinta 60mg PO BID, Metoprolol succinate 50mg PO QD, Ranexa 1000mg PO BID, Imdur 30mg PO in PM, 60mg PO in AM, and Atorvastatin 80mg PO QD  - Cleared from cardiology standpoint to be discharged home

## 2020-11-19 NOTE — PROGRESS NOTE ADULT - SUBJECTIVE AND OBJECTIVE BOX
Patient is a 35 year old female seen and evaluated at bedside. No fatigue, dizziness, headache, nausea, or vomiting      Meds:  atorvastatin 80 at bedtime  folic acid 1 daily  isosorbide   mononitrate ER Tablet (IMDUR) 30 <User Schedule>  isosorbide   mononitrate ER Tablet (IMDUR) 60 <User Schedule>  lacosamide 50 two times a day  LORazepam   Injectable 2 once PRN  LORazepam   Injectable 2 once  metoprolol succinate ER 50 two times a day  nitroglycerin     SubLingual 0.4 every 5 minutes PRN  OXcarbazepine 600 two times a day  ranolazine 1000 two times a day    T(C): , Max: 36.7 (11-18-20 @ 21:16)  T(F): , Max: 98 (11-18-20 @ 21:16)  HR: 61 (11-19-20 @ 10:29)  BP: 138/81 (11-19-20 @ 10:29)  BP(mean): --  RR: 16 (11-19-20 @ 10:29)  SpO2: 97% (11-19-20 @ 10:29)  Wt(kg): --    11-18 @ 07:01  -  11-19 @ 07:00  --------------------------------------------------------  IN: 360 mL / OUT: 0 mL / NET: 360 mL    Review of Systems:  CONSTITUTIONAL: No fever or chills  RESPIRATORY: No shortness of breath, cough  CARDIOVASCULAR: No chest pain, shortness of breath  GASTROINTESTINAL: No abdominal pain, nausea, vomiting, diarrhea  GENITOURINARY: No dysuria  NEUROLOGICAL: No headache, weakness  MUSCULOSKELETAL: No swelling    PHYSICAL EXAM:  GENERAL: Alert, awake, breathing on room air  HEENT: No JVD  CHEST/LUNG: Bilateral clear breath sounds  HEART: Regular rate and rhythm  ABDOMEN: Soft, nontender, non distended  EXTREMITIES: No pedal edema  Neurology: AAOx3, no gross focal neurological deficit    LABS:                        14.9   5.89  )-----------( 243      ( 19 Nov 2020 05:48 )             44.7     11-19    134<L>  |  104  |  10  ----------------------------<  117<H>  4.6   |  20<L>  |  0.65    Ca    9.2      19 Nov 2020 05:48  Phos  3.0     11-19  Mg     2.2     11-19    TPro  7.1  /  Alb  4.2  /  TBili  0.2  /  DBili  x   /  AST  15  /  ALT  14  /  AlkPhos  67  11-19    Sodium, Random Urine: <20 mmol/L (11-19 @ 00:20)  Creatinine, Random Urine: 8 mg/dL (11-19 @ 00:20)  Osmolality, Random Urine: 53 mosm/kg (11-19 @ 00:20)  Creatinine, Random Urine: 152 mg/dL (11-18 @ 10:29)  Sodium, Random Urine: 112 mmol/L (11-18 @ 10:29)  Osmolality, Random Urine: 913 mosm/kg (11-18 @ 10:29)  Potassium, Random Urine: 45 mmol/L (11-18 @ 10:29) Patient is a 35 year old female seen and evaluated at bedside. No fatigue, dizziness, headache, nausea, or vomiting      Meds:  atorvastatin 80 at bedtime  folic acid 1 daily  isosorbide   mononitrate ER Tablet (IMDUR) 30 <User Schedule>  isosorbide   mononitrate ER Tablet (IMDUR) 60 <User Schedule>  lacosamide 50 two times a day  LORazepam   Injectable 2 once PRN  LORazepam   Injectable 2 once  metoprolol succinate ER 50 two times a day  nitroglycerin     SubLingual 0.4 every 5 minutes PRN  OXcarbazepine 600 two times a day  ranolazine 1000 two times a day    T(C): , Max: 36.7 (11-18-20 @ 21:16)  T(F): , Max: 98 (11-18-20 @ 21:16)  HR: 61 (11-19-20 @ 10:29)  BP: 138/81 (11-19-20 @ 10:29)  BP(mean): --  RR: 16 (11-19-20 @ 10:29)  SpO2: 97% (11-19-20 @ 10:29)  Wt(kg): --    11-18 @ 07:01  -  11-19 @ 07:00  --------------------------------------------------------  IN: 360 mL / OUT: 0 mL / NET: 360 mL    Review of Systems:  CONSTITUTIONAL: No fever or chills  RESPIRATORY: No shortness of breath, cough  CARDIOVASCULAR: No chest pain, shortness of breath  GASTROINTESTINAL: No abdominal pain, nausea, vomiting, diarrhea  NEUROLOGICAL: No headache, weakness    PHYSICAL EXAM:  GENERAL: Alert, awake, breathing on room air  CHEST/LUNG: Bilateral clear breath sounds  HEART: Regular rate and rhythm  ABDOMEN: Soft, nontender, non distended  EXTREMITIES: No pedal edema  Neurology: AAOx3, no gross focal neurological deficit    LABS:                        14.9   5.89  )-----------( 243      ( 19 Nov 2020 05:48 )             44.7     11-19    134<L>  |  104  |  10  ----------------------------<  117<H>  4.6   |  20<L>  |  0.65    Ca    9.2      19 Nov 2020 05:48  Phos  3.0     11-19  Mg     2.2     11-19    TPro  7.1  /  Alb  4.2  /  TBili  0.2  /  DBili  x   /  AST  15  /  ALT  14  /  AlkPhos  67  11-19    Sodium, Random Urine: <20 mmol/L (11-19 @ 00:20)  Creatinine, Random Urine: 8 mg/dL (11-19 @ 00:20)  Osmolality, Random Urine: 53 mosm/kg (11-19 @ 00:20)  Creatinine, Random Urine: 152 mg/dL (11-18 @ 10:29)  Sodium, Random Urine: 112 mmol/L (11-18 @ 10:29)  Osmolality, Random Urine: 913 mosm/kg (11-18 @ 10:29)  Potassium, Random Urine: 45 mmol/L (11-18 @ 10:29)

## 2020-11-19 NOTE — PROGRESS NOTE ADULT - PROBLEM SELECTOR PLAN 1
-Noted with  hyponatremia (Na+ 121 today). Admit NA+ 130's; likely in the setting of Seizure medications/ SIADH. urine osms 913, urine NA elevated   -Renal evaluated, recs to give NS 3% @ 30cc x 3hours- s/p NS 3%  -repeat BMP ordered for 10PM   -F/U Renal recs, start tolvaptan 15mg    - -Sodium levels are elevating. Na+ 134 (11/19) and urine Osm 53 (11/19).  Admit   + 130's; likely in the setting of Seizure medications/ SIADH.  -repeat BMP   -F/U Renal recs,

## 2020-11-19 NOTE — PROGRESS NOTE ADULT - REASON FOR ADMISSION
Unstable Angina.  Pt. also recently had abnormal CCTA
Unstable Angina.  Pt. also recently had abnormal CCTA
Chest pain
Unstable Angina.  Pt. also recently had abnormal CCTA

## 2020-11-20 LAB — OXCARBAZEPINE SERPL-MCNC: 19 UG/ML — SIGNIFICANT CHANGE UP (ref 10–35)

## 2020-11-23 ENCOUNTER — NON-APPOINTMENT (OUTPATIENT)
Age: 35
End: 2020-11-23

## 2020-11-23 ENCOUNTER — APPOINTMENT (OUTPATIENT)
Dept: HEART AND VASCULAR | Facility: CLINIC | Age: 35
End: 2020-11-23
Payer: MEDICAID

## 2020-11-23 VITALS
TEMPERATURE: 97.9 F | SYSTOLIC BLOOD PRESSURE: 136 MMHG | HEART RATE: 75 BPM | BODY MASS INDEX: 25.46 KG/M2 | HEIGHT: 68 IN | DIASTOLIC BLOOD PRESSURE: 90 MMHG | WEIGHT: 168 LBS

## 2020-11-23 DIAGNOSIS — I67.5 MOYAMOYA DISEASE: ICD-10-CM

## 2020-11-23 DIAGNOSIS — E87.1 HYPO-OSMOLALITY AND HYPONATREMIA: ICD-10-CM

## 2020-11-23 PROCEDURE — 36415 COLL VENOUS BLD VENIPUNCTURE: CPT

## 2020-11-23 PROCEDURE — 99215 OFFICE O/P EST HI 40 MIN: CPT

## 2020-11-23 RX ORDER — OXCARBAZEPINE 600 MG/1
600 TABLET, FILM COATED ORAL
Qty: 360 | Refills: 3 | Status: DISCONTINUED | COMMUNITY
Start: 2020-10-08 | End: 2020-11-23

## 2020-11-23 RX ORDER — OXCARBAZEPINE 600 MG/1
600 TABLET, FILM COATED ORAL
Qty: 270 | Refills: 1 | Status: DISCONTINUED | COMMUNITY
Start: 2018-04-12 | End: 2020-11-23

## 2020-11-23 RX ORDER — ISOSORBIDE MONONITRATE 30 MG/1
30 TABLET, EXTENDED RELEASE ORAL
Qty: 90 | Refills: 3 | Status: DISCONTINUED | COMMUNITY
Start: 2018-09-12 | End: 2020-11-23

## 2020-11-23 RX ORDER — PANTOPRAZOLE 40 MG/1
40 TABLET, DELAYED RELEASE ORAL DAILY
Qty: 1 | Refills: 3 | Status: DISCONTINUED | COMMUNITY
Start: 2020-11-23 | End: 2020-11-23

## 2020-11-23 RX ORDER — FOLIC ACID 1 MG/1
1 TABLET ORAL DAILY
Qty: 90 | Refills: 3 | Status: ACTIVE | COMMUNITY
Start: 2019-04-09 | End: 1900-01-01

## 2020-11-23 NOTE — DISCUSSION/SUMMARY
[FreeTextEntry1] : Instructed her to take the Vimpat which was already authorized by Dr. Walter so that she can keep her oxcarbamazepine at 600 BID. \par \par Sodium sent today to determine whether to start the fludrocortisone that she did not yet start. \par Since she is denying GI symptoms will skip pantoprazole. \par She will be going to [facility] in a week and I have ordered her meds for 90 days. \par SL NTG d/c'd as pain is not likely anginal and she does not find much relief with it. \par \par Forty minutes spent reviewing medications, coordinating care with her neurologist, clarifying her meds with her, explaining disease process and need for adherence.

## 2020-11-23 NOTE — PHYSICAL EXAM
[General Appearance - Well Developed] : well developed [Normal Appearance] : normal appearance [Well Groomed] : well groomed [General Appearance - Well Nourished] : well nourished [No Deformities] : no deformities [General Appearance - In No Acute Distress] : no acute distress [Normal Jugular Venous A Waves Present] : normal jugular venous A waves present [Normal Jugular Venous V Waves Present] : normal jugular venous V waves present [No Jugular Venous Reed A Waves] : no jugular venous reed A waves [Respiration, Rhythm And Depth] : normal respiratory rhythm and effort [Exaggerated Use Of Accessory Muscles For Inspiration] : no accessory muscle use [Auscultation Breath Sounds / Voice Sounds] : lungs were clear to auscultation bilaterally [Heart Rate And Rhythm] : heart rate and rhythm were normal [Heart Sounds] : normal S1 and S2 [Murmurs] : no murmurs present [Nail Clubbing] : no clubbing of the fingernails [Cyanosis, Localized] : no localized cyanosis [Petechial Hemorrhages (___cm)] : no petechial hemorrhages [Skin Color & Pigmentation] : normal skin color and pigmentation [] : no rash [No Venous Stasis] : no venous stasis [Skin Lesions] : no skin lesions [No Skin Ulcers] : no skin ulcer [No Xanthoma] : no  xanthoma was observed [Oriented To Time, Place, And Person] : oriented to person, place, and time [Affect] : the affect was normal [Mood] : the mood was normal [No Anxiety] : not feeling anxious

## 2020-11-23 NOTE — HISTORY OF PRESENT ILLNESS
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  \par \par Since going home, she has not taken fludrocortisone 0.1 mg daily and pantoprazole. She has not had any seizures since discharge. \par \par Her mom feels that her pain may be a little better than prior to her admission. She has not taken SL NTG since leaving the hospital. \par \par She reports that she is eating some food but her appetite is low, but she does eat somewhat. She reports that she did not eat and she \par \par She reports that her chest pain is the same as prior to admission. Her chest pain is not in the setting of exercise or exertion. \par  \par Cardiac cath history:\par - 2014 --> s/p PTCA/TOMASZ to LAD\par - 2015 --> ISR of pLAD s/p TOMASZ with resolute stent\par - 2016 --> ISR of pLAD s/p PTCA/TOMASZ, as well as PTCA/TOMASZ to D1\par - 2016 --> PTCA of Cx. On this angiogram patient noted to have patent LM, pLAD, and pD1\par -2020  --> patent stents\par \par 3/2019- Modified Jordan stress test- 12:30 minutes- atypical left should pain, unchanged with exercise.\par

## 2020-11-24 DIAGNOSIS — I67.5 MOYAMOYA DISEASE: ICD-10-CM

## 2020-11-24 DIAGNOSIS — R07.9 CHEST PAIN, UNSPECIFIED: ICD-10-CM

## 2020-11-24 DIAGNOSIS — Z95.1 PRESENCE OF AORTOCORONARY BYPASS GRAFT: ICD-10-CM

## 2020-11-24 DIAGNOSIS — I25.118 ATHEROSCLEROTIC HEART DISEASE OF NATIVE CORONARY ARTERY WITH OTHER FORMS OF ANGINA PECTORIS: ICD-10-CM

## 2020-11-24 DIAGNOSIS — E78.5 HYPERLIPIDEMIA, UNSPECIFIED: ICD-10-CM

## 2020-11-24 DIAGNOSIS — G40.909 EPILEPSY, UNSPECIFIED, NOT INTRACTABLE, WITHOUT STATUS EPILEPTICUS: ICD-10-CM

## 2020-11-24 DIAGNOSIS — I31.3 PERICARDIAL EFFUSION (NONINFLAMMATORY): ICD-10-CM

## 2020-11-24 DIAGNOSIS — E22.2 SYNDROME OF INAPPROPRIATE SECRETION OF ANTIDIURETIC HORMONE: ICD-10-CM

## 2020-11-24 DIAGNOSIS — I10 ESSENTIAL (PRIMARY) HYPERTENSION: ICD-10-CM

## 2020-11-24 DIAGNOSIS — G45.9 TRANSIENT CEREBRAL ISCHEMIC ATTACK, UNSPECIFIED: ICD-10-CM

## 2020-11-24 LAB
ANION GAP SERPL CALC-SCNC: 12 MMOL/L
BUN SERPL-MCNC: 11 MG/DL
CALCIUM SERPL-MCNC: 9.5 MG/DL
CHLORIDE SERPL-SCNC: 104 MMOL/L
CO2 SERPL-SCNC: 24 MMOL/L
CREAT SERPL-MCNC: 0.83 MG/DL
GLUCOSE SERPL-MCNC: 107 MG/DL
POTASSIUM SERPL-SCNC: 4.3 MMOL/L
SODIUM SERPL-SCNC: 139 MMOL/L

## 2020-11-25 ENCOUNTER — RESULT REVIEW (OUTPATIENT)
Age: 35
End: 2020-11-25

## 2020-11-25 ENCOUNTER — OUTPATIENT (OUTPATIENT)
Dept: OUTPATIENT SERVICES | Facility: HOSPITAL | Age: 35
LOS: 1 days | End: 2020-11-25
Payer: MEDICAID

## 2020-11-25 ENCOUNTER — APPOINTMENT (OUTPATIENT)
Dept: MRI IMAGING | Facility: HOSPITAL | Age: 35
End: 2020-11-25
Payer: MEDICAID

## 2020-11-25 DIAGNOSIS — T82.897A OTHER SPECIFIED COMPLICATION OF CARDIAC PROSTHETIC DEVICES, IMPLANTS AND GRAFTS, INITIAL ENCOUNTER: Chronic | ICD-10-CM

## 2020-11-25 PROCEDURE — 70544 MR ANGIOGRAPHY HEAD W/O DYE: CPT

## 2020-11-25 PROCEDURE — 70544 MR ANGIOGRAPHY HEAD W/O DYE: CPT | Mod: 26

## 2020-12-02 ENCOUNTER — APPOINTMENT (OUTPATIENT)
Dept: NEUROLOGY | Facility: CLINIC | Age: 35
End: 2020-12-02

## 2021-01-28 ENCOUNTER — NON-APPOINTMENT (OUTPATIENT)
Age: 36
End: 2021-01-28

## 2021-06-14 ENCOUNTER — NON-APPOINTMENT (OUTPATIENT)
Age: 36
End: 2021-06-14

## 2021-06-14 ENCOUNTER — APPOINTMENT (OUTPATIENT)
Dept: HEART AND VASCULAR | Facility: CLINIC | Age: 36
End: 2021-06-14
Payer: MEDICAID

## 2021-06-14 VITALS
TEMPERATURE: 97 F | SYSTOLIC BLOOD PRESSURE: 126 MMHG | WEIGHT: 171 LBS | HEIGHT: 68 IN | HEART RATE: 80 BPM | DIASTOLIC BLOOD PRESSURE: 90 MMHG | BODY MASS INDEX: 25.91 KG/M2

## 2021-06-14 DIAGNOSIS — K21.9 GASTRO-ESOPHAGEAL REFLUX DISEASE W/OUT ESOPHAGITIS: ICD-10-CM

## 2021-06-14 DIAGNOSIS — R06.00 DYSPNEA, UNSPECIFIED: ICD-10-CM

## 2021-06-14 PROCEDURE — 36415 COLL VENOUS BLD VENIPUNCTURE: CPT

## 2021-06-14 PROCEDURE — 93000 ELECTROCARDIOGRAM COMPLETE: CPT

## 2021-06-14 PROCEDURE — 99215 OFFICE O/P EST HI 40 MIN: CPT

## 2021-06-14 RX ORDER — METOPROLOL SUCCINATE 50 MG/1
50 TABLET, EXTENDED RELEASE ORAL DAILY
Qty: 90 | Refills: 3 | Status: DISCONTINUED | COMMUNITY
Start: 2019-09-25 | End: 2021-06-14

## 2021-06-14 NOTE — DISCUSSION/SUMMARY
[FreeTextEntry1] : History obtained with the use of  by phone. 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 \par \par 1. Chest pain \par - LHC in 2020 showed patent stents at dLA, pLAD and ostial D1 \par - her chest pain could be ischemic in origin given the exertional component, given non-obstructive precordial arteries, the chest pain might be from microvascular disease \par - will optimize her anti-anginal regimen \par - will increase Toprol XL to 50 mg bid \par - continue Isosorbide mononitrate ER 60 mg daily \par - continue Renexa 1000 mg bid \par - continue Brilinta 60 mg daily \par - continue to use SL nitroglycerine 0.4 mg PRN for chest pain \par \par 2. HTN \par - BP will well controlled \par - will continue current med \par \par 3. HLD \par - will repeat lipid, A1c and chemistry \par - continue Lipitor 80 mg daily \par \par 4. Seizure disorder \par - she will continue to follow up with Neurology \par \par - RCT in 3-4 mths \par \par

## 2021-06-14 NOTE — PHYSICAL EXAM
[Well Nourished] : well nourished [Well Developed] : well developed [No Acute Distress] : no acute distress [Normal Conjunctiva] : normal conjunctiva [Normal Venous Pressure] : normal venous pressure [No Carotid Bruit] : no carotid bruit [Normal S1, S2] : normal S1, S2 [No Murmur] : no murmur [Soft] : abdomen soft [Non Tender] : non-tender [Normal Gait] : normal gait [No Edema] : no edema [No Cyanosis] : no cyanosis [No Rash] : no rash [No Skin Lesions] : no skin lesions [Moves all extremities] : moves all extremities [No Focal Deficits] : no focal deficits [Alert and Oriented] : alert and oriented

## 2021-06-14 NOTE — END OF VISIT
[] : Fellow [FreeTextEntry3] : Patient seen and examined and case discussed with fellow. \par \par  [Time Spent: ___ minutes] : I have spent [unfilled] minutes of time on the encounter.

## 2021-06-14 NOTE — REVIEW OF SYSTEMS
[SOB] : shortness of breath [Chest Discomfort] : chest discomfort [Fever] : no fever [Headache] : no headache [Weight Gain (___ Lbs)] : no recent weight gain [Chills] : no chills [Feeling Fatigued] : not feeling fatigued [Weight Loss (___ Lbs)] : no recent weight loss [Blurry Vision] : no blurred vision [Seeing Double (Diplopia)] : no diplopia [Earache] : no earache [Discharge From Ears] : no discharge from the ears [Sore Throat] : no sore throat [Sinus Pressure] : no sinus pressure [Leg Claudication] : no intermittent leg claudication [Palpitations] : no palpitations [Syncope] : no syncope [Abdominal Pain] : no abdominal pain [Nausea] : no nausea [Vomiting] : no vomiting [Change In The Stool] : no change in stool [Change in Appetite] : no change in appetite [Dysphagia] : no dysphagia [Constipation] : no constipation [Blood in Stool] : no blood in stool [Dysuria] : no dysuria [Joint Pain] : no joint pain [Joint Swelling] : no joint swelling [Rash] : no rash [Myalgia] : no myalgia [Itching] : no itching [Skin Lesions] : no skin lesions [Telangiectasias] : no telangiectasias [Dizziness] : no dizziness [Tremor] : no tremor was seen [Convulsions] : no convulsions [Tingling (Paresthesia)] : no tingling [Confusion] : no confusion was observed [Memory Lapses Or Loss] : no memory lapses or loss [Under Stress] : not under stress [Suicidal] : not suicidal

## 2021-06-17 LAB
25(OH)D3 SERPL-MCNC: 41.7 NG/ML
ALBUMIN SERPL ELPH-MCNC: 4.6 G/DL
ALP BLD-CCNC: 68 U/L
ALT SERPL-CCNC: 19 U/L
ANION GAP SERPL CALC-SCNC: 16 MMOL/L
AST SERPL-CCNC: 16 U/L
BASOPHILS # BLD AUTO: 0.05 K/UL
BASOPHILS NFR BLD AUTO: 0.9 %
BILIRUB SERPL-MCNC: 0.3 MG/DL
BUN SERPL-MCNC: 8 MG/DL
CALCIUM SERPL-MCNC: 9.6 MG/DL
CHLORIDE SERPL-SCNC: 102 MMOL/L
CHOLEST SERPL-MCNC: 143 MG/DL
CO2 SERPL-SCNC: 22 MMOL/L
CREAT SERPL-MCNC: 0.79 MG/DL
EOSINOPHIL # BLD AUTO: 0.05 K/UL
EOSINOPHIL NFR BLD AUTO: 0.9 %
ESTIMATED AVERAGE GLUCOSE: 108 MG/DL
GLUCOSE SERPL-MCNC: 97 MG/DL
HBA1C MFR BLD HPLC: 5.4 %
HCT VFR BLD CALC: 45.5 %
HDLC SERPL-MCNC: 47 MG/DL
HGB BLD-MCNC: 14.5 G/DL
IMM GRANULOCYTES NFR BLD AUTO: 0.5 %
LDLC SERPL CALC-MCNC: 72 MG/DL
LYMPHOCYTES # BLD AUTO: 1.8 K/UL
LYMPHOCYTES NFR BLD AUTO: 30.6 %
MAN DIFF?: NORMAL
MCHC RBC-ENTMCNC: 29.4 PG
MCHC RBC-ENTMCNC: 31.9 GM/DL
MCV RBC AUTO: 92.3 FL
MONOCYTES # BLD AUTO: 0.35 K/UL
MONOCYTES NFR BLD AUTO: 6 %
NEUTROPHILS # BLD AUTO: 3.6 K/UL
NEUTROPHILS NFR BLD AUTO: 61.1 %
NONHDLC SERPL-MCNC: 96 MG/DL
PLATELET # BLD AUTO: 244 K/UL
POTASSIUM SERPL-SCNC: 4.6 MMOL/L
PROT SERPL-MCNC: 7.3 G/DL
RBC # BLD: 4.93 M/UL
RBC # FLD: 13.2 %
SODIUM SERPL-SCNC: 139 MMOL/L
TRIGL SERPL-MCNC: 120 MG/DL
TSH SERPL-ACNC: 0.77 UIU/ML
WBC # FLD AUTO: 5.88 K/UL

## 2021-06-22 ENCOUNTER — APPOINTMENT (OUTPATIENT)
Dept: NEUROLOGY | Facility: CLINIC | Age: 36
End: 2021-06-22
Payer: MEDICAID

## 2021-06-22 VITALS
HEIGHT: 68 IN | DIASTOLIC BLOOD PRESSURE: 90 MMHG | HEART RATE: 81 BPM | RESPIRATION RATE: 16 BRPM | SYSTOLIC BLOOD PRESSURE: 122 MMHG | BODY MASS INDEX: 25.76 KG/M2 | OXYGEN SATURATION: 98 % | WEIGHT: 170 LBS | TEMPERATURE: 97.8 F

## 2021-06-22 PROCEDURE — 99214 OFFICE O/P EST MOD 30 MIN: CPT

## 2021-06-30 ENCOUNTER — RX RENEWAL (OUTPATIENT)
Age: 36
End: 2021-06-30

## 2021-10-11 ENCOUNTER — NON-APPOINTMENT (OUTPATIENT)
Age: 36
End: 2021-10-11

## 2021-10-11 ENCOUNTER — APPOINTMENT (OUTPATIENT)
Dept: HEART AND VASCULAR | Facility: CLINIC | Age: 36
End: 2021-10-11
Payer: MEDICAID

## 2021-10-11 VITALS
BODY MASS INDEX: 26.07 KG/M2 | DIASTOLIC BLOOD PRESSURE: 100 MMHG | OXYGEN SATURATION: 95 % | HEIGHT: 68 IN | HEART RATE: 74 BPM | SYSTOLIC BLOOD PRESSURE: 122 MMHG | WEIGHT: 172 LBS | TEMPERATURE: 97.6 F

## 2021-10-11 VITALS — SYSTOLIC BLOOD PRESSURE: 138 MMHG | DIASTOLIC BLOOD PRESSURE: 98 MMHG

## 2021-10-11 DIAGNOSIS — E78.5 HYPERLIPIDEMIA, UNSPECIFIED: ICD-10-CM

## 2021-10-11 DIAGNOSIS — E78.41 ELEVATED LIPOPROTEIN(A): ICD-10-CM

## 2021-10-11 PROCEDURE — 93000 ELECTROCARDIOGRAM COMPLETE: CPT

## 2021-10-11 PROCEDURE — 99214 OFFICE O/P EST MOD 30 MIN: CPT

## 2021-10-11 RX ORDER — NITROGLYCERIN 0.4 MG/1
0.4 TABLET SUBLINGUAL
Qty: 90 | Refills: 3 | Status: ACTIVE | COMMUNITY
Start: 2020-11-24 | End: 1900-01-01

## 2021-10-11 RX ORDER — RANOLAZINE 1000 MG/1
1000 TABLET, FILM COATED, EXTENDED RELEASE ORAL
Qty: 180 | Refills: 3 | Status: ACTIVE | COMMUNITY
Start: 2018-09-12 | End: 1900-01-01

## 2021-10-11 RX ORDER — ATORVASTATIN CALCIUM 80 MG/1
80 TABLET, FILM COATED ORAL
Qty: 90 | Refills: 3 | Status: ACTIVE | COMMUNITY
Start: 2018-09-12 | End: 1900-01-01

## 2021-10-11 RX ORDER — ISOSORBIDE MONONITRATE 60 MG/1
60 TABLET, EXTENDED RELEASE ORAL DAILY
Qty: 60 | Refills: 11 | Status: DISCONTINUED | COMMUNITY
Start: 2018-09-12 | End: 2021-10-11

## 2021-10-11 RX ORDER — METOPROLOL SUCCINATE 50 MG/1
50 TABLET, EXTENDED RELEASE ORAL
Qty: 180 | Refills: 3 | Status: ACTIVE | COMMUNITY
Start: 2021-06-14 | End: 1900-01-01

## 2021-10-11 RX ORDER — ISOSORBIDE MONONITRATE 120 MG/1
120 TABLET, EXTENDED RELEASE ORAL DAILY
Qty: 90 | Refills: 3 | Status: ACTIVE | COMMUNITY
Start: 2021-10-11 | End: 1900-01-01

## 2021-10-11 RX ORDER — TICAGRELOR 60 MG/1
60 TABLET ORAL
Qty: 180 | Refills: 3 | Status: ACTIVE | COMMUNITY
Start: 2017-05-19 | End: 1900-01-01

## 2021-10-15 NOTE — HISTORY OF PRESENT ILLNESS
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  \par \par She has had chest pain when she walks and she ends up using a lot of nitroglycerine. Last month it was a little less. She also has shortness of breath when she gets the pain. She does not get lightheadedness. She occasionally gets palpitations when she gets the chest pain. She has not had fever, chills, coughing. She had traveled to  and will be travelling in November. She ran out of Virtuix and has not been on it in the past week and asks for refills. She had a seizure about 6 days ago, but it was mild and no others recently. No significant headaches. She still has a IUD for contraception. \par \par Prior History\par Multiple prior episodes of transient symptoms of word finding difficulties, slurred speech, unilateral weakness\par \par Cardiac cath history:\par - 2014 --> s/p PTCA/TOMASZ to LAD\par - 2015 --> ISR of pLAD s/p TOMASZ with resolute stent\par - 2016 --> ISR of pLAD s/p PTCA/TOMASZ, as well as PTCA/TOMASZ to D1\par - 2016 --> PTCA of Cx. On this angiogram patient noted to have patent LM, pLAD, and pD1\par \par 3/2019- Modified Jordan stress test- 12:30 minutes- atypical left should pain, unchanged with exercise.\par \par \par

## 2021-10-15 NOTE — DISCUSSION/SUMMARY
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  \par CAD/SCAD \par Aspirin resistant. Resumed Brillinta for SCAD and CAD/ CVA prevention. \par Likely component of vaospasm and ordered for increased Imdur. Will  consider CCB if not adequately controlled on Imdur. \par \par Lipids \par Well controlled. \par \par BP \par Elevated as she is off of meds recently in prior 2 days. She will resume and return for follow-u in 3 weeks.

## 2021-10-27 ENCOUNTER — APPOINTMENT (OUTPATIENT)
Dept: NEUROLOGY | Facility: CLINIC | Age: 36
End: 2021-10-27

## 2021-11-05 ENCOUNTER — APPOINTMENT (OUTPATIENT)
Dept: HEART AND VASCULAR | Facility: CLINIC | Age: 36
End: 2021-11-05
Payer: MEDICAID

## 2021-11-05 VITALS
SYSTOLIC BLOOD PRESSURE: 169 MMHG | OXYGEN SATURATION: 98 % | HEART RATE: 74 BPM | DIASTOLIC BLOOD PRESSURE: 104 MMHG | WEIGHT: 173 LBS | TEMPERATURE: 97.8 F | BODY MASS INDEX: 26.22 KG/M2 | HEIGHT: 68 IN

## 2021-11-05 DIAGNOSIS — I10 ESSENTIAL (PRIMARY) HYPERTENSION: ICD-10-CM

## 2021-11-05 DIAGNOSIS — I25.10 ATHEROSCLEROTIC HEART DISEASE OF NATIVE CORONARY ARTERY W/OUT ANGINA PECTORIS: ICD-10-CM

## 2021-11-05 PROCEDURE — 99214 OFFICE O/P EST MOD 30 MIN: CPT

## 2021-11-08 NOTE — HISTORY OF PRESENT ILLNESS
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 She presents today for follow up.  \par \par As per recent visit w/ Dr. Walter:  VEEG in 2020 was performed and OXC lowered due to dizziness and LCM added. PT feels tired with both meds, had 5 convulsive seizures in last 7 months. Will plan to admit for VEEG and allow for med adjustment, lowering OXC and raising LCM if ok with cardiology.\par \par Confirm - She still has a IUD for contraception. \par \par Cardiac cath history:\par - 2014 --> s/p PTCA/TOMASZ to LAD\par - 2015 --> ISR of pLAD s/p TOMASZ with resolute stent\par - 2016 --> ISR of pLAD s/p PTCA/TOMASZ, as well as PTCA/TOMASZ to D1\par - 2016 --> PTCA of Cx. On this angiogram patient noted to have patent LM, pLAD, and pD1\par \par 3/2019- Modified Jordan stress test- 12:30 minutes- atypical left should pain, unchanged with exercise.\par \par She was started on Imdur last visit and reports she is feeling worse in regard to the chest pain. She said she took 5 pills of the Nitro and then 7 pills another day for her pain. She said she knows she is not supposed to take that much, but she felt it was her only choice to help the pain. \par \par She is planning on going to  . \par

## 2021-11-08 NOTE — REASON FOR VISIT
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 She presents today for follow up.

## 2021-11-08 NOTE — DISCUSSION/SUMMARY
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 She presents today for follow up.  \par   \par CAD/SCAD, chest pain:\par Aspirin resistant. C/w Brillinta for SCAD and CAD/ CVA prevention. \par Likely component of vaospasm and she is not adequately managed with the addition of Imdur. Will  add Amlodipine 2.5mg daily today to her regimen and f/u in two weeks. \par \par Lipids - Well controlled. Labs checked in . c/w atorvastatin 80mg daily. \par \par BP - will f/u in two weeks to assess the efficacy of the addition of amlodipine. \par \par RTC in 2 weeks. She prefers telehealth if possible. Schedule for Monday 11/15 @ 1pm.\par

## 2021-11-15 ENCOUNTER — APPOINTMENT (OUTPATIENT)
Dept: HEART AND VASCULAR | Facility: CLINIC | Age: 36
End: 2021-11-15
Payer: MEDICAID

## 2021-11-15 DIAGNOSIS — Z98.61 ATHEROSCLEROTIC HEART DISEASE OF NATIVE CORONARY ARTERY W/OUT ANGINA PECTORIS: ICD-10-CM

## 2021-11-15 DIAGNOSIS — I25.10 ATHEROSCLEROTIC HEART DISEASE OF NATIVE CORONARY ARTERY W/OUT ANGINA PECTORIS: ICD-10-CM

## 2021-11-15 DIAGNOSIS — R07.9 CHEST PAIN, UNSPECIFIED: ICD-10-CM

## 2021-11-15 PROCEDURE — 99423 OL DIG E/M SVC 21+ MIN: CPT

## 2021-11-16 ENCOUNTER — APPOINTMENT (OUTPATIENT)
Dept: NEUROLOGY | Facility: CLINIC | Age: 36
End: 2021-11-16
Payer: MEDICAID

## 2021-11-16 VITALS
OXYGEN SATURATION: 98 % | HEART RATE: 83 BPM | HEIGHT: 68 IN | DIASTOLIC BLOOD PRESSURE: 102 MMHG | WEIGHT: 173 LBS | BODY MASS INDEX: 26.22 KG/M2 | TEMPERATURE: 97.9 F | SYSTOLIC BLOOD PRESSURE: 153 MMHG

## 2021-11-16 PROCEDURE — 99215 OFFICE O/P EST HI 40 MIN: CPT

## 2021-11-16 RX ORDER — OXCARBAZEPINE 600 MG/1
600 TABLET, FILM COATED ORAL TWICE DAILY
Qty: 180 | Refills: 3 | Status: ACTIVE | COMMUNITY
Start: 2020-11-23 | End: 1900-01-01

## 2021-11-16 RX ORDER — LACOSAMIDE 50 MG/1
50 TABLET, FILM COATED ORAL
Qty: 60 | Refills: 3 | Status: ACTIVE | COMMUNITY
Start: 2020-11-19 | End: 1900-01-01

## 2021-11-18 PROBLEM — I25.10 CAD S/P PERCUTANEOUS CORONARY ANGIOPLASTY: Status: ACTIVE | Noted: 2021-06-14

## 2021-11-18 NOTE — ASSESSMENT
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 She presents today for follow up. \par  \par CAD/SCAD, chest pain:\par Aspirin resistant. C/w Brillinta for SCAD and CAD/ CVA prevention. \par Likely component of vaospasm and now with good response to norvasc. She will increase the dose to BID and we will escalate as needed for her chest pain and for her blood pressure to be better controlled. \par \par Lipids - Well controlled. Labs checked in . c/w atorvastatin 80mg daily. \par \par BP - She will take norvasc 2.5 mg BID and will have BP checked tomorrow with Dr. Walter. will f/u in two weeks to assess the efficacy of the addition of amlodipine. \par \par [place]. She will call for an appointment when she returns. \par  \par

## 2021-11-18 NOTE — HISTORY OF PRESENT ILLNESS
[FreeTextEntry1] : This visit was conducted using a telephone platform in the setting of COVID-19 Pandemic.\par Patient initiated current encounter. Patient has provided verbal authorization to participate in this visit. \par Reason for telehealth visit: CAD/vasospasm\par I have spent 20minutes with the patient face-to-face discussing.\par Documentation- 5 minutes\par Physical exam was not done, however patient provided blood pressures and weight. 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  \par \par \par Her chest pain is actually improving on norvasc 2.5 mg daily which is a first for her. She is happy with the medicine and denies any other issues. \par \par She requests that we prescribe the same pill at night so that she can sleep better. \par She is seeing Dr. Walter tomorrow. \par \par Prior History: \par She has not had fever, chills, coughing. She had traveled to  and will be travelling in November. She ran out of Signadyne and has not been on it in the past week and asks for refills. She had a seizure about 6 days ago, but it was mild and no others recently. No significant headaches. She still has a IUD for contraception. \par \par Prior History\par Multiple prior episodes of transient symptoms of word finding difficulties, slurred speech, unilateral weakness\par \par Cardiac cath history:\par - 2014 --> s/p PTCA/TOMASZ to LAD\par - 2015 --> ISR of pLAD s/p TOMASZ with resolute stent\par - 2016 --> ISR of pLAD s/p PTCA/TOMASZ, as well as PTCA/TOMASZ to D1\par - 2016 --> PTCA of Cx. On this angiogram patient noted to have patent LM, pLAD, and pD1\par \par 3/2019- Modified Jordan stress test- 12:30 minutes- atypical left should pain, unchanged with exercise.\par \par \par

## 2021-11-18 NOTE — REASON FOR VISIT
[Symptom and Test Evaluation] : symptom and test evaluation [Hyperlipidemia] : hyperlipidemia [Hypertension] : hypertension [Coronary Artery Disease] : coronary artery disease [Source: ______] : History obtained from [unfilled]

## 2021-11-24 LAB
DM LACOSAMIDE: <0.5 UG/ML
LACOSAMIDE (VIMPAT): 2.6 UG/ML
OXCARBAZEPINE SERPL-MCNC: 14 UG/ML

## 2021-12-08 RX ORDER — AMLODIPINE BESYLATE 5 MG/1
5 TABLET ORAL DAILY
Qty: 90 | Refills: 3 | Status: ACTIVE | COMMUNITY
Start: 2021-11-05 | End: 1900-01-01

## 2021-12-30 NOTE — OCCUPATIONAL THERAPY INITIAL EVALUATION ADULT - LEVEL OF CONSCIOUSNESS, OT EVAL
alert O-T Advancement Flap Text: The defect edges were debeveled with a #15 scalpel blade.  Given the location of the defect, shape of the defect and the proximity to free margins an O-T advancement flap was deemed most appropriate.  Using a sterile surgical marker, an appropriate advancement flap was drawn incorporating the defect and placing the expected incisions within the relaxed skin tension lines where possible.    The area thus outlined was incised deep to adipose tissue with a #15 scalpel blade.  The skin margins were undermined to an appropriate distance in all directions utilizing iris scissors.

## 2022-01-12 ENCOUNTER — TRANSCRIPTION ENCOUNTER (OUTPATIENT)
Age: 37
End: 2022-01-12

## 2022-01-20 NOTE — ED ADULT TRIAGE NOTE - NS ED TRIAGE AVPU SCALE
50 years old Female with PMHx of Migraines, smoker who presents with chest pain since this morning.      #Chest pain r/o NSTEMI  #nonobstructive CAD  #AV fistula  -pressure like pain radiating to jaw and L arm  -positive trops, j point inflections on ekg; sinus bradford (BB held)  -loaded w/ plavix asa placed on hep gtt in ED; off plavix and heparin post cath  -s/p cath, showed non obstructive CAD  -ill defined structure possible AV fistula found on cath  -CCTA to further characterize   -echo pending  -c/ lipitor?    #migraines  -fioricet PRN  -non-formulary/pt own meds    #smoking  -socially; intermittent  -counselled       diet NPO for now  DVT ppx : heparin subq  GI ppx : not needed  AAT  code full
Alert-The patient is alert, awake and responds to voice. The patient is oriented to time, place, and person. The triage nurse is able to obtain subjective information.
50 Yr F PMH migraines presenting with chest pain    Chest pain NSTEMI  Dyslipidemia    - Trop stable, EKG unchanged from prior  - S/P Cardiac cath today has non-obstructive CAD. Possible fistula from coronary artery to RV  - Cont Aspirin. Cont Atorvastatin   - D/C heparin and plavix  - IV fluid post cath  - Monitor Cr Function  - Will need CCTA tomorrow and possible CT surgery eval  - Downgrade to cardiotelemetry today

## 2022-03-09 ENCOUNTER — NON-APPOINTMENT (OUTPATIENT)
Age: 37
End: 2022-03-09

## 2022-03-09 ENCOUNTER — APPOINTMENT (OUTPATIENT)
Dept: NEUROLOGY | Facility: CLINIC | Age: 37
End: 2022-03-09

## 2022-09-09 NOTE — H&P ADULT - PROBLEM SELECTOR PLAN 3
Spoke with Bernie who states she spoke with patient and scheduled surgery.    We are aware of it. plz consider it as a cancelled appt rather than missed appt. Thanks   multiple with no residual deficits.  On Atorvastatin 80mg PO daily.  Hx of severe bilateral moyamoya

## 2024-02-15 NOTE — BRIEF OPERATIVE NOTE - ELECTIVE PROCEDURE
Patient returned phone call, appt scheduled with  for 02/20/2024 @ 8:45am. Transferred to RN triage to provide other information from  message below.   Yes

## 2024-02-21 NOTE — PROGRESS NOTE ADULT - ASSESSMENT
36 y/o female with PMHx of HTN, HLD, CAD vs. coronary vasospasm s/p PTCA X3, L hemispheric TIAs (intermittent speech difficulties) with vascular imaging (MRA, DSA) that revealed bilateral ICA occlusion with multiple sources of collateral flow to bilateral hemispheres (mostly PCA on R side, PCA and transdural MMA collateral on L side, consistent with severe bilateral moyamoya and recent abnormal CCTA 2/2 to intermittent , non exertion chest pain was on schedule for cardiac catheterization next week presents to [facility] ER this evening, 11/13/20, by her Cardiologist, Dr. Rose, for Unstable Angina.     Yes 34 y/o female with PMHx of HTN, HLD, CAD vs. coronary vasospasm s/p PTCA X3, L hemispheric TIAs (intermittent speech difficulties) with vascular imaging (MRA, DSA) that revealed B/L ICA occlusion with multiple sources of collateral flow to B/L hemispheres (mostly PCA on R side, PCA and transdural MMA collateral on L side, c/w severe B/L moyamoya who presented to the [facility] ED on 11/13 sent by Dr. Rose for evaluation of SSCP, relieved by SL Nitro and a recent abnormal CCTA. Pt was scheduled for cardiac cath as ambulatory for Thursday 11/19/20 however her pain worsened. Pt is now admitted to [address] [place] [address] for further management of unstable angina and plan for cardiac cath with Dr. Barrientos on Monday 11/16/20.    36 y/o female with PMHx of HTN, HLD, CAD vs. coronary vasospasm s/p multiple PCI's (most recent 2016 @ [facility]), L hemispheric TIAs (intermittent speech difficulties), severe B/L moyamoya s/p left STA-MCA bypass in 2017 (known to Dr. Long) who presented to the [facility] ED on 11/13 sent by Dr. Rose for evaluation of SSCP, relieved by SL Nitro and a recent abnormal CCTA. Pt was scheduled for cardiac cath as ambulatory for Thursday 11/19/20 however her pain worsened. Pt is now admitted to [address] [place] [address] for further management of unstable angina and plan for cardiac cath with Dr. Barrientos on Monday 11/16/20.    34 y/o female with PMHx of HTN, HLD, CAD vs. coronary vasospasm s/p multiple PCI's (most recent 2016 @ [facility]), L hemispheric TIAs (intermittent speech difficulties), severe B/L moyamoya s/p left STA-MCA bypass in 2017 c/b acute intracranial hemorrhage (known to Dr. Long) who presented to the [facility] ED on 11/13 sent by Dr. Rose for evaluation of SSCP, relieved by SL Nitro and a recent abnormal CCTA. Pt was scheduled for cardiac cath as ambulatory for Thursday 11/19/20 however her pain worsened. Pt is now admitted to [address] [place] [address] for further management of unstable angina and plan for cardiac cath with Dr. Barrientos on Monday 11/16/20.    34 y/o Icelandic speaking female with PMHx of HTN, HLD, CAD vs. coronary vasospasm s/p multiple PCI's (most recent 2016 @ [facility]), L hemispheric TIAs (intermittent speech difficulties), severe B/L moyamoya s/p left STA-MCA bypass in 2017 c/b acute intracranial hemorrhage (known to Dr. Long) who presented to the [facility] ED on 11/13 sent by Dr. Rose for evaluation of SSCP, relieved by SL Nitro and a recent abnormal CCTA. Pt was scheduled for cardiac cath as ambulatory for Thursday 11/19/20 however her pain worsened. Pt is now admitted to [address] [place] [address] for further management of unstable angina and plan for cardiac cath with Dr. Barrientos on Monday 11/16/20.

## 2024-10-21 NOTE — CONSULT NOTE ADULT - CONSULT REASON
I reviewed and examined the patient. I was present for the key exam elements, and I fully participated in the patient's care. I discussed the management of the care with the resident. I have personally reviewed the pertinent labs and imaging, as well as recent notes, with the patient. I have reviewed the note above and agree with the resident's medical decision making as documented in the resident's note, in addition to the following comments / findings:     Agree with the rest of the plan outlined below by resident physician. No red flags.      The patient understands and agrees to the assessment and plan of care. Patient has also agreed to follow up and comply with the treatment and evaluation as recommended today. Patient was instructed to call the office at [phone] should questions arise regarding their treatment or care.     Vj Miller DO, FAOASM  Family Medicine   [address], [address]  [place] [address]     Vj Miller DO       Increase clotting post op

## 2025-06-14 NOTE — DISCHARGE NOTE PROVIDER - NSDCACTIVITY_GEN_ALL_CORE
No heavy lifting/straining EKG - see results section for interpretation/Xray Image(s) - see wet read section for interpretation